# Patient Record
Sex: MALE | Race: WHITE | Employment: OTHER | URBAN - METROPOLITAN AREA
[De-identification: names, ages, dates, MRNs, and addresses within clinical notes are randomized per-mention and may not be internally consistent; named-entity substitution may affect disease eponyms.]

---

## 2017-07-17 ENCOUNTER — OFFICE VISIT (OUTPATIENT)
Dept: UROLOGY | Age: 82
End: 2017-07-17

## 2017-07-17 VITALS
BODY MASS INDEX: 28.29 KG/M2 | OXYGEN SATURATION: 96 % | TEMPERATURE: 97.1 F | HEART RATE: 65 BPM | DIASTOLIC BLOOD PRESSURE: 69 MMHG | HEIGHT: 69 IN | WEIGHT: 191 LBS | SYSTOLIC BLOOD PRESSURE: 134 MMHG

## 2017-07-17 DIAGNOSIS — C61 PROSTATE CANCER (HCC): Primary | ICD-10-CM

## 2017-07-17 LAB
BILIRUB UR QL STRIP: NEGATIVE
GLUCOSE UR-MCNC: NEGATIVE MG/DL
KETONES P FAST UR STRIP-MCNC: NEGATIVE MG/DL
PH UR STRIP: 7 [PH] (ref 4.6–8)
PROT UR QL STRIP: NEGATIVE MG/DL
SP GR UR STRIP: 1.01 (ref 1–1.03)
UA UROBILINOGEN AMB POC: NORMAL (ref 0.2–1)
URINALYSIS CLARITY POC: CLEAR
URINALYSIS COLOR POC: YELLOW
URINE BLOOD POC: NORMAL
URINE LEUKOCYTES POC: NEGATIVE
URINE NITRITES POC: NEGATIVE

## 2017-07-17 RX ORDER — BUDESONIDE AND FORMOTEROL FUMARATE DIHYDRATE 80; 4.5 UG/1; UG/1
AEROSOL RESPIRATORY (INHALATION)
COMMUNITY
Start: 2017-05-30 | End: 2019-08-23 | Stop reason: SDUPTHER

## 2017-07-17 NOTE — MR AVS SNAPSHOT
Visit Information Date & Time Provider Department Dept. Phone Encounter #  
 7/17/2017  9:15 AM Tammie Hinton, [address] [facility] [phone] Upcoming Health Maintenance Date Due DTaP/Tdap/Td series (1 - Tdap) 10/8/1951 ZOSTER VACCINE AGE 60> 10/8/1990 GLAUCOMA SCREENING Q2Y 10/8/1995 Pneumococcal 65+ High/Highest Risk (1 of 2 - PCV13) 10/8/1995 MEDICARE YEARLY EXAM 10/8/1995 INFLUENZA AGE 9 TO ADULT 8/1/2017 Allergies as of 7/17/2017  Review Complete On: 7/17/2017 By: Matt Salas LPN No Known Allergies Current Immunizations  Never Reviewed No immunizations on file. Not reviewed this visit You Were Diagnosed With   
  
 Codes Comments Prostate cancer [facility])    -  Primary ICD-10-CM: C26 ICD-9-CM: 789 Vitals BP Pulse Temp Height(growth percentile) Weight(growth percentile) SpO2  
 134/69 (BP 1 Location: Left arm, BP Patient Position: Sitting) 65 97.1 °F (36.2 °C) 5' 9\" (1.753 m) 191 lb (86.6 kg) 96% BMI Smoking Status 28.21 kg/m2 Former Smoker Vitals History BMI and BSA Data Body Mass Index Body Surface Area  
 28.21 kg/m 2 2.05 m 2 Preferred Pharmacy Pharmacy Name Phone [facility], [address] [place] [address] Your Updated Medication List  
  
   
This list is accurate as of: 7/17/17  9:32 AM.  Always use your most recent med list.  
  
  
  
  
 ALEVE 220 mg Cap Generic drug:  naproxen sodium Take  by mouth. HYDROcodone-acetaminophen 5-325 mg per tablet Commonly known as:  Ruffus Homme Take 1 Tab by mouth every four (4) hours as needed for Pain. Max Daily Amount: 6 Tabs. lisinopril-hydroCHLOROthiazide 10-12.5 mg per tablet Commonly known as:  Mike Sinning Take 1 Tab by mouth daily. methocarbamol 500 mg tablet Commonly known as:  ROBAXIN Take 1 Tab by mouth four (4) times daily. SYMBICORT 80-4.5 mcg/actuation Hfaa inhaler Generic drug:  budesonide-formoterol ZyrTEC 10 mg Cap Generic drug:  Cetirizine Take  by mouth. We Performed the Following AMB POC URINALYSIS DIP STICK AUTO W/O MICRO [54099 CPT(R)] [place] COLLECTION VENOUS BLOOD,VENIPUNCTURE [id number] CPT(R)] PROSTATE SPECIFIC AG (PSA) [id number] CPT(R)] Patient Instructions Prostate Cancer: Care Instructions Your Care Instructions The prostate gland is a small, walnut-shaped organ that lies just below a man's bladder. It surrounds the urethra, the tube that carries urine from the bladder out of the body through the penis. Prostate cancer is the abnormal growth of cells in the prostate gland. Prostate cancer cells can spread within the prostate, to nearby lymph nodes and other tissues, and to other parts of the body. When the cancer hasn't spread outside the prostate, it is called localized prostate cancer. With localized prostate cancer, your options depend on how likely it is that your cancer will grow. Tests will show if your cancer is likely to grow. · Low-risk cancer isn't likely to grow right away. If your cancer is low-risk, you can choose active surveillance. This means your cancer will be watched closely by your doctor with regular checkups and tests to see if the cancer grows. This choice allows you to delay having surgery or radiation, often for many years. If the cancer grows very slowly, you may never need treatment. · Medium-risk cancer is more likely to grow. Some men with this type of cancer may be able to choose active surveillance. Others may need to choose surgery or radiation. · High-risk cancer is most likely to grow. If you have high-risk cancer, you will likely need to choose surgery or radiation. If your cancer has already spread outside the prostate or to other parts of the body, then you may have other treatments, like chemotherapy or hormone therapy. If you are over age [de-identified] or have other serious health problems, like heart disease, you may choose not to have treatments to cure your cancer. Instead, you can just have treatments to manage your symptoms. This is called watchful waiting. Finding out that you have cancer is scary. You may feel many emotions and may need some help coping. Seek out family, friends, and counselors for support. You also can do things at home to make yourself feel better while you go through treatment. Call the [employer] (3-477.244.7496) or visit its website at 6[address] [employer]. Gini [employer] Jony for more information. Follow-up care is a key part of your treatment and safety. Be sure to make and go to all appointments, and call your doctor if you are having problems. It's also a good idea to know your test results and keep a list of the medicines you take. How can you care for yourself at home? · Your doctor will talk to you about your treatment options. You may need to learn more about each of them before you can decide which treatment is best for you. · Take your medicines exactly as prescribed. Call your doctor if you think you are having a problem with your medicine. You will get more details on the specific medicines your doctor prescribes. · Eat healthy food. If you do not feel like eating, try to eat food that has protein and extra calories to keep up your strength and prevent weight loss. Drink liquid meal replacements for extra calories and protein. Try to eat your main meal early. · Take steps to control your stress and workload. Learn relaxation techniques. ¨ Share your feelings. Stress and tension affect our emotions. By expressing your feelings to others, you may be able to understand and cope with them. ¨ Consider joining a support group. Talking about a problem with your spouse, a good friend, or other people with similar problems is a good way to reduce tension and stress. ¨ Express yourself through art. Try writing, crafts, dance, or art to relieve stress. Some dance, writing, or art groups may be available just for people who have cancer. ¨ Be kind to your body and mind. Getting enough sleep, eating a healthy diet, and taking time to do things you enjoy can contribute to an overall feeling of balance in your life and can help reduce stress. ¨ Get help if you need it. Discuss your concerns with your doctor or counselor. · Get some physical activity every day, but do not get too tired. Keep doing the hobbies you enjoy as your energy allows. · If you are vomiting or have diarrhea: ¨ Drink plenty of fluids (enough so that your urine is light yellow or clear like water) to prevent dehydration. Choose water and other caffeine-free clear liquids. If you have kidney, heart, or liver disease and have to limit fluids, talk with your doctor before you increase the amount of fluids you drink. ¨ When you are able to eat, try clear soups, mild foods, and liquids until all symptoms are gone for 12 to 48 hours. Jell-O, dry toast, crackers, and cooked cereal are also good choices. · If you have not already done so, prepare a list of advance directives. Advance directives are instructions to your doctor and family members about what kind of care you want if you become unable to speak or express yourself. When should you call for help? Call your doctor now or seek immediate medical care if: 
· You cannot urinate. · You have symptoms of a urinary infection. For example: ¨ You have blood or pus in your urine. ¨ You have pain in your back just below your rib cage. This is called flank pain. ¨ You have a fever, chills, or body aches. ¨ It hurts to urinate. ¨ You have groin or belly pain. · You have pain in your back or hips. · Your pain is not controlled. · You are vomiting or nauseated. Watch closely for changes in your health, and be sure to contact your doctor if: · You have pain when you ejaculate. · You have trouble starting or controlling your urine. Where can you learn more? Go to http://danny-luciano.info/. Enter V141 in the search box to learn more about \"Prostate Cancer: Care Instructions. \" Current as of: July 26, 2016 Content Version: 11.3 © 9951-7578 [employer]. Care instructions adapted under license by [employer] (which disclaims liability or warranty for this information). If you have questions about a medical condition or this instruction, always ask your healthcare professional. [address] any warranty or liability for your use of this information. Introducing [facility]! [employer] introduces [employer] patient portal. Now you can access parts of your medical record, email your doctor's office, and request medication refills online. 1. In your internet browser, go to [email or web address] 2. Click on the First Time User? Click Here link in the Sign In box. You will see the New Member Sign Up page. 3. Enter your [employer] Access Code exactly as it appears below. You will not need to use this code after youve completed the sign-up process. If you do not sign up before the expiration date, you must request a new code. · [employer] Access Code: [email or web address] Expires: 10/15/2017  9:04 AM 
 
4. Enter the last four digits of your Social Security Number (xxxx) and Date of Birth (mm/dd/yyyy) as indicated and click Submit. You will be taken to the next sign-up page. 5. Create a Visual Pro 360t ID. This will be your [employer] login ID and cannot be changed, so think of one that is secure and easy to remember. 6. Create a [employer] password. You can change your password at any time. 7. Enter your Password Reset Question and Answer. This can be used at a later time if you forget your password. 8. Enter your e-mail address.  You will receive e-mail notification when new information is available in [employer]. 9. Click Sign Up. You can now view and download portions of your medical record. 10. Click the Download Summary menu link to download a portable copy of your medical information. If you have questions, please visit the Frequently Asked Questions section of the [employer] website. Remember, [employer] is NOT to be used for urgent needs. For medical emergencies, dial 911. Now available from your iPhone and Android! Please provide this summary of care documentation to your next provider. Your primary care clinician is listed as ADRIANA SUE. If you have any questions after today's visit, please call [phone].

## 2017-07-17 NOTE — PROGRESS NOTES
Mr. Aragon [facility] has a reminder for a \"due or due soon\" health maintenance. I have asked that he contact his primary care provider for follow-up on this health maintenance. RBV per Dr. Norm Atwood blood drawn in office today for PSA for Prostate Ca.

## 2017-07-17 NOTE — PROGRESS NOTES
Elsy Henry 80 y.o. male     Mr. Yosef Fraser seen today for follow-up prostate carcinoma status post radical retropubic prostatectomy in 1997 by Dr. Veronica Lopez  Patient is voiding well with mild stress urinary incontinence managed by wearing adult diapers patient has no irritative or obstructive voiding symptoms-no complaints regarding urination at this time    PSA less than 0.1 in July 2015  PSA less than 0.1 in July 2016      Review of Systems:    CNS: No seizures syncope headaches or dizziness no visual changes  Respiratory: No shortness of breast or wheezing  Cardiovascular:No chest pain or palpitations  Intestinal:No constipation or dyspepsia  Urinary: No irritative or obstructive voiding symptoms  Skeletal: No bone or joint pain  Endocrine:No diabetes or thyroid disease  Other:    Allergies: No Known Allergies   Medications:    Current Outpatient Prescriptions   Medication Sig Dispense Refill    SYMBICORT 80-4.5 mcg/actuation HFAA inhaler       Cetirizine (ZYRTEC) 10 mg cap Take  by mouth.  lisinopril-hydrochlorothiazide (PRINZIDE, ZESTORETIC) 10-12.5 mg per tablet Take 1 Tab by mouth daily.  naproxen sodium (ALEVE) 220 mg cap Take  by mouth.  HYDROcodone-acetaminophen (NORCO) 5-325 mg per tablet Take 1 Tab by mouth every four (4) hours as needed for Pain. Max Daily Amount: 6 Tabs. 20 Tab 0    methocarbamol (ROBAXIN) 500 mg tablet Take 1 Tab by mouth four (4) times daily. 21 Tab 0       Past Medical History:   Diagnosis Date    Hypertension       Past Surgical History:   Procedure Laterality Date    HX CATARACT REMOVAL Left 09/2004    HX CATARACT REMOVAL Right 11/2004    HX HERNIA REPAIR  11/2001    HX PROSTATECTOMY      HX RETINAL DETACHMENT REPAIR  10/2003     No family history on file.      Physical Examination: Well-nourished mature male in no apparent distress    digital rectal examination-negative for induration masses or tenderness      Urinalysis: Negative dipstick/nitrite negative    Impression: Prostate carcinoma-LUIS ALFREDO 20 years status post radical retropubic prostatectomy    Plan: PSA today    rtc 1 yr PSA SEVERO      More than 1/2 of this 15 minute visit was spent in counselling and coordination of care, as described above. Helga Gardiner MD  -electronically signed-    PLEASE NOTE:  This document has been produced using voice recognition software. Unrecognized errors in transcription may be present.

## 2017-07-17 NOTE — PATIENT INSTRUCTIONS
Prostate Cancer: Care Instructions  Your Care Instructions    The prostate gland is a small, walnut-shaped organ that lies just below a man's bladder. It surrounds the urethra, the tube that carries urine from the bladder out of the body through the penis. Prostate cancer is the abnormal growth of cells in the prostate gland. Prostate cancer cells can spread within the prostate, to nearby lymph nodes and other tissues, and to other parts of the body. When the cancer hasn't spread outside the prostate, it is called localized prostate cancer. With localized prostate cancer, your options depend on how likely it is that your cancer will grow. Tests will show if your cancer is likely to grow. · Low-risk cancer isn't likely to grow right away. If your cancer is low-risk, you can choose active surveillance. This means your cancer will be watched closely by your doctor with regular checkups and tests to see if the cancer grows. This choice allows you to delay having surgery or radiation, often for many years. If the cancer grows very slowly, you may never need treatment. · Medium-risk cancer is more likely to grow. Some men with this type of cancer may be able to choose active surveillance. Others may need to choose surgery or radiation. · High-risk cancer is most likely to grow. If you have high-risk cancer, you will likely need to choose surgery or radiation. If your cancer has already spread outside the prostate or to other parts of the body, then you may have other treatments, like chemotherapy or hormone therapy. If you are over age [de-identified] or have other serious health problems, like heart disease, you may choose not to have treatments to cure your cancer. Instead, you can just have treatments to manage your symptoms. This is called watchful waiting. Finding out that you have cancer is scary. You may feel many emotions and may need some help coping. Seek out family, friends, and counselors for support.  You also can do things at home to make yourself feel better while you go through treatment. Call the [employer] (3-469.109.9421) or visit its website at 6[address] [employer]. iConText for more information. Follow-up care is a key part of your treatment and safety. Be sure to make and go to all appointments, and call your doctor if you are having problems. It's also a good idea to know your test results and keep a list of the medicines you take. How can you care for yourself at home? · Your doctor will talk to you about your treatment options. You may need to learn more about each of them before you can decide which treatment is best for you. · Take your medicines exactly as prescribed. Call your doctor if you think you are having a problem with your medicine. You will get more details on the specific medicines your doctor prescribes. · Eat healthy food. If you do not feel like eating, try to eat food that has protein and extra calories to keep up your strength and prevent weight loss. Drink liquid meal replacements for extra calories and protein. Try to eat your main meal early. · Take steps to control your stress and workload. Learn relaxation techniques. ¨ Share your feelings. Stress and tension affect our emotions. By expressing your feelings to others, you may be able to understand and cope with them. ¨ Consider joining a support group. Talking about a problem with your spouse, a good friend, or other people with similar problems is a good way to reduce tension and stress. ¨ Express yourself through art. Try writing, crafts, dance, or art to relieve stress. Some dance, writing, or art groups may be available just for people who have cancer. ¨ Be kind to your body and mind. Getting enough sleep, eating a healthy diet, and taking time to do things you enjoy can contribute to an overall feeling of balance in your life and can help reduce stress. ¨ Get help if you need it.  Discuss your concerns with your doctor or counselor. · Get some physical activity every day, but do not get too tired. Keep doing the hobbies you enjoy as your energy allows. · If you are vomiting or have diarrhea:  ¨ Drink plenty of fluids (enough so that your urine is light yellow or clear like water) to prevent dehydration. Choose water and other caffeine-free clear liquids. If you have kidney, heart, or liver disease and have to limit fluids, talk with your doctor before you increase the amount of fluids you drink. ¨ When you are able to eat, try clear soups, mild foods, and liquids until all symptoms are gone for 12 to 48 hours. Jell-O, dry toast, crackers, and cooked cereal are also good choices. · If you have not already done so, prepare a list of advance directives. Advance directives are instructions to your doctor and family members about what kind of care you want if you become unable to speak or express yourself. When should you call for help? Call your doctor now or seek immediate medical care if:  · You cannot urinate. · You have symptoms of a urinary infection. For example:  ¨ You have blood or pus in your urine. ¨ You have pain in your back just below your rib cage. This is called flank pain. ¨ You have a fever, chills, or body aches. ¨ It hurts to urinate. ¨ You have groin or belly pain. · You have pain in your back or hips. · Your pain is not controlled. · You are vomiting or nauseated. Watch closely for changes in your health, and be sure to contact your doctor if:  · You have pain when you ejaculate. · You have trouble starting or controlling your urine. Where can you learn more? Go to http://danny-luciano.info/. Enter V141 in the search box to learn more about \"Prostate Cancer: Care Instructions. \"  Current as of: July 26, 2016  Content Version: 11.3  © 9663-6717 [employer].  Care instructions adapted under license by [employer] (which disclaims liability or warranty for this information). If you have questions about a medical condition or this instruction, always ask your healthcare professional. [place] [address] any warranty or liability for your use of this information.

## 2017-07-18 LAB — PSA SERPL-MCNC: <0.1 NG/ML (ref 0–4)

## 2017-12-22 ENCOUNTER — OFFICE VISIT (OUTPATIENT)
Dept: ORTHOPEDIC SURGERY | Age: 82
End: 2017-12-22

## 2017-12-22 VITALS
SYSTOLIC BLOOD PRESSURE: 150 MMHG | DIASTOLIC BLOOD PRESSURE: 71 MMHG | HEIGHT: 69 IN | WEIGHT: 190.8 LBS | OXYGEN SATURATION: 98 % | BODY MASS INDEX: 28.26 KG/M2 | RESPIRATION RATE: 16 BRPM | HEART RATE: 70 BPM

## 2017-12-22 DIAGNOSIS — M54.41 LOW BACK PAIN WITH RIGHT-SIDED SCIATICA, UNSPECIFIED BACK PAIN LATERALITY, UNSPECIFIED CHRONICITY: Primary | ICD-10-CM

## 2017-12-22 DIAGNOSIS — M25.551 RIGHT HIP PAIN: ICD-10-CM

## 2017-12-22 DIAGNOSIS — M70.61 TROCHANTERIC BURSITIS OF RIGHT HIP: ICD-10-CM

## 2017-12-22 RX ORDER — BETAMETHASONE SODIUM PHOSPHATE AND BETAMETHASONE ACETATE 3; 3 MG/ML; MG/ML
6 INJECTION, SUSPENSION INTRA-ARTICULAR; INTRALESIONAL; INTRAMUSCULAR; SOFT TISSUE ONCE
Qty: 1 ML | Refills: 0
Start: 2017-12-22 | End: 2017-12-22

## 2017-12-22 RX ORDER — ALBUTEROL SULFATE 90 UG/1
2 AEROSOL, METERED RESPIRATORY (INHALATION) DAILY
COMMUNITY
End: 2022-08-08

## 2017-12-22 NOTE — PROGRESS NOTES
[address] [place] [address]  [phone]           Patient: Cecelia Thomas                MRN: [id number]       SSN: [id number]  YOB: 1930        AGE: 80 y.o. SEX: male  Body mass index is 28.18 kg/(m^2). PCP: Jenny Milligan MD  12/22/17      This office note has been dictated. REVIEW OF SYSTEMS:  Constitutional: Negative for fever, chills, weight loss and malaise/fatigue. HENT: Negative. Eyes: Negative. Respiratory: Negative. Cardiovascular: Negative. Gastrointestinal: No bowel incontinence or constipation. Genitourinary: No bladder incontinence or saddle anesthesia. Skin: Negative. Neurological: Negative. Endo/Heme/Allergies: Negative. Psychiatric/Behavioral: Negative. Musculoskeletal: As per HPI above. Past Medical History:   Diagnosis Date    Hypertension          Current Outpatient Prescriptions:     albuterol (PROAIR HFA) 90 mcg/actuation inhaler, Take 2 Puffs by inhalation daily. , Disp: , Rfl:     Cetirizine (ZYRTEC) 10 mg cap, Take  by mouth., Disp: , Rfl:     naproxen sodium (ALEVE) 220 mg cap, Take  by mouth., Disp: , Rfl:     lisinopril-hydrochlorothiazide (PRINZIDE, ZESTORETIC) 10-12.5 mg per tablet, Take 1 Tab by mouth daily. , Disp: , Rfl:     SYMBICORT 80-4.5 mcg/actuation HFAA inhaler, , Disp: , Rfl:     HYDROcodone-acetaminophen (NORCO) 5-325 mg per tablet, Take 1 Tab by mouth every four (4) hours as needed for Pain. Max Daily Amount: 6 Tabs. (Patient not taking: Reported on 12/22/2017), Disp: 20 Tab, Rfl: 0    methocarbamol (ROBAXIN) 500 mg tablet, Take 1 Tab by mouth four (4) times daily. (Patient not taking: Reported on 12/22/2017), Disp: 20 Tab, Rfl: 0    No Known Allergies    Social History     Social History    Marital status:      Spouse name: N/A    Number of children: N/A    Years of education: N/A     Occupational History    Not on file. bursae on the right side. There is no pain with rotation of either hip. He does have a little bit of stiffness however. There is negative straight leg raise. There is negative calf tenderness. There is negative Zenias. There is no evidence of DVT present. 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 There is evidence for osteopenia. AP and lateral of the lumbar spine shows multilevel degenerative changes with evidence for a previous L3 compression fracture. ASSESSMENT:      1. Lumbar stenosis. 2. Trochanteric bursitis right hip. PLAN:  At this point, we are going to move forward with an MRI of the lumbar spine for further evaluation. I think that most of his problem is coming from his back. However, we are going to move forward with a cortisone injection for the right hip today. Under aseptic conditions, after informed and written consent, and appropriate time out performed, with ultrasound-guided assistance, the right hip was prepped with Betadine and 6 mg of Celestone was injected without complications. The patient tolerated the injection well. The patient was instructed on post injection care. We will see him back in the office after the MRI is performed for further evaluation.                     JR Shaun GONZALEZ, TY, ATC

## 2018-01-08 ENCOUNTER — HOSPITAL ENCOUNTER (OUTPATIENT)
Age: 83
Discharge: HOME | End: 2018-01-08
Attending: PHYSICIAN ASSISTANT
Payer: MEDICARE

## 2018-01-08 DIAGNOSIS — M54.41 LOW BACK PAIN WITH RIGHT-SIDED SCIATICA, UNSPECIFIED BACK PAIN LATERALITY, UNSPECIFIED CHRONICITY: ICD-10-CM

## 2018-01-08 PROCEDURE — 72148 MRI LUMBAR SPINE W/O DYE: CPT

## 2018-01-11 ENCOUNTER — OFFICE VISIT (OUTPATIENT)
Dept: ORTHOPEDIC SURGERY | Age: 83
End: 2018-01-11

## 2018-01-11 VITALS
SYSTOLIC BLOOD PRESSURE: 132 MMHG | HEART RATE: 68 BPM | OXYGEN SATURATION: 95 % | WEIGHT: 190 LBS | HEIGHT: 69 IN | BODY MASS INDEX: 28.14 KG/M2 | DIASTOLIC BLOOD PRESSURE: 76 MMHG | TEMPERATURE: 97.8 F

## 2018-01-11 DIAGNOSIS — M51.36 DEGENERATION OF INTERVERTEBRAL DISC OF LUMBAR REGION: Primary | ICD-10-CM

## 2018-01-11 NOTE — PROGRESS NOTES
[address] [place] [address]  [phone]           Patient: Krishan Lawson                MRN: [id number]       SSN: [id number]  YOB: 1930        AGE: 80 y.o. SEX: male  Body mass index is 28.06 kg/(m^2). PCP: Escobar Wang MD  01/11/18      This office note has been dictated. REVIEW OF SYSTEMS:  Constitutional: Negative for fever, chills, weight loss and malaise/fatigue. HENT: Negative. Eyes: Negative. Respiratory: Negative. Cardiovascular: Negative. Gastrointestinal: No bowel incontinence or constipation. Genitourinary: No bladder incontinence or saddle anesthesia. Skin: Negative. Neurological: Negative. Endo/Heme/Allergies: Negative. Psychiatric/Behavioral: Negative. Musculoskeletal: As per HPI above. Past Medical History:   Diagnosis Date    Hypertension          Current Outpatient Prescriptions:     albuterol (PROAIR HFA) 90 mcg/actuation inhaler, Take 2 Puffs by inhalation daily. , Disp: , Rfl:     SYMBICORT 80-4.5 mcg/actuation HFAA inhaler, , Disp: , Rfl:     Cetirizine (ZYRTEC) 10 mg cap, Take  by mouth., Disp: , Rfl:     naproxen sodium (ALEVE) 220 mg cap, Take  by mouth., Disp: , Rfl:     lisinopril-hydrochlorothiazide (PRINZIDE, ZESTORETIC) 10-12.5 mg per tablet, Take 1 Tab by mouth daily. , Disp: , Rfl:     HYDROcodone-acetaminophen (NORCO) 5-325 mg per tablet, Take 1 Tab by mouth every four (4) hours as needed for Pain. Max Daily Amount: 6 Tabs., Disp: 20 Tab, Rfl: 0    methocarbamol (ROBAXIN) 500 mg tablet, Take 1 Tab by mouth four (4) times daily. , Disp: 20 Tab, Rfl: 0    No Known Allergies    Social History     Social History    Marital status:      Spouse name: N/A    Number of children: N/A    Years of education: N/A     Occupational History    Not on file.      Social History Main Topics    Smoking status: Former Smoker     Quit date: 1/1/1978    Smokeless tobacco: Never Used    Alcohol use 1.0 oz/week     2 Standard drinks or equivalent per week    Drug use: No    Sexual activity: Not on file     Other Topics Concern    Not on file     Social History Narrative       Past Surgical History:   Procedure Laterality Date    HX CATARACT REMOVAL Left 09/2004    HX CATARACT REMOVAL Right 11/2004    HX HERNIA REPAIR  11/2001    HX PROSTATECTOMY      HX RETINAL DETACHMENT REPAIR  10/2003             We did see Mr. Gaby Awad for followup with regards to his low back and right lower extremity. The patient is having right leg pain. He was sent for an MRI of his back for further evaluation. He returns today for review. He has had no change in his bowel or bladder habits. He has had no fevers, chills, systemic changes, or injuries to report. PHYSICAL EXAMINATION:  In general, the patient is alert and oriented x 3 in no acute distress. The patient is well-developed, well-nourished, with a normal affect. The patient is afebrile. Examination of the back reveals the skin is intact. There is no ecchymosis, no warmth, and no signs for infection or cellulitis present. There is no pain to palpation of the midline lumbar spine, as well as paraspinally. There is no pain with rotation of either hip. There is no pain with palpation of the trochanteric bursae. There is negative straight leg raise. There is negative calf tenderness. There is negative Zenias. There is no evidence of DVT present. RADIOGRAPHS:  Review of the MRI of the lumbar spine shows multilevel degenerative changes with subacute or chronic compression fracture of L3 with L2-3 moderate to severe central stenosis and foraminal stenosis. Significant right foraminal stenosis at L5-S1.      ASSESSMENT:  Lumbar radiculopathy. PLAN:  At this point, we will get him over to [facility] [address] [place] [address] for further evaluation and treatment.   I think he would benefit from a possible epidural steroid injection or possible physical therapy. We will see him back in the office on an as-needed basis.                  JR Shaun GONZALEZ, PAYaniC, ATC

## 2018-02-05 ENCOUNTER — OFFICE VISIT (OUTPATIENT)
Dept: ORTHOPEDIC SURGERY | Age: 83
End: 2018-02-05

## 2018-02-05 VITALS
WEIGHT: 191 LBS | OXYGEN SATURATION: 96 % | BODY MASS INDEX: 28.29 KG/M2 | HEART RATE: 67 BPM | RESPIRATION RATE: 16 BRPM | SYSTOLIC BLOOD PRESSURE: 143 MMHG | HEIGHT: 69 IN | TEMPERATURE: 98 F | DIASTOLIC BLOOD PRESSURE: 71 MMHG

## 2018-02-05 DIAGNOSIS — M48.062 SPINAL STENOSIS OF LUMBAR REGION WITH NEUROGENIC CLAUDICATION: Primary | ICD-10-CM

## 2018-02-05 DIAGNOSIS — S32.030A CLOSED COMPRESSION FRACTURE OF THIRD LUMBAR VERTEBRA, INITIAL ENCOUNTER: ICD-10-CM

## 2018-02-05 RX ORDER — GABAPENTIN 100 MG/1
CAPSULE ORAL
Qty: 45 CAP | Refills: 0 | Status: SHIPPED | OUTPATIENT
Start: 2018-02-05 | End: 2022-08-03

## 2018-02-05 NOTE — PROGRESS NOTES
[address]  [address], [address]  [place]  Phone: [phone]  Fax: [phone]        Moon Eason  : 10/8/1930  PCP: Gilson Esquivel MD      NEW PATIENT      ASSESSMENT AND PLAN     Diagnoses and all orders for this visit:    1. 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 Closed compression fracture of third lumbar vertebra, initial encounter [facility])  Comments:  Chronic aysmptomatic, noted on MRI 2018    1. Clinically doing well, stays active. No specific tx at this time. 2. Urgent symptoms discussed with pt and daughter. 3. Rx for PRN Gabapentin 100mg, cautioned about somnolence/dizziness. 4. Given information on stenosis, medications, PT, injections. Follow-up Disposition:  Return if symptoms worsen or fail to improve. CHIEF COMPLAINT  Jimbo Mitchell is seen today in consultation at the request of Teresita Urena for complaints of R hip and leg pain. HISTORY OF PRESENT ILLNESS  Jimbo Mitchell is a 80 y.o. male. Today pt c/o R hip and leg pain of 3 months duration. Pt denies any specific incident or injury that caused their pain. He states that he has been seen at Dr. Dave Maria office but they no longer take his insurance. He went to [facility] when he began to have pain in his R hip into his leg and was told that his pain was coming from his back. Had a hip bursa  w/JR Pineda about a month ago w/benefit. He denies any pain in his back. His pain starts in his R buttock and into his hip and leg, intermittent. His daughter states that he remains active and is doing repetitive bending, lifting, twisting. He states that he always does this which does not bother him. He reports he was doing well 6 months ago and was able to participate in activities and work he wished to without pain.  He denies limping due to pain. Pt reports good and bad days with his pain. Pt denies weakness in BLE, no difficulty w/walking. Pt denies saddle paresthesias. Pt has tried; PT-No,  Non-opioid medications Yes, and spinal injections No.  Denies persistent fevers, chills, weight changes, neurogenic bowel or bladder symptoms. Pt denies recent ED visits or hospitalizations. PMHx of hernia repair.  reviewed. Pt wife has passed, was  36 years. Pain Assessment  2/5/2018   Location of Pain Leg;Hip   Location Modifiers -   Severity of Pain 3   Quality of Pain Aching   Duration of Pain -   Frequency of Pain Intermittent   Aggravating Factors Walking   Limiting Behavior -   Relieving Factors (No Data)   Relieving Factors Comment sitting, standing, lying, changing positions, cough/sneeze, lifting, bending   Result of Injury -           MRI Results (most recent):    Results from Hospital Encounter encounter on 01/08/18   MRI LUMB SPINE WO CONT   Narrative EXAM:  MRI LUMB SPINE WO CONT    INDICATION:   degenerative disc disease    COMPARISON: None    TECHNIQUE:   MR imaging of the lumbar spine was performed with sagittal T1, T2, STIR;  axial  T1, T2. Contrast was not administered. FINDINGS:  There may be mild scoliosis convex to the left. Grade 1 retrolisthesis of L2 on  L3, slight retrolisthesis of L4 on L5. Slight 1 anterior listhesis of L5 on S1. Moderate loss of height of L3, approximately 30 percent with more severe central  endplate depression/Schmorl's node formation. There is severe disc space  narrowing with mild surrounding endplate edema at G6-A0, edema also surrounds  Schmorl's node. Mild loss of height of lumbar vertebral bodies L4 and L5. Marrow signal within the remaining vertebral bodies is within normal limits. Mild degenerative discogenic disease at remaining levels. The conus medullaris  terminates at mid L1. Mild degenerative changes at the sacroiliac joints. T12/L1: Mild facet arthropathy.  No significant central canal or foraminal  stenosis. L1/2:  Mild disc bulge. Mild facet arthropathy. Mild foraminal stenosis. Mild  central canal stenosis. L2/3:  Diffuse disc bulge. Moderate facet and ligamentous hypertrophy. Moderate  to severe foraminal stenosis. Moderate to severe central canal stenosis with  triangular appearance of the thecal sac, midline AP diameter is 5 mm. L3/4:  Diffuse disc bulge. Moderate facet and ligamentous hypertrophy. Moderate  central canal stenosis, midline AP diameter is 6 mm. Narrowing of the lateral  recesses. Moderate narrowing of the neural foramen. L4/5:  Diffuse disc bulge. Moderate facet and ligamentous hypertrophy. Moderate  narrowing of the central canal, midline AP diameter is 7 to 8 mm. Moderate  foraminal stenosis. L5/S1:  Diffuse disc bulge. Mild facet and ligamentous hypertrophy. Severe right  and moderate left foraminal stenosis. Mild narrowing of the central canal.    0.4 cm T2 hyperintensity in the left kidney, may represent small renal cyst.           Impression IMPRESSION:      Moderate loss of height of L3 with associated superior central endplate  depression/Schmorl's node formation. There is some endplate edema surrounding  the Schmorl's node and within the superior L3. Could suggest subacute to chronic  compression fracture. Advanced multilevel degenerative changes most prominent at L2-L3 where there is  moderate to severe central canal and foraminal stenosis. Significant right foraminal stenosis at L5-S1.       Thank you for this referral.               PAST MEDICAL HISTORY   Past Medical History:   Diagnosis Date    Closed compression fracture of L3 lumbar vertebra ([address] 2/5/2018    Chronic aysmptomatic, noted on MRI 2018    Hypertension        Past Surgical History:   Procedure Laterality Date    HX CATARACT REMOVAL Left 09/2004    HX CATARACT REMOVAL Right 11/2004    HX HERNIA REPAIR  11/2001    HX PROSTATECTOMY      HX RETINAL DETACHMENT REPAIR  10/2003       MEDICATIONS      Current Outpatient Prescriptions   Medication Sig Dispense Refill    gabapentin (NEURONTIN) 100 mg capsule Take 1 tab PO BID PRN for nerve pain 45 Cap 0    albuterol (PROAIR HFA) 90 mcg/actuation inhaler Take 2 Puffs by inhalation daily.  SYMBICORT 80-4.5 mcg/actuation HFAA inhaler       Cetirizine (ZYRTEC) 10 mg cap Take  by mouth.  naproxen sodium (ALEVE) 220 mg cap Take  by mouth.  lisinopril-hydrochlorothiazide (PRINZIDE, ZESTORETIC) 10-12.5 mg per tablet Take 1 Tab by mouth daily.  HYDROcodone-acetaminophen (NORCO) 5-325 mg per tablet Take 1 Tab by mouth every four (4) hours as needed for Pain. Max Daily Amount: 6 Tabs. 20 Tab 0    methocarbamol (ROBAXIN) 500 mg tablet Take 1 Tab by mouth four (4) times daily. 20 Tab 0       ALLERGIES  No Known Allergies       SOCIAL HISTORY    Social History     Social History    Marital status:      Spouse name: N/A    Number of children: N/A    Years of education: N/A     Occupational History    Not on file. Social History Main Topics    Smoking status: Former Smoker     Quit date: 1/1/1978    Smokeless tobacco: Never Used    Alcohol use 1.0 oz/week     2 Standard drinks or equivalent per week    Drug use: No    Sexual activity: Not on file     Other Topics Concern    Not on file     Social History Narrative       FAMILY HISTORY  History reviewed. No pertinent family history. REVIEW OF SYSTEMS  Review of Systems   Constitutional: Negative for chills, fever and weight loss. Respiratory: Negative for shortness of breath. Cardiovascular: Negative for chest pain. Gastrointestinal: Negative for constipation. Negative for fecal incontinence   Genitourinary: Negative for dysuria. Negative for urinary incontinence   Musculoskeletal:        Per HPI   Skin: Negative for rash. Neurological: Negative for dizziness, tingling, tremors, focal weakness and headaches. Endo/Heme/Allergies: Does not bruise/bleed easily. Psychiatric/Behavioral: The patient does not have insomnia. PHYSICAL EXAMINATION  Visit Vitals    /71    Pulse 67    Temp 98 °F (36.7 °C) (Oral)    Resp 16    Ht 5' 9\" (1.753 m)    Wt 191 lb (86.6 kg)    SpO2 96%    BMI 28.21 kg/m2          Accompanied by family member, daughter. Constitutional:  Well developed, well nourished, in no acute distress. Psychiatric: Affect and mood are appropriate. Integumentary: No rashes or abrasions noted on exposed areas. Cardiovascular/Peripheral Vascular: Intact l pulses. No peripheral edema is noted. Lymphatic:  No evidence of lymphedema. No cervical lymphadenopathy. SPINE/MUSCULOSKELETAL EXAM    Lumbar spine:  No rash, ecchymosis, or gross obliquity. No fasciculations. No focal atrophy is noted. No TTP along lumbar spinous processes. No tenderness to palpation at the sciatic notch. SI joints non-tender. Trochanters non tender. Sensation grossly intact to light touch. Mild tenderness R trochanteric bursa. MOTOR:       Hip Flex  Quads Hamstrings Ankle DF EHL Ankle PF   Right +4/5 +4/5 +4/5 +4/5 +4/5 +4/5   Left +4/5 +4/5 +4/5 +4/5 +4/5 +4/5     Straight Leg raise negative. Ambulation without assistive device. FWB.mild limp initially when going from sit to stand    Written by Joao Payan, as dictated by Corky Bonds MD.    I, Dr. Corky Bonds MD, confirm that all documentation is accurate. Mr. Justin Brown may have a reminder for a \"due or due soon\" health maintenance. I have asked that he contact his primary care provider for follow-up on this health maintenance.

## 2018-02-05 NOTE — PROGRESS NOTES
TYREEB ENTERED PER DR. Carmel Archuleta AS DOCUMENTED ON BLUE SHEET: Gabapentin 100 mg Take 1 tab PO BID PRN for nerve pain Disp 45 0 RF

## 2018-02-05 NOTE — PATIENT INSTRUCTIONS
Lumbar Spinal Stenosis: Care Instructions  Your Care Instructions    Stenosis in the spine is a narrowing of the canal that is around the spinal cord and nerve roots in your back. It can happen as part of aging. Sometimes bone and other tissue grow into this canal and press on the nerves that branch out from the spinal cord. This can cause pain, numbness, and weakness. When it happens in the lower part of your back, it is called lumbar spinal stenosis. It can cause problems in the legs, feet, and rear end (buttocks). You may be able to get relief from the symptoms of spinal stenosis by taking pain medicine. Your doctor may suggest physical therapy and exercises to keep your spine strong and flexible. Some people try steroid shots to reduce swelling. If pain and numbness in your legs are still so bad that you cannot do your normal activities, you may need surgery. Follow-up care is a key part of your treatment and safety. Be sure to make and go to all appointments, and call your doctor if you are having problems. It's also a good idea to know your test results and keep a list of the medicines you take. How can you care for yourself at home? · Take an over-the-counter pain medicine, such as acetaminophen (Tylenol), ibuprofen (Advil, Motrin), or naproxen (Aleve). Be safe with medicines. Read and follow all instructions on the label. · Do not take two or more pain medicines at the same time unless the doctor told you to. Many pain medicines have acetaminophen, which is Tylenol. Too much acetaminophen (Tylenol) can be harmful. · Stay at a healthy weight. Being overweight puts extra strain on your spine. · Change positions often when you sit or stand. This can ease pain. It may also reduce pressure on the spinal cord and its nerves. · Avoid doing things that make your symptoms worse. Walking downhill and standing for a long time may cause pain.   · Stretch and strengthen your back muscles as your doctor or physical therapist recommends. If your doctor says it is okay to do them, these exercises may help. ¨ Lie on your back with your knees bent. Gently pull one bent knee to your chest. Put that foot back on the floor, and then pull the other knee to your chest.  ¨ Do pelvic tilts. Lie on your back with your knees bent. Tighten your stomach muscles. Pull your belly button (navel) in and up toward your ribs. You should feel like your back is pressing to the floor and your hips and pelvis are slightly lifting off the floor. Hold for 6 seconds while breathing smoothly. ¨ Stand with your back flat against a wall. Slowly slide down until your knees are slightly bent. Hold for 10 seconds, then slide back up the wall. · Remove or change anything in your house that may cause you to fall. Keep walkways clear of clutter, electrical cords, and throw rugs. When should you call for help? Call 911 anytime you think you may need emergency care. For example, call if:  ? · You are unable to move a leg at all. ?Call your doctor now or seek immediate medical care if:  ? · You have new or worse symptoms in your legs, belly, or buttocks. Symptoms may include:  ¨ Numbness or tingling. ¨ Weakness. ¨ Pain. ? · You lose bladder or bowel control. ? Watch closely for changes in your health, and be sure to contact your doctor if you are not getting better as expected. Where can you learn more? Go to http://danny-luciano.info/. Lizeth Herrmann in the search box to learn more about \"Lumbar Spinal Stenosis: Care Instructions. \"  Current as of: March 21, 2017  Content Version: 11.4  © 5595-3198 [employer]. Care instructions adapted under license by [employer] (which disclaims liability or warranty for this information).  If you have questions about a medical condition or this instruction, always ask your healthcare professional. [address]  any warranty or liability for your use of this information.

## 2018-07-17 ENCOUNTER — OFFICE VISIT (OUTPATIENT)
Dept: UROLOGY | Age: 83
End: 2018-07-17

## 2018-07-17 VITALS
HEIGHT: 69 IN | HEART RATE: 65 BPM | SYSTOLIC BLOOD PRESSURE: 128 MMHG | BODY MASS INDEX: 27.4 KG/M2 | WEIGHT: 185 LBS | OXYGEN SATURATION: 95 % | DIASTOLIC BLOOD PRESSURE: 72 MMHG

## 2018-07-17 DIAGNOSIS — C61 PROSTATE CA (HCC): Primary | ICD-10-CM

## 2018-07-17 LAB
BILIRUB UR QL STRIP: NEGATIVE
GLUCOSE UR-MCNC: NEGATIVE MG/DL
KETONES P FAST UR STRIP-MCNC: NEGATIVE MG/DL
PH UR STRIP: 7 [PH] (ref 4.6–8)
PROT UR QL STRIP: NEGATIVE
SP GR UR STRIP: 1.01 (ref 1–1.03)
UA UROBILINOGEN AMB POC: NORMAL (ref 0.2–1)
URINALYSIS CLARITY POC: CLEAR
URINALYSIS COLOR POC: YELLOW
URINE BLOOD POC: NEGATIVE
URINE LEUKOCYTES POC: NEGATIVE
URINE NITRITES POC: NEGATIVE

## 2018-07-17 RX ORDER — BUDESONIDE AND FORMOTEROL FUMARATE DIHYDRATE 80; 4.5 UG/1; UG/1
AEROSOL RESPIRATORY (INHALATION)
COMMUNITY

## 2018-07-17 NOTE — PROGRESS NOTES
Mr. Heather Castellanos has a reminder for a \"due or due soon\" health maintenance. I have asked that he contact his primary care provider for follow-up on this health maintenance. RBV Per Dr. Austin Pratt draw lab today for PSA for Prostate CA.

## 2018-07-17 NOTE — MR AVS SNAPSHOT
[address] [facility] [place] [facility] [address] [address] 
[phone] Patient: Cecelia Thomas MRN: [id number] BUL:[id number] Visit Information Date & Time Provider Department Dept. Phone Encounter #  
 7/17/2018  [address], [address] [place] [address] [facility] [phone] Follow-up Instructions Return in about 1 year (around 7/17/2019) for SEVERO. Follow-up and Disposition History Your Appointments 7/16/2019  9:00 AM  
Office Visit with Vikki Mcginnis MD  
[facility]) Appt Note: PSA  
 [address] [place] [address] [address]  
[phone] [address] 600 [facility] [address] Upcoming Health Maintenance Date Due DTaP/Tdap/Td series (1 - Tdap) 10/8/1951 ZOSTER VACCINE AGE 60> 8/8/1990 GLAUCOMA SCREENING Q2Y 10/8/1995 Pneumococcal 65+ High/Highest Risk (1 of 2 - PCV13) 10/8/1995 MEDICARE YEARLY EXAM 3/14/2018 Influenza Age 5 to Adult 8/1/2018 Allergies as of 7/17/2018  Review Complete On: 7/17/2018 By: Vikki Mcginnis MD  
 No Known Allergies Current Immunizations  Never Reviewed No immunizations on file. Not reviewed this visit You Were Diagnosed With   
  
 Codes Comments Prostate CA [facility])    -  Primary ICD-10-CM: A05 ICD-9-CM: 591 Vitals BP Pulse Height(growth percentile) Weight(growth percentile) SpO2 BMI  
 128/72 (BP 1 Location: Left arm, BP Patient Position: Sitting) 65 5' 9\" (1.753 m) 185 lb (83.9 kg) 95% 27.32 kg/m2 Smoking Status Former Smoker Vitals History BMI and BSA Data Body Mass Index Body Surface Area  
 27.32 kg/m 2 2.02 m 2 Preferred Pharmacy Pharmacy Name Phone [phone], [address] [facility] Your Updated Medication List  
  
   
 This list is accurate as of 7/17/18 10:06 AM.  Always use your most recent med list.  
  
  
  
  
 ALEVE 220 mg Cap Generic drug:  naproxen sodium Take  by mouth.  
  
 gabapentin 100 mg capsule Commonly known as:  NEURONTIN Take 1 tab PO BID PRN for nerve pain HYDROcodone-acetaminophen 5-325 mg per tablet Commonly known as:  Juanetta Rasp Take 1 Tab by mouth every four (4) hours as needed for Pain. Max Daily Amount: 6 Tabs. lisinopril-hydroCHLOROthiazide 10-12.5 mg per tablet Commonly known as:  Alec Decent Take 1 Tab by mouth daily. methocarbamol 500 mg tablet Commonly known as:  ROBAXIN Take 1 Tab by mouth four (4) times daily. PROAIR HFA 90 mcg/actuation inhaler Generic drug:  albuterol Take 2 Puffs by inhalation daily. * budesonide-formoterol 80-4.5 mcg/actuation Hfaa Commonly known as:  SYMBICORT  
INHALE 2 PUFFS INTO MOUTH ONCE DAILY  
  
 * SYMBICORT 80-4.5 mcg/actuation Hfaa Generic drug:  budesonide-formoterol ZyrTEC 10 mg Cap Generic drug:  Cetirizine Take  by mouth. * Notice: This list has 2 medication(s) that are the same as other medications prescribed for you. Read the directions carefully, and ask your doctor or other care provider to review them with you. We Performed the Following AMB POC URINALYSIS DIP STICK AUTO W/O MICRO [03219 CPT(R)] COLLECTION VENOUS BLOOD,VENIPUNCTURE [id number] CPT(R)] PSA, DIAGNOSTIC (PROSTATE SPECIFIC AG) [id number] CPT(R)] Follow-up Instructions Return in about 1 year (around 7/17/2019) for SEVERO. Patient Instructions Prostate Cancer Screening: Care Instructions Your Care Instructions The prostate gland is an organ found just below a man's bladder. It is the size and shape of a walnut. It surrounds the tube that carries urine from the bladder out of the body through the penis. This tube is called the urethra. Prostate cancer is the abnormal growth of cells in the prostate. It is the second most common type of cancer in men. (Skin cancer is the most common.) Most cases of prostate cancer occur in men older than 72. The disease runs in families. And it's more common in -American men. When it's found and treated early, prostate cancer may be cured. But it is not always treated. This is because prostate cancer may not shorten your life, especially if you are older and the cancer is growing slowly. Follow-up care is a key part of your treatment and safety. Be sure to make and go to all appointments, and call your doctor if you are having problems. It's also a good idea to know your test results and keep a list of the medicines you take. What are the screening tests for prostate cancer? The main screening test for prostate cancer is the prostate-specific antigen (PSA) test. This is a blood test that measures how much PSA is in your blood. A high level may mean that you have an enlargement, an infection, or cancer. Along with the PSA test, you may have a digital rectal exam. The digital (finger) rectal exam checks for anything abnormal in your prostate. To do the exam, the doctor puts a lubricated, gloved finger into your rectum. If these tests suggest cancer, you may need a prostate biopsy. How is prostate cancer diagnosed? In a biopsy, the doctor takes small tissue samples from your prostate gland. Another doctor then looks at the tissue under a microscope to see if there are cancer cells, signs of infection, or other problems. The results help diagnose prostate cancer. What are the pros and cons of screening? Neither a PSA test nor a digital rectal exam can tell you for sure that you do or do not have cancer. But they can help you decide if you need more tests, such as a prostate biopsy. Screening tests may be useful because most men with prostate cancer don't have symptoms.  It can be hard to know if you have cancer until it is more advanced. And then it's harder to treat. But having a PSA test can also cause harm. The test may show high levels of PSA that aren't caused by cancer. So you could have a prostate biopsy you didn't need. Or the PSA test might be normal when there is cancer, so a cancer might not be found early. The test can also find cancers that would never have caused a problem during your lifetime. So you might have treatment that was not needed. Prostate cancer usually develops late in life and grows slowly. For many men, it does not shorten their lives. Some experts advise screening only for men who are at high risk. Talk with your doctor to see if screening is right for you. Where can you learn more? Go to http://danny-luciano.info/. Enter R550 in the search box to learn more about \"Prostate Cancer Screening: Care Instructions. \" Current as of: May 12, 2017 Content Version: 11.7 © 7595-8308 [employer]. Care instructions adapted under license by [employer] (which disclaims liability or warranty for this information). If you have questions about a medical condition or this instruction, always ask your healthcare professional. [place] [address] any warranty or liability for your use of this information. Patient Instructions History Introducing [facility]! [employer] introduces [employer] patient portal. Now you can access parts of your medical record, email your doctor's office, and request medication refills online. 1. In your internet browser, go to [email or web address] 2. Click on the First Time User? Click Here link in the Sign In box. You will see the New Member Sign Up page. 3. Enter your [employer] Access Code exactly as it appears below. You will not need to use this code after youve completed the sign-up process.  If you do not sign up before the expiration date, you must request a new code. · [employer] Access Code: [email or web address] Expires: 10/15/2018  9:30 AM 
 
4. Enter the last four digits of your Social Security Number (xxxx) and Date of Birth (mm/dd/yyyy) as indicated and click Submit. You will be taken to the next sign-up page. 5. Create a [employer] ID. This will be your [employer] login ID and cannot be changed, so think of one that is secure and easy to remember. 6. Create a [employer] password. You can change your password at any time. 7. Enter your Password Reset Question and Answer. This can be used at a later time if you forget your password. 8. Enter your e-mail address. You will receive e-mail notification when new information is available in [address] 9. Click Sign Up. You can now view and download portions of your medical record. 10. Click the Download Summary menu link to download a portable copy of your medical information. If you have questions, please visit the Frequently Asked Questions section of the [employer] website. Remember, [employer] is NOT to be used for urgent needs. For medical emergencies, dial 911. Now available from your iPhone and Android! Please provide this summary of care documentation to your next provider. Your primary care clinician is listed as ADRIANA SUE. If you have any questions after today's visit, please call [phone].

## 2018-07-17 NOTE — PROGRESS NOTES
Francheska Hassan 80 y.o. male   Prostate Joshua Fortune Dr. Paul Chapman seen today for prostate carcinoma follow-up patient is voiding well with a solid stream good control with mild-stress incontinence managed with wearing external pad change once per day    PSA less than 0.1 in July 2015  PSA less than 0.1 in July 2016  PSA less than 0.1 in July 2017      Review of Systems:    CNS: No seizures syncope headaches or dizziness no visual changes  Respiratory: No shortness of breast or wheezing  Cardiovascular:No chest pain or palpitations  Intestinal:No constipation or dyspepsia  Urinary: No irritative or obstructive voiding symptoms  Skeletal: No bone or joint pain  Endocrine:No diabetes or thyroid disease  Other:    Allergies: No Known Allergies   Medications:    Current Outpatient Prescriptions   Medication Sig Dispense Refill    SYMBICORT 80-4.5 mcg/actuation HFAA inhaler       lisinopril-hydrochlorothiazide (PRINZIDE, ZESTORETIC) 10-12.5 mg per tablet Take 1 Tab by mouth daily.  budesonide-formoterol (SYMBICORT) 80-4.5 mcg/actuation HFAA INHALE 2 PUFFS INTO MOUTH ONCE DAILY      gabapentin (NEURONTIN) 100 mg capsule Take 1 tab PO BID PRN for nerve pain 45 Cap 0    albuterol (PROAIR HFA) 90 mcg/actuation inhaler Take 2 Puffs by inhalation daily.  Cetirizine (ZYRTEC) 10 mg cap Take  by mouth.  naproxen sodium (ALEVE) 220 mg cap Take  by mouth.  HYDROcodone-acetaminophen (NORCO) 5-325 mg per tablet Take 1 Tab by mouth every four (4) hours as needed for Pain. Max Daily Amount: 6 Tabs. 20 Tab 0    methocarbamol (ROBAXIN) 500 mg tablet Take 1 Tab by mouth four (4) times daily.  20 Tab 0       Past Medical History:   Diagnosis Date    Closed compression fracture of L3 lumbar vertebra ([facility] [address] 2/5/2018    Chronic aysmptomatic, noted on MRI 2018    Hypertension       Past Surgical History:   Procedure Laterality Date    HX CATARACT REMOVAL Left 09/2004    HX CATARACT REMOVAL Right 11/2004    HX HERNIA REPAIR  11/2001    HX PROSTATECTOMY      HX RETINAL DETACHMENT REPAIR  10/2003     History reviewed. No pertinent family history. Physical Examination: Well-nourished mature male and trim appearing    Digital rectal examination-no masses induration or tenderness-prostate nonpalpable      Urinalysis: Negative dipstick/nitrite negative/heme-negative    Impression: Prostate carcinoma-LUIS ALFREDO 21 years status post radical retropubic prostatectomy        Plan: PSA today    RTC 1 year PSA SEVERO      More than 1/2 of this  15 minute visit was spent in counselling and coordination of care, as described above. Brandon Zamudio MD  -electronically signed-    PLEASE NOTE:  This document has been produced using voice recognition software. Unrecognized errors in transcription may be present.

## 2018-07-17 NOTE — PATIENT INSTRUCTIONS
Prostate Cancer Screening: Care Instructions Your Care Instructions The prostate gland is an organ found just below a man's bladder. It is the size and shape of a walnut. It surrounds the tube that carries urine from the bladder out of the body through the penis. This tube is called the urethra. Prostate cancer is the abnormal growth of cells in the prostate. It is the second most common type of cancer in men. (Skin cancer is the most common.) Most cases of prostate cancer occur in men older than 72. The disease runs in families. And it's more common in -American men. When it's found and treated early, prostate cancer may be cured. But it is not always treated. This is because prostate cancer may not shorten your life, especially if you are older and the cancer is growing slowly. Follow-up care is a key part of your treatment and safety. Be sure to make and go to all appointments, and call your doctor if you are having problems. It's also a good idea to know your test results and keep a list of the medicines you take. What are the screening tests for prostate cancer? The main screening test for prostate cancer is the prostate-specific antigen (PSA) test. This is a blood test that measures how much PSA is in your blood. A high level may mean that you have an enlargement, an infection, or cancer. Along with the PSA test, you may have a digital rectal exam. The digital (finger) rectal exam checks for anything abnormal in your prostate. To do the exam, the doctor puts a lubricated, gloved finger into your rectum. If these tests suggest cancer, you may need a prostate biopsy. How is prostate cancer diagnosed? In a biopsy, the doctor takes small tissue samples from your prostate gland. Another doctor then looks at the tissue under a microscope to see if there are cancer cells, signs of infection, or other problems. The results help diagnose prostate cancer.  
What are the pros and cons of screening? Neither a PSA test nor a digital rectal exam can tell you for sure that you do or do not have cancer. But they can help you decide if you need more tests, such as a prostate biopsy. Screening tests may be useful because most men with prostate cancer don't have symptoms. It can be hard to know if you have cancer until it is more advanced. And then it's harder to treat. But having a PSA test can also cause harm. The test may show high levels of PSA that aren't caused by cancer. So you could have a prostate biopsy you didn't need. Or the PSA test might be normal when there is cancer, so a cancer might not be found early. The test can also find cancers that would never have caused a problem during your lifetime. So you might have treatment that was not needed. Prostate cancer usually develops late in life and grows slowly. For many men, it does not shorten their lives. Some experts advise screening only for men who are at high risk. Talk with your doctor to see if screening is right for you. Where can you learn more? Go to http://danny-luciano.info/. Enter R550 in the search box to learn more about \"Prostate Cancer Screening: Care Instructions. \" Current as of: May 12, 2017 Content Version: 11.7 © 0854-4699 [employer]. Care instructions adapted under license by [employer] (which disclaims liability or warranty for this information). If you have questions about a medical condition or this instruction, always ask your healthcare professional. [facility] any warranty or liability for your use of this information.

## 2018-07-18 LAB — PSA SERPL-MCNC: <0.1 NG/ML (ref 0–4)

## 2019-01-25 ENCOUNTER — OFFICE VISIT (OUTPATIENT)
Dept: ORTHOPEDIC SURGERY | Age: 84
End: 2019-01-25

## 2019-01-25 VITALS — WEIGHT: 185 LBS | HEIGHT: 69 IN | BODY MASS INDEX: 27.4 KG/M2

## 2019-01-25 DIAGNOSIS — M25.561 CHRONIC PAIN OF RIGHT KNEE: Primary | ICD-10-CM

## 2019-01-25 DIAGNOSIS — M17.11 PRIMARY OSTEOARTHRITIS OF RIGHT KNEE: ICD-10-CM

## 2019-01-25 DIAGNOSIS — G89.29 CHRONIC PAIN OF RIGHT KNEE: Primary | ICD-10-CM

## 2019-01-25 DIAGNOSIS — M17.0 PRIMARY OSTEOARTHRITIS OF BOTH KNEES: ICD-10-CM

## 2019-01-25 RX ORDER — DICLOFENAC SODIUM 10 MG/G
GEL TOPICAL 4 TIMES DAILY
Qty: 100 G | Refills: 4 | Status: SHIPPED | OUTPATIENT
Start: 2019-01-25 | End: 2022-08-03

## 2019-01-25 RX ORDER — BETAMETHASONE SODIUM PHOSPHATE AND BETAMETHASONE ACETATE 3; 3 MG/ML; MG/ML
6 INJECTION, SUSPENSION INTRA-ARTICULAR; INTRALESIONAL; INTRAMUSCULAR; SOFT TISSUE ONCE
Qty: 1 ML | Refills: 0
Start: 2019-01-25 | End: 2019-01-25

## 2019-01-25 NOTE — PROGRESS NOTES
[address] [place] [address], [address] [place] [address]  [phone]           Patient: Hafsa Beckett                MRN: [id number]       SSN: [id number]  YOB: 1930        AGE: 80 y.o. SEX: male  Body mass index is 27.32 kg/m². PCP: Frankie Keene MD  01/25/19      This office note has been dictated. REVIEW OF SYSTEMS:  Constitutional: Negative for fever, chills, weight loss and malaise/fatigue. HENT: Negative. Eyes: Negative. Respiratory: Negative. Cardiovascular: Negative. Gastrointestinal: No bowel incontinence or constipation. Genitourinary: No bladder incontinence or saddle anesthesia. Skin: Negative. Neurological: Negative. Endo/Heme/Allergies: Negative. Psychiatric/Behavioral: Negative. Musculoskeletal: As per HPI above. Past Medical History:   Diagnosis Date    Closed compression fracture of L3 lumbar vertebra ([facility] [address] 2/5/2018    Chronic aysmptomatic, noted on MRI 2018    Hypertension          Current Outpatient Medications:     budesonide-formoterol (SYMBICORT) 80-4.5 mcg/actuation HFAA, INHALE 2 PUFFS INTO MOUTH ONCE DAILY, Disp: , Rfl:     albuterol (PROAIR HFA) 90 mcg/actuation inhaler, Take 2 Puffs by inhalation daily. , Disp: , Rfl:     SYMBICORT 80-4.5 mcg/actuation HFAA inhaler, , Disp: , Rfl:     Cetirizine (ZYRTEC) 10 mg cap, Take  by mouth., Disp: , Rfl:     lisinopril-hydrochlorothiazide (PRINZIDE, ZESTORETIC) 10-12.5 mg per tablet, Take 1 Tab by mouth daily. , Disp: , Rfl:     gabapentin (NEURONTIN) 100 mg capsule, Take 1 tab PO BID PRN for nerve pain, Disp: 45 Cap, Rfl: 0    naproxen sodium (ALEVE) 220 mg cap, Take  by mouth., Disp: , Rfl:     HYDROcodone-acetaminophen (NORCO) 5-325 mg per tablet, Take 1 Tab by mouth every four (4) hours as needed for Pain. Max Daily Amount: 6 Tabs., Disp: 20 Tab, Rfl: 0    methocarbamol (ROBAXIN) 500 mg tablet, Take 1 Tab by mouth four (4) times daily. , Disp: 20 Tab, Rfl: 0    No Known Allergies    Social History     Socioeconomic History    Marital status:      Spouse name: Not on file    Number of children: Not on file    Years of education: Not on file    Highest education level: Not on file   Social Needs    Financial resource strain: Not on file    Food insecurity - worry: Not on file    Food insecurity - inability: Not on file    Transportation needs - medical: Not on file   Circl needs - non-medical: Not on file   Occupational History    Not on file   Tobacco Use    Smoking status: Former Smoker     Last attempt to quit: 1978     Years since quittin.0    Smokeless tobacco: Never Used   Substance and Sexual Activity    Alcohol use: Yes     Alcohol/week: 1.0 oz     Types: 2 Standard drinks or equivalent per week    Drug use: No    Sexual activity: Not on file   Other Topics Concern    Not on file   Social History Narrative    Not on file       Past Surgical History:   Procedure Laterality Date    HX CATARACT REMOVAL Left 2004    HX CATARACT REMOVAL Right 2004    HX HERNIA REPAIR  2001    HX PROSTATECTOMY      HX RETINAL DETACHMENT REPAIR  10/2003           * Patient was identified by name and date of birth   * Agreement on procedure being performed was verified  * Risks and Benefits explained to the patient  * Procedure site verified and marked as necessary  * Patient was positioned for comfort  * Consent was signed and verified  9:52 AM    The patient was instructed on post injection care. We did see Mr. Nori Haq today in the office for evaluation and opinion and advice regarding right knee pain. He was seen in the past for his hip. He is doing well with that at this point in time. With regards to the knee, he is having increasing discomfort of the right knee. He has been a patient of Dr. Evan Horvath in the past.  He has had viscosupplementation.   It has been a number of years since that was done.  He has had no injuries to the knee and no fevers or chills. He has had no systemic changes to report. He does have decreased walking tolerance, trouble getting up from a chair and going up and down stairs. He has no night pain currently. He is quite healthy at 80. He takes only a blood pressure medicine. PHYSICAL EXAMINATION:  In general, the patient is alert and oriented x 3 in no acute distress. The patient is well-developed, well-nourished, with a normal affect. The patient is afebrile. HEENT:  Head is normocephalic and atraumatic. Pupils are equally round and reactive to light and accommodation. Extraocular eye movements are intact. Neck is supple. Trachea is midline. No JVD is present. Breathing is nonlabored. Examination of the lower extremities reveals pain-free range of motion of the hips. There is no pain to palpation of the greater trochanteric bursae. There is negative straight leg raise. There is negative calf tenderness. There is negative Zenias. There is no evidence of DVT present. The right knee reveals the skin is intact. There is no ecchymosis and no warmth. He has negative joint effusion, negative patellar ballottement, and no signs for infection or cellulitis present. There is pain to palpation to the medial joint line, as well as patellofemoral grind and crepitus anteriorly with range of motion activities noted. RADIOGRAPHS:  Radiographs in the office today, including AP, tunnel, lateral, and skyline of the right knee, does confirm end-staged arthritis of the medial and patellofemoral articulation. No acute bony abnormalities are noted. ASSESSMENT:  Bilateral knee end-staged arthritis, right greater than left. PLAN:  At this point, we discussed treatment options. We should try to maximize his nonoperative treatment. It has been quite some time since he had any injections.   We will try a cortisone injection today to see if we can calm the knee down for him. After informed and written consent, and appropriate time out performed, under sterile conditions, with ultrasound-guided assistance, the right knee was prepped with Betadine and 6 mg of Celestone was injected without complications. The patient tolerated the injection well. The patient was instructed on post injection care. We will get a series of viscosupplementation preapproved by insurance, as again, we will try to maximize his nonoperative treatment. We will see him back in about four weeks time for evaluation and to begin the series of injections. He will call with any questions or concerns that shall arise.                      JR Shaun GONZALEZ, PAYaniC, ATC

## 2019-02-08 ENCOUNTER — HOSPITAL ENCOUNTER (EMERGENCY)
Age: 84
Discharge: HOME | End: 2019-02-08
Attending: EMERGENCY MEDICINE
Payer: MEDICARE

## 2019-02-08 ENCOUNTER — OFFICE VISIT (OUTPATIENT)
Dept: ORTHOPEDIC SURGERY | Facility: CLINIC | Age: 84
End: 2019-02-08

## 2019-02-08 ENCOUNTER — APPOINTMENT (OUTPATIENT)
Dept: GENERAL RADIOLOGY | Age: 84
End: 2019-02-08
Attending: EMERGENCY MEDICINE
Payer: MEDICARE

## 2019-02-08 VITALS
RESPIRATION RATE: 18 BRPM | WEIGHT: 183.4 LBS | BODY MASS INDEX: 27.16 KG/M2 | HEART RATE: 76 BPM | DIASTOLIC BLOOD PRESSURE: 59 MMHG | TEMPERATURE: 98.9 F | HEIGHT: 69 IN | SYSTOLIC BLOOD PRESSURE: 108 MMHG | OXYGEN SATURATION: 95 %

## 2019-02-08 VITALS
RESPIRATION RATE: 20 BRPM | TEMPERATURE: 98 F | SYSTOLIC BLOOD PRESSURE: 125 MMHG | HEART RATE: 69 BPM | OXYGEN SATURATION: 95 % | HEIGHT: 69 IN | DIASTOLIC BLOOD PRESSURE: 67 MMHG | WEIGHT: 190 LBS | BODY MASS INDEX: 28.14 KG/M2

## 2019-02-08 DIAGNOSIS — G89.29 CHRONIC RIGHT SHOULDER PAIN: Primary | ICD-10-CM

## 2019-02-08 DIAGNOSIS — M85.811 OSTEOPENIA OF RIGHT SHOULDER: ICD-10-CM

## 2019-02-08 DIAGNOSIS — S46.911A STRAIN OF RIGHT SHOULDER, INITIAL ENCOUNTER: Primary | ICD-10-CM

## 2019-02-08 DIAGNOSIS — M25.511 CHRONIC RIGHT SHOULDER PAIN: Primary | ICD-10-CM

## 2019-02-08 DIAGNOSIS — M75.51 ACUTE BURSITIS OF RIGHT SHOULDER: ICD-10-CM

## 2019-02-08 DIAGNOSIS — S46.911A STRAIN OF RIGHT SHOULDER, INITIAL ENCOUNTER: ICD-10-CM

## 2019-02-08 PROCEDURE — 73030 X-RAY EXAM OF SHOULDER: CPT

## 2019-02-08 PROCEDURE — 99281 EMR DPT VST MAYX REQ PHY/QHP: CPT

## 2019-02-08 RX ORDER — CHOLECALCIFEROL (VITAMIN D3) 125 MCG
1 CAPSULE ORAL 2 TIMES DAILY
Qty: 30 CAP | Refills: 0 | Status: SHIPPED | OUTPATIENT
Start: 2019-02-08 | End: 2022-08-03

## 2019-02-08 NOTE — ED PROVIDER NOTES
EMERGENCY DEPARTMENT HISTORY AND PHYSICAL EXAM    10:37 AM      Date: 2/8/2019  Patient Name: Zuhair Lam    History of Presenting Illness     Chief Complaint   Patient presents with    Shoulder Pain         History Provided By: Patient      Additional History (Context): Zuhair Lam is a 80 y.o. male with No significant past medical history who presents with acute and improving right shoulder pain s/p trying to tighten a screw at work yesterday. He states the pain is exacerbated by trying to move arm up and down but not with rotating arm. He tried biofreeze with minimal relief. He notes that he sees Dr. Catrina Anderson, orthopaedics, and has an appointment in 2 weeks for chronic issue but could not get one sooner for shoulder; he has hx of Cortizone injections into right knee. Patient states that his 4th and 5th fingers in right hand also \"moving on their own\" since injury. No other concerns or symptoms at this time. PCP: Lynda Haile MD    Chief Complaint: shoulder pain  Duration: 1 Days  Timing:  Acute and Improving  Location: right shoulder  Quality: Aching  Severity: Mild  Modifying Factors: biofreeze, lifting arm  Associated Symptoms: denies any other associated signs or symptoms    Current Outpatient Medications   Medication Sig Dispense Refill    gabapentin (NEURONTIN) 100 mg capsule Take 1 tab PO BID PRN for nerve pain 45 Cap 0    albuterol (PROAIR HFA) 90 mcg/actuation inhaler Take 2 Puffs by inhalation daily.  diclofenac (VOLTAREN) 1 % gel Apply  to affected area four (4) times daily. 100 g 4    budesonide-formoterol (SYMBICORT) 80-4.5 mcg/actuation HFAA INHALE 2 PUFFS INTO MOUTH ONCE DAILY      SYMBICORT 80-4.5 mcg/actuation HFAA inhaler       Cetirizine (ZYRTEC) 10 mg cap Take  by mouth.  naproxen sodium (ALEVE) 220 mg cap Take  by mouth.  lisinopril-hydrochlorothiazide (PRINZIDE, ZESTORETIC) 10-12.5 mg per tablet Take 1 Tab by mouth daily.          Past History     Past Medical History:  Past Medical History:   Diagnosis Date    Closed compression fracture of L3 lumbar vertebra ([address] 2018    Chronic aysmptomatic, noted on MRI 2018    Hypertension        Past Surgical History:  Past Surgical History:   Procedure Laterality Date    HX CATARACT REMOVAL Left 2004    HX CATARACT REMOVAL Right 2004    HX HERNIA REPAIR  2001    HX PROSTATECTOMY      HX RETINAL DETACHMENT REPAIR  10/2003       Family History:  History reviewed. No pertinent family history. Social History:  Social History     Tobacco Use    Smoking status: Former Smoker     Last attempt to quit: 1978     Years since quittin.1    Smokeless tobacco: Never Used   Substance Use Topics    Alcohol use: Yes     Alcohol/week: 1.0 oz     Types: 2 Standard drinks or equivalent per week    Drug use: No       Allergies:  No Known Allergies      Review of Systems       Review of Systems   Constitutional: Negative for fever. Musculoskeletal: Negative for back pain and neck pain. All other systems reviewed and are negative. Physical Exam     Visit Vitals  /67 (BP 1 Location: Left arm, BP Patient Position: At rest)   Pulse 69   Temp 98 °F (36.7 °C)   Resp 20   Ht 5' 9\" (1.753 m)   Wt 86.2 kg (190 lb)   SpO2 95%   BMI 28.06 kg/m²         Physical Exam   Constitutional: He is oriented to person, place, and time. He appears well-developed and well-nourished. No distress. HENT:   Head: Normocephalic and atraumatic. Eyes: No scleral icterus. Cardiovascular: Normal rate. Pulmonary/Chest: Effort normal.   Musculoskeletal:   Mild tenderness over ant deltoid head w/o step off or deformity. No STS or erythema. No point bony tenderness. Distal neurovasc intact. Neurological: He is alert and oriented to person, place, and time. Skin: Skin is warm and dry. Psychiatric: He has a normal mood and affect. Nursing note and vitals reviewed.         Diagnostic Study Results     Labs -  No results found for this or any previous visit (from the past 12 hour(s)). Radiologic Studies -   XR SHOULDER RT AP/LAT MIN 2 V   Final Result   IMPRESSION:      No acute finding. Demineralization. Calcified pleural plaques            Medical Decision Making     It should be noted that I, Janice Schafer MD will be the provider of record for this patient. I reviewed the vital signs, available nursing notes, past medical history, past surgical history, family history and social history. Vital Signs-Reviewed the patient's vital signs. Records Reviewed: Nursing Notes (Time of Review: 10:37 AM)    ED Course: Progress Notes, Reevaluation, and Consults:  Imp; Shoulder strain, deltoid tendonitis    Diagnosis     Clinical Impression:   1. Strain of right shoulder, initial encounter      1) R. I.C.E. (rest, ice, compression, elevation)  2) Light duty, limited use of affected extremity next 3-7 days  3) Naproxen for inflammation  4) Follow up with your Orthopedist for recheck in 2 weeks as scheduled   5) X-ray with degenerative changes, nothing acute    Disposition: Discharge    Follow-up Information     Follow up With Specialties Details Why Contact Info    Eneida Nunez Physician Assistant   [address]  [phone]                Medication List      ASK your doctor about these medications    ALEVE 220 mg Cap  Generic drug:  naproxen sodium     diclofenac 1 % Gel  Commonly known as:  VOLTAREN  Apply  to affected area four (4) times daily.      gabapentin 100 mg capsule  Commonly known as:  NEURONTIN  Take 1 tab PO BID PRN for nerve pain     lisinopril-hydroCHLOROthiazide 10-12.5 mg per tablet  Commonly known as:  PRINZIDE, ZESTORETIC     PROAIR HFA 90 mcg/actuation inhaler  Generic drug:  albuterol     * budesonide-formoterol 80-4.5 mcg/actuation Hfaa  Commonly known as:  SYMBICORT     * SYMBICORT 80-4.5 mcg/actuation Hfaa  Generic drug:  budesonide-formoterol ZyrTEC 10 mg Cap  Generic drug:  Cetirizine         * This list has 2 medication(s) that are the same as other medications prescribed for you. Read the directions carefully, and ask your doctor or other care provider to review them with you.              _______________________________       Scribe [address] acting as a scribe for and in the presence of Hunter Ruiz MD      February 08, 2019 at 10:37 AM       Provider Attestation:      I personally performed the services described in the documentation, reviewed the documentation, as recorded by the scribe in my presence, and it accurately and completely records my words and actions.  February 08, 2019 at 10:37 AM - Hunter Ruiz MD        _______________________________

## 2019-02-08 NOTE — PROGRESS NOTES
Patient: Nancy Gonzales                MRN: [id number]       SSN: [id number] YOB: 1930        AGE: 80 y.o. SEX: male PCP: Eveline Smith MD 
02/08/19 Chief Complaint Patient presents with  Shoulder Pain Right HISTORY:  Nancy Gonzales is a 80 y.o. male who sustained a right shoulder injury on 2/7/19. He reports he was driving a screw overhead when he heard something snap in his right shoulder. Afterward he felt decreased strength in his right shoulder and arm. He noted initial inability to raise his arm actively above 90 degrees but his active motion has now returned. He is not having much pain at this time especially when he keeps his arm still. His daughter Jose Simon wanted him to come in for evaluation. Pain Assessment  2/8/2019 Location of Pain Shoulder Location Modifiers Right Severity of Pain 6 Quality of Pain Aching Duration of Pain Persistent Frequency of Pain - Aggravating Factors Bending;Stretching;Straightening Limiting Behavior Yes Relieving Factors Other (Comment) Relieving Factors Comment Biofreeze Result of Injury No  
 
Occupation, etc:  Mr. Fide Azul is retired  living alone in [place]. His wife passed 27 years ago. His oldest daughter Jose Simon is like his mother. He states that he is not fond of doctors. 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 He states that he does not like care home. He retired in his 63's after a prostatectomy for prostate cancer. Current weight is 183 pounds. He is 5'9\" tall. No results found for: HBA1C, HGBE8, LPW8SOVO, AFH2RMUI, IRH2VJSW Weight Metrics 2/8/2019 2/8/2019 1/25/2019 7/17/2018 2/5/2018 1/11/2018 12/22/2017 Weight 183 lb 6.4 oz 190 lb 185 lb 185 lb 191 lb 190 lb 190 lb 12.8 oz  
BMI 27.08 kg/m2 28.06 kg/m2 27.32 kg/m2 27.32 kg/m2 28.21 kg/m2 28.06 kg/m2 28.18 kg/m2 Patient Active Problem List  
Diagnosis Code  Closed compression fracture of L3 lumbar vertebra ([facility]) S32.030A REVIEW OF SYSTEMS: All Below are Negative except: See HPI Constitutional: negative for fever, chills, and weight loss. Cardiovascular: negative for chest pain, claudication, leg swelling, SOB, SALCIDO Gastrointestinal: Negative for pain, N/V/C/D, Blood in stool or urine, dysuria,  hematuria, incontinence, pelvic pain. Musculoskeletal: See HPI Neurological: Negative for dizziness and weakness. Negative for headaches, Visual changes, confusion, seizures Phychiatric/Behavioral: Negative for depression, memory loss, substance  abuse. Extremities: Negative for hair changes, rash, or skin lesion changes. Hematologic: Negative for bleeding problems, bruising, pallor or swollen lymph  nodes Peripheral Vascular: No calf pain, no circulation deficits. Social History Socioeconomic History  Marital status:  Spouse name: Not on file  Number of children: Not on file  Years of education: Not on file  Highest education level: Not on file Social Needs  Financial resource strain: Not on file  Food insecurity - worry: Not on file  Food insecurity - inability: Not on file  Transportation needs - medical: Not on file  Transportation needs - non-medical: Not on file Occupational History  Not on file Tobacco Use  Smoking status: Former Smoker Last attempt to quit: 1978 Years since quittin.1  Smokeless tobacco: Never Used Substance and Sexual Activity  Alcohol use: Yes Alcohol/week: 1.0 oz Types: 2 Standard drinks or equivalent per week  Drug use: No  
 Sexual activity: Not on file Other Topics Concern  Not on file Social History Narrative  Not on file No Known Allergies Current Outpatient Medications Medication Sig  
 naproxen sodium (ALEVE) 220 mg cap Take 1 Cap by mouth two (2) times a day. Prn pain  diclofenac (VOLTAREN) 1 % gel Apply  to affected area four (4) times daily.  budesonide-formoterol (SYMBICORT) 80-4.5 mcg/actuation HFAA INHALE 2 PUFFS INTO MOUTH ONCE DAILY  gabapentin (NEURONTIN) 100 mg capsule Take 1 tab PO BID PRN for nerve pain  albuterol (PROAIR HFA) 90 mcg/actuation inhaler Take 2 Puffs by inhalation daily.  SYMBICORT 80-4.5 mcg/actuation HFAA inhaler  Cetirizine (ZYRTEC) 10 mg cap Take  by mouth.  lisinopril-hydrochlorothiazide (PRINZIDE, ZESTORETIC) 10-12.5 mg per tablet Take 1 Tab by mouth daily. No current facility-administered medications for this visit. PHYSICAL EXAMINATION: 
Visit Vitals /59 Pulse 76 Temp 98.9 °F (37.2 °C) (Oral) Resp 18 Ht 5' 9\" (1.753 m) Wt 183 lb 6.4 oz (83.2 kg) SpO2 95% BMI 27.08 kg/m² ORTHO EXAMINATION: 
Examination Right shoulder Left shoulder Skin Intact Intact Effusion - - Biceps deformity - - Atrophy - -  
AC joint tenderness - - Acromial tenderness + - Biceps tenderness - - Forward flexion/Elevation  170 Active abduction  160 External rotation ROM 10 30 Internal rotation ROM 60 90 Apprehension - - Impingement - - Drop Arm Test - - Neurovascular Intact Intact Distal biceps intact RADIOGRAPHS: 
XR RIGHT SHOULDER 2/8/19 [facility] IMPRESSION: 
No acute finding. Demineralization. Calcified pleural plaques 
-I have independently reviewed these images during this office visit. -Dr. Villegas Floor IMPRESSION:  Three views - No fractures, mild acromioclavicular narrowing, mil glenohumeral narrowing, acromial calcific densities. Type 3 acromion. Osteopenia IMPRESSION:   
  ICD-10-CM ICD-9-CM 1. Chronic right shoulder pain M25.511 719.41   
 G89.29 338.29   
2. Osteopenia of right shoulder M85.811 733.90   
3. Acute bursitis of right shoulder M75.51 726.10   
4. Strain of right shoulder, initial encounter S46.911A 840.9 PLAN:  There is no need for surgery or injection at this time. He is not interested in physical therapy. Shoulder exercises. He will follow up as needed.    
 
Scribed by Ivan Rodriguez ([address]) as dictated by Merleen Nyhan, MD

## 2019-02-08 NOTE — DISCHARGE INSTRUCTIONS
1) R.I.C.E. (rest, ice, compression, elevation)  2) Light duty, limited use of affected extremity next 3-7 days  3) Naproxen for inflammation  4) Follow up with your Orthopedist for recheck in 2 weeks as scheduled   5) X-ray with degenerative changes, nothing acute

## 2019-02-22 ENCOUNTER — OFFICE VISIT (OUTPATIENT)
Dept: ORTHOPEDIC SURGERY | Age: 84
End: 2019-02-22

## 2019-02-22 VITALS
HEIGHT: 69 IN | DIASTOLIC BLOOD PRESSURE: 85 MMHG | RESPIRATION RATE: 16 BRPM | HEART RATE: 74 BPM | WEIGHT: 186 LBS | SYSTOLIC BLOOD PRESSURE: 136 MMHG | OXYGEN SATURATION: 97 % | TEMPERATURE: 95.9 F | BODY MASS INDEX: 27.55 KG/M2

## 2019-02-22 DIAGNOSIS — M17.11 PRIMARY OSTEOARTHRITIS OF RIGHT KNEE: Primary | ICD-10-CM

## 2019-02-22 NOTE — PROGRESS NOTES
[address], [address] [place] [address]  [phone]           Patient: Zuhair Lam                MRN: [id number]       SSN: [id number]  YOB: 1930        AGE: 80 y.o. SEX: male  Body mass index is 27.47 kg/m². PCP: Lynda Haile MD  02/22/19      This office note has been dictated. REVIEW OF SYSTEMS:  Constitutional: Negative for fever, chills, weight loss and malaise/fatigue. HENT: Negative. Eyes: Negative. Respiratory: Negative. Cardiovascular: Negative. Gastrointestinal: No bowel incontinence or constipation. Genitourinary: No bladder incontinence or saddle anesthesia. Skin: Negative. Neurological: Negative. Endo/Heme/Allergies: Negative. Psychiatric/Behavioral: Negative. Musculoskeletal: As per HPI above. Past Medical History:   Diagnosis Date    Closed compression fracture of L3 lumbar vertebra ([address] 2/5/2018    Chronic aysmptomatic, noted on MRI 2018    Hypertension          Current Outpatient Medications:     diclofenac (VOLTAREN) 1 % gel, Apply  to affected area four (4) times daily. , Disp: 100 g, Rfl: 4    gabapentin (NEURONTIN) 100 mg capsule, Take 1 tab PO BID PRN for nerve pain, Disp: 45 Cap, Rfl: 0    albuterol (PROAIR HFA) 90 mcg/actuation inhaler, Take 2 Puffs by inhalation daily. , Disp: , Rfl:     SYMBICORT 80-4.5 mcg/actuation HFAA inhaler, , Disp: , Rfl:     Cetirizine (ZYRTEC) 10 mg cap, Take  by mouth., Disp: , Rfl:     lisinopril-hydrochlorothiazide (PRINZIDE, ZESTORETIC) 10-12.5 mg per tablet, Take 1 Tab by mouth daily. , Disp: , Rfl:     naproxen sodium (ALEVE) 220 mg cap, Take 1 Cap by mouth two (2) times a day.  Prn pain, Disp: 30 Cap, Rfl: 0    budesonide-formoterol (SYMBICORT) 80-4.5 mcg/actuation HFAA, INHALE 2 PUFFS INTO MOUTH ONCE DAILY, Disp: , Rfl:     No Known Allergies    Social History     Socioeconomic History    Marital status:      Spouse name: Not on file    Number of children: Not on file    Years of education: Not on file    Highest education level: Not on file   Social Needs    Financial resource strain: Not on file    Food insecurity - worry: Not on file    Food insecurity - inability: Not on file    Transportation needs - medical: Not on file   [employer] needs - non-medical: Not on file   Occupational History    Not on file   Tobacco Use    Smoking status: Former Smoker     Last attempt to quit: 1978     Years since quittin.1    Smokeless tobacco: Never Used   Substance and Sexual Activity    Alcohol use: Yes     Alcohol/week: 1.0 oz     Types: 2 Standard drinks or equivalent per week    Drug use: No    Sexual activity: Not on file   Other Topics Concern    Not on file   Social History Narrative    Not on file       Past Surgical History:   Procedure Laterality Date    HX CATARACT REMOVAL Left 2004    HX CATARACT REMOVAL Right 2004    HX HERNIA REPAIR  2001    HX PROSTATECTOMY      HX RETINAL DETACHMENT REPAIR  10/2003           * Patient was identified by name and date of birth   * Agreement on procedure being performed was verified  * Risks and Benefits explained to the patient  * Procedure site verified and marked as necessary  * Patient was positioned for comfort  * Consent was signed and verified  8:56 AM    The patient was instructed on post injection care. We did see Mr. Olga Cast today for followup with regards to his right knee. The patient has known advanced arthritis of the right knee. We gave him a cortisone injection about one month ago, and it did not help a whole lot. It gave him some relief. He is still having discomfort in his knee. He is trying to do his normal daily activities and is having some difficulty in doing so. He has trouble getting up from a chair and going up and down stairs. He does report a little night pain.   He recently had some shoulder trouble and was seen by Dr. Kip Kumar and diagnosed with a shoulder sprain. PHYSICAL EXAMINATION:  In general, the patient is alert and oriented x 3 in no acute distress. The patient is well-developed, well-nourished, with a normal affect. The patient is afebrile. HEENT:  Head is normocephalic and atraumatic. Pupils are equally round and reactive to light and accommodation. Extraocular eye movements are intact. Neck is supple. Trachea is midline. No JVD is present. Breathing is nonlabored. Examination of the lower extremities reveals pain-free range of motion of the hips. There is no pain to palpation of the greater trochanteric bursae. There is negative straight leg raise. There is negative calf tenderness. There is negative Zenias. There is no evidence of DVT present. The right knee reveals the skin is intact. There is no ecchymosis, no warmth, and no signs of infection or cellulitis present. He does have findings consistent with advanced arthritis and pain to palpation to the medial joint line, as well as patellofemoral grind and crepitus anteriorly with range of motion activities noted. Range of motion is pretty well preserved. RADIOGRAPHS:  Review of previous radiographs does confirm end-staged arthritis of the right knee with bone-to-bone eburnation to the medial and patellofemoral articulations. ASSESSMENT:  Right knee advanced osteoarthritis. PLAN:  At this point, we discussed treatment options. We are going to move forward with a sample of Durolane, one-shot viscosupplementation. Today, after informed and written consent, and appropriate time out performed, under sterile conditions, with ultrasound-guided assistance, the right knee was prepped with Betadine and the Durolane injection was placed without complications. The patient tolerated the injection well. The patient was instructed on post injection care.   We will plan on seeing him back in the office in about four to six weeks time for evaluation and to  the efficacy of the injection. He will call with any questions or concerns that shall arise.                    JR Shaun GONZALEZ, TY, ATC

## 2019-04-05 ENCOUNTER — OFFICE VISIT (OUTPATIENT)
Dept: ORTHOPEDIC SURGERY | Age: 84
End: 2019-04-05

## 2019-04-05 VITALS
SYSTOLIC BLOOD PRESSURE: 136 MMHG | WEIGHT: 185 LBS | OXYGEN SATURATION: 96 % | TEMPERATURE: 97.9 F | HEIGHT: 69 IN | DIASTOLIC BLOOD PRESSURE: 77 MMHG | HEART RATE: 77 BPM | RESPIRATION RATE: 16 BRPM | BODY MASS INDEX: 27.4 KG/M2

## 2019-04-05 DIAGNOSIS — M85.811 OSTEOPENIA OF RIGHT SHOULDER: ICD-10-CM

## 2019-04-05 DIAGNOSIS — M25.511 CHRONIC RIGHT SHOULDER PAIN: Primary | ICD-10-CM

## 2019-04-05 DIAGNOSIS — G89.29 CHRONIC RIGHT SHOULDER PAIN: Primary | ICD-10-CM

## 2019-04-05 DIAGNOSIS — M75.51 ACUTE BURSITIS OF RIGHT SHOULDER: ICD-10-CM

## 2019-04-05 DIAGNOSIS — M19.011 PRIMARY OSTEOARTHRITIS OF RIGHT SHOULDER: ICD-10-CM

## 2019-04-05 DIAGNOSIS — M17.0 PRIMARY OSTEOARTHRITIS OF BOTH KNEES: ICD-10-CM

## 2019-04-05 RX ORDER — TRIAMCINOLONE ACETONIDE 40 MG/ML
40 INJECTION, SUSPENSION INTRA-ARTICULAR; INTRAMUSCULAR ONCE
Qty: 1 ML | Refills: 0
Start: 2019-04-05 | End: 2019-04-05

## 2019-04-05 NOTE — PROGRESS NOTES
1. Have you been to the ER, urgent care clinic since your last visit? Hospitalized since your last visit? NO    2. Have you seen or consulted any other health care providers outside of the [address] [place] [address] since your last visit? Include any pap smears or colon screening.  NO

## 2019-04-05 NOTE — PROGRESS NOTES
[address] [place] [address], [address] [place] [address]  [phone]           Patient: Noah Mccracken                MRN: [id number]       SSN: [id number]  YOB: 1930        AGE: 80 y.o. SEX: male  Body mass index is 27.32 kg/m². PCP: Filomena Damon MD  04/05/19      This office note has been dictated. REVIEW OF SYSTEMS:  Constitutional: Negative for fever, chills, weight loss and malaise/fatigue. HENT: Negative. Eyes: Negative. Respiratory: Negative. Cardiovascular: Negative. Gastrointestinal: No bowel incontinence or constipation. Genitourinary: No bladder incontinence or saddle anesthesia. Skin: Negative. Neurological: Negative. Endo/Heme/Allergies: Negative. Psychiatric/Behavioral: Negative. Musculoskeletal: As per HPI above. Past Medical History:   Diagnosis Date    Closed compression fracture of L3 lumbar vertebra 2/5/2018    Chronic aysmptomatic, noted on MRI 2018    Hypertension          Current Outpatient Medications:     naproxen sodium (ALEVE) 220 mg cap, Take 1 Cap by mouth two (2) times a day. Prn pain, Disp: 30 Cap, Rfl: 0    diclofenac (VOLTAREN) 1 % gel, Apply  to affected area four (4) times daily. , Disp: 100 g, Rfl: 4    budesonide-formoterol (SYMBICORT) 80-4.5 mcg/actuation HFAA, INHALE 2 PUFFS INTO MOUTH ONCE DAILY, Disp: , Rfl:     albuterol (PROAIR HFA) 90 mcg/actuation inhaler, Take 2 Puffs by inhalation daily. , Disp: , Rfl:     SYMBICORT 80-4.5 mcg/actuation HFAA inhaler, , Disp: , Rfl:     Cetirizine (ZYRTEC) 10 mg cap, Take  by mouth., Disp: , Rfl:     lisinopril-hydrochlorothiazide (PRINZIDE, ZESTORETIC) 10-12.5 mg per tablet, Take 1 Tab by mouth daily. , Disp: , Rfl:     gabapentin (NEURONTIN) 100 mg capsule, Take 1 tab PO BID PRN for nerve pain, Disp: 45 Cap, Rfl: 0    No Known Allergies    Social History     Socioeconomic History    Marital status:      Spouse name: Not on file    Number of children: Not on file    Years of education: Not on file    Highest education level: Not on file   Occupational History    Not on file   Social Needs    Financial resource strain: Not on file    Food insecurity:     Worry: Not on file     Inability: Not on file    Transportation needs:     Medical: Not on file     Non-medical: Not on file   Tobacco Use    Smoking status: Former Smoker     Last attempt to quit: 1978     Years since quittin.2    Smokeless tobacco: Never Used   Substance and Sexual Activity    Alcohol use:  Yes     Alcohol/week: 1.0 oz     Types: 2 Standard drinks or equivalent per week    Drug use: No    Sexual activity: Not on file   Lifestyle    Physical activity:     Days per week: Not on file     Minutes per session: Not on file    Stress: Not on file   Relationships    Social connections:     Talks on phone: Not on file     Gets together: Not on file     Attends [employer] service: Not on file     Active member of club or organization: Not on file     Attends meetings of clubs or organizations: Not on file     Relationship status: Not on file    Intimate partner violence:     Fear of current or ex partner: Not on file     Emotionally abused: Not on file     Physically abused: Not on file     Forced sexual activity: Not on file   Other Topics Concern    Not on file   Social History Narrative    Not on file       Past Surgical History:   Procedure Laterality Date    HX CATARACT REMOVAL Left 2004    HX CATARACT REMOVAL Right 2004    HX HERNIA REPAIR  2001    HX PROSTATECTOMY      [address] RETINAL DETACHMENT REPAIR  10/2003           * Patient was identified by name and date of birth   * Agreement on procedure being performed was verified  * Risks and Benefits explained to the patient  * Procedure site verified and marked as necessary  * Patient was positioned for comfort  * Consent was signed and verified  8:59 AM    The patient was instructed on post injection care. We did see Mr. Nichole Mac for followup with regards to his right knee, as well as his right shoulder. I gave him a viscosupplementation injection for the right knee of Durolane, which gave him moderate relief. The knee is feeling better. He had no relief from cortisone. His main complaint is that of his shoulder today. He is having some right shoulder pain, which has been ongoing. He is quite active. He was trimming some bushes with the  and had some discomfort in his shoulder. He has had previous x-rays at the hospital.      He has had no injuries and no falls to report. He denies any chest pain or shortness of breath. PHYSICAL EXAMINATION:  In general, the patient is alert and oriented x 3 in no acute distress. The patient is well-developed, well-nourished, with a normal affect. The patient is afebrile. HEENT:  Head is normocephalic and atraumatic. Pupils are equally round and reactive to light and accommodation. Extraocular eye movements are intact. Neck is supple. Trachea is midline. No JVD is present. Breathing is nonlabored. Examination of the neck reveals good range of motion of the cervical spine without reproduction of symptoms. The right shoulder reveals the skin is intact. There is no ecchymosis and no warmth. There are no signs for infection or cellulitis present. He does have discomfort to palpation of the subacromial space. Range of motion is about 90ø of forward flexion and 90ø of abduction. There is decreased strength in external rotation and a positive Mcgraw'. There is a positive Neer's and a negative drop-arm, Speed's, and Emmet's. There is no pain noted anteriorly. Radial pulse is 2+, and capillary refill is within normal limits. Examination of the lower extremities reveals pain-free range of motion of the hips. There is no pain to palpation of the greater trochanteric bursae.   There is negative straight leg raise.  There is negative calf tenderness. There is negative Zenia's. There is no evidence of DVT present. Examination of the knees reveals the skin is intact. There are no signs for infection or cellulitis present. RADIOGRAPHS:  Review of previous radiographs of the knees does confirm end-staged arthritis of each of the knees with bone-to-bone eburnation. Review of shoulder radiographs, three views, shows moderately advanced arthritis, type two acromion, and no fracture or dislocation is seen. ASSESSMENT:      1. Right shoulder subacromial bursitis, impingement syndrome, rotator cuff pathology, and osteoarthritis. 2. Bilateral knee advanced osteoarthritis. PLAN:  At this point, the patient did well with the viscosupplementation for the knee. We will plan on doing this again in six months. With regards to the shoulder, we are going to move forward with a cortisone injection today. After informed and written consent, and appropriate time out performed, under sterile conditions, with ultrasound-guided assistance, the right shoulder was prepped with Betadine and 40 mg of Kenalog was injected to the right shoulder without complications. The patient tolerated the injection well. The patient was instructed on post injection care. We will see him back in the office in about three months' time for evaluation. He will call with any questions or concerns that shall arise.                        JR Shaun GONZALEZ, TY, ATC

## 2019-07-12 ENCOUNTER — OFFICE VISIT (OUTPATIENT)
Dept: ORTHOPEDIC SURGERY | Age: 84
End: 2019-07-12

## 2019-07-12 ENCOUNTER — DOCUMENTATION ONLY (OUTPATIENT)
Dept: ORTHOPEDIC SURGERY | Age: 84
End: 2019-07-12

## 2019-07-12 VITALS
HEART RATE: 77 BPM | WEIGHT: 183.4 LBS | DIASTOLIC BLOOD PRESSURE: 64 MMHG | OXYGEN SATURATION: 96 % | HEIGHT: 69 IN | SYSTOLIC BLOOD PRESSURE: 112 MMHG | BODY MASS INDEX: 27.16 KG/M2 | TEMPERATURE: 97.5 F

## 2019-07-12 DIAGNOSIS — M25.561 CHRONIC PAIN OF RIGHT KNEE: ICD-10-CM

## 2019-07-12 DIAGNOSIS — G89.29 CHRONIC PAIN OF RIGHT KNEE: ICD-10-CM

## 2019-07-12 DIAGNOSIS — M75.51 ACUTE BURSITIS OF RIGHT SHOULDER: ICD-10-CM

## 2019-07-12 DIAGNOSIS — M17.11 PRIMARY OSTEOARTHRITIS OF RIGHT KNEE: Primary | ICD-10-CM

## 2019-07-12 NOTE — PROGRESS NOTES
[address] [place] [address]  [phone]           Patient: Ade Mcdonough                MRN: [id number]       SSN: [id number]  YOB: 1930        AGE: 80 y.o. SEX: male  Body mass index is 27.08 kg/m². PCP: Clara Ray MD  07/12/19      This office note has been dictated. REVIEW OF SYSTEMS:  Constitutional: Negative for fever, chills, weight loss and malaise/fatigue. HENT: Negative. Eyes: Negative. Respiratory: Negative. Cardiovascular: Negative. Gastrointestinal: No bowel incontinence or constipation. Genitourinary: No bladder incontinence or saddle anesthesia. Skin: Negative. Neurological: Negative. Endo/Heme/Allergies: Negative. Psychiatric/Behavioral: Negative. Musculoskeletal: As per HPI above. Past Medical History:   Diagnosis Date    Closed compression fracture of L3 lumbar vertebra 2/5/2018    Chronic aysmptomatic, noted on MRI 2018    Hypertension          Current Outpatient Medications:     naproxen sodium (ALEVE) 220 mg cap, Take 1 Cap by mouth two (2) times a day. Prn pain, Disp: 30 Cap, Rfl: 0    diclofenac (VOLTAREN) 1 % gel, Apply  to affected area four (4) times daily. , Disp: 100 g, Rfl: 4    budesonide-formoterol (SYMBICORT) 80-4.5 mcg/actuation HFAA, INHALE 2 PUFFS INTO MOUTH ONCE DAILY, Disp: , Rfl:     gabapentin (NEURONTIN) 100 mg capsule, Take 1 tab PO BID PRN for nerve pain, Disp: 45 Cap, Rfl: 0    albuterol (PROAIR HFA) 90 mcg/actuation inhaler, Take 2 Puffs by inhalation daily. , Disp: , Rfl:     SYMBICORT 80-4.5 mcg/actuation HFAA inhaler, , Disp: , Rfl:     Cetirizine (ZYRTEC) 10 mg cap, Take  by mouth., Disp: , Rfl:     lisinopril-hydrochlorothiazide (PRINZIDE, ZESTORETIC) 10-12.5 mg per tablet, Take 1 Tab by mouth daily. , Disp: , Rfl:     No Known Allergies    Social History     Socioeconomic History    Marital status:      Spouse name: Not on file    Number of children: Not on file    Years of education: Not on file    Highest education level: Not on file   Occupational History    Not on file   Social Needs    Financial resource strain: Not on file    Food insecurity:     Worry: Not on file     Inability: Not on file    Transportation needs:     Medical: Not on file     Non-medical: Not on file   Tobacco Use    Smoking status: Former Smoker     Last attempt to quit: 1978     Years since quittin.5    Smokeless tobacco: Never Used   Substance and Sexual Activity    Alcohol use: Yes     Alcohol/week: 1.0 oz     Types: 2 Standard drinks or equivalent per week    Drug use: No    Sexual activity: Not on file   Lifestyle    Physical activity:     Days per week: Not on file     Minutes per session: Not on file    Stress: Not on file   Relationships    Social connections:     Talks on phone: Not on file     Gets together: Not on file     Attends Nondenominational service: Not on file     Active member of club or organization: Not on file     Attends meetings of clubs or organizations: Not on file     Relationship status: Not on file    Intimate partner violence:     Fear of current or ex partner: Not on file     Emotionally abused: Not on file     Physically abused: Not on file     Forced sexual activity: Not on file   Other Topics Concern    Not on file   Social History Narrative    Not on file       Past Surgical History:   Procedure Laterality Date    HX CATARACT REMOVAL Left 2004    HX CATARACT REMOVAL Right 2004    HX HERNIA REPAIR  2001    HX PROSTATECTOMY      HX RETINAL DETACHMENT REPAIR  10/2003           We did see . Elzbieta Isabel for followup with regards to his right shoulder and right knee. I gave him a cortisone injection for his right shoulder upon his last visit, which worked pretty well for him. He is doing well with his shoulder. He does report it is a little weak at times.   There is no numbness or tingling in the upper extremity. With regards to the knee, he does have known advanced arthritis of the right knee. He had a cortisone injection, which did not help him much. He has done well with viscosupplementation in the past, and he would like to try this again. There have been no recent fever, chills, systemic changes, or injuries to report. The patient does have decreased walking tolerance, trouble getting up from a chair, and going up and down stairs. He has no mechanical symptomatology of the knee. PHYSICAL EXAMINATION:  In general, the patient is alert and oriented x 3 in no acute distress. The patient is well-developed, well-nourished, with a normal affect. The patient is afebrile. HEENT:  Head is normocephalic and atraumatic. Pupils are equally round and reactive to light and accommodation. Extraocular eye movements are intact. Neck is supple. Trachea is midline. No JVD is present. Breathing is nonlabored. Examination of the right shoulder reveals the skin is intact. There is no ecchymosis, no warmth, and no signs of infection or cellulitis present. Range of motion is about 120ø of forward flexion and 120ø of abduction. Strength in external rotation is slightly decreased on the right. There is a negative Mcgraw' and negative drop-arm, Speed's, and Fergus's. Radial pulse is 2+, and capillary refill is within normal limits. Examination of the lower extremities reveals pain-free range of motion of the hips. There is no pain to palpation of the greater trochanteric bursae. There is negative straight leg raise. There is negative calf tenderness. There is negative Zenia's. There is no evidence of DVT present. The right knee reveals the skin is intact. There is no ecchymosis, no warmth. Findings are consistent with advanced arthritis of the right knee with pain to palpation tricompartmentally and crepitus arising from the anterior compartment. ASSESSMENT:      1.  Right shoulder subacromial bursitis, impingement syndrome, and rotator cuff pathology. 2. Right knee end-staged arthritis. PLAN:  At this point, we are going to move forward with getting him approved for viscosupplementation. It worked well for him in the past, and he has failed cortisone injections. With regards to the shoulder, he is doing pretty well. He understands we can repeat the injection if necessary. He will call with any questions or concerns that shall arise.                   JR Shaun GONZALEZ, PA-C, ATC

## 2019-07-16 ENCOUNTER — OFFICE VISIT (OUTPATIENT)
Dept: UROLOGY | Age: 84
End: 2019-07-16

## 2019-07-16 ENCOUNTER — HOSPITAL ENCOUNTER (OUTPATIENT)
Dept: LAB | Age: 84
Discharge: HOME | End: 2019-07-16
Payer: MEDICARE

## 2019-07-16 VITALS
OXYGEN SATURATION: 95 % | SYSTOLIC BLOOD PRESSURE: 129 MMHG | BODY MASS INDEX: 27.08 KG/M2 | DIASTOLIC BLOOD PRESSURE: 64 MMHG | HEART RATE: 66 BPM | HEIGHT: 69 IN

## 2019-07-16 DIAGNOSIS — C61 PROSTATE CANCER (HCC): Primary | ICD-10-CM

## 2019-07-16 DIAGNOSIS — C61 PROSTATE CANCER (HCC): ICD-10-CM

## 2019-07-16 LAB
BILIRUB UR QL STRIP: NEGATIVE
GLUCOSE UR-MCNC: NEGATIVE MG/DL
KETONES P FAST UR STRIP-MCNC: NEGATIVE MG/DL
PH UR STRIP: 7 [PH] (ref 4.6–8)
PROT UR QL STRIP: NEGATIVE
PSA SERPL-MCNC: 0 NG/ML (ref 0–4)
SP GR UR STRIP: 1.02 (ref 1–1.03)
UA UROBILINOGEN AMB POC: NORMAL (ref 0.2–1)
URINALYSIS CLARITY POC: CLEAR
URINALYSIS COLOR POC: YELLOW
URINE BLOOD POC: NEGATIVE
URINE LEUKOCYTES POC: NEGATIVE
URINE NITRITES POC: NEGATIVE

## 2019-07-16 PROCEDURE — 36415 COLL VENOUS BLD VENIPUNCTURE: CPT

## 2019-07-16 PROCEDURE — 84153 ASSAY OF PSA TOTAL: CPT

## 2019-07-16 NOTE — PROGRESS NOTES
Abel La 80 y.o. male  Prostate Javy Spindle Dr. Perla Halsted [facility] seen today for prostate carcinoma follow-up patient is voiding well with a solid stream good control with mild-  Patient is voiding well and has no complaints regarding urination at this time-mild stress urinary incontinence managed by use of external pad- 1 pad per day     PSA less than 0.1 in July 2015  PSA less than 0.1 in July 2016  PSA less than 0.1 in July 2017  PSA less than 0.1 in July 2018      Review of Systems:    CNS: No seizures syncope headaches or dizziness no visual changes  Respiratory: No shortness of breast or wheezing  Cardiovascular:No chest pain or palpitations  Intestinal:No constipation or dyspepsia  Urinary: No irritative or obstructive voiding symptoms  Skeletal: No bone or joint pain  Endocrine:No diabetes or thyroid disease  Other:          Allergies: No Known Allergies   Medications:    Current Outpatient Medications   Medication Sig Dispense Refill    budesonide-formoterol (SYMBICORT) 80-4.5 mcg/actuation HFAA INHALE 2 PUFFS INTO MOUTH ONCE DAILY      Cetirizine (ZYRTEC) 10 mg cap Take  by mouth.  lisinopril-hydrochlorothiazide (PRINZIDE, ZESTORETIC) 10-12.5 mg per tablet Take 1 Tab by mouth daily.  naproxen sodium (ALEVE) 220 mg cap Take 1 Cap by mouth two (2) times a day. Prn pain 30 Cap 0    diclofenac (VOLTAREN) 1 % gel Apply  to affected area four (4) times daily. 100 g 4    gabapentin (NEURONTIN) 100 mg capsule Take 1 tab PO BID PRN for nerve pain 45 Cap 0    albuterol (PROAIR HFA) 90 mcg/actuation inhaler Take 2 Puffs by inhalation daily.       SYMBICORT 80-4.5 mcg/actuation HFAA inhaler          Past Medical History:   Diagnosis Date    Closed compression fracture of L3 lumbar vertebra 2/5/2018    Chronic aysmptomatic, noted on MRI 2018    Hypertension       Past Surgical History:   Procedure Laterality Date    HX CATARACT REMOVAL Left 09/2004    HX CATARACT REMOVAL Right 2004    HX HERNIA REPAIR  2001    HX PROSTATECTOMY      HX RETINAL DETACHMENT REPAIR  10/2003     Social History     Socioeconomic History    Marital status:      Spouse name: Not on file    Number of children: Not on file    Years of education: Not on file    Highest education level: Not on file   Occupational History    Not on file   Social Needs    Financial resource strain: Not on file    Food insecurity:     Worry: Not on file     Inability: Not on file    Transportation needs:     Medical: Not on file     Non-medical: Not on file   Tobacco Use    Smoking status: Former Smoker     Last attempt to quit: 1978     Years since quittin.5    Smokeless tobacco: Never Used   Substance and Sexual Activity    Alcohol use: Yes     Alcohol/week: 1.0 oz     Types: 2 Standard drinks or equivalent per week    Drug use: No    Sexual activity: Not on file   Lifestyle    Physical activity:     Days per week: Not on file     Minutes per session: Not on file    Stress: Not on file   Relationships    Social connections:     Talks on phone: Not on file     Gets together: Not on file     Attends [employer] service: Not on file     Active member of club or organization: Not on file     Attends meetings of clubs or organizations: Not on file     Relationship status: Not on file    Intimate partner violence:     Fear of current or ex partner: Not on file     Emotionally abused: Not on file     Physically abused: Not on file     Forced sexual activity: Not on file   Other Topics Concern    Not on file   Social History Narrative    Not on file      History reviewed. No pertinent family history.      Physical Examination: Well-nourished trim and fit appearing adult male in no apparent distress    Prostate by SEVERO is nonpalpable no induration no nodularity  No rectal masses induration or tenderness    Urinalysis: Negative dipstick/nitrite negative    Impression: Prostate carcinoma LUIS ALFREDO 22 years status post radical prostatectomy    Plan: PSA today      RTC 1 year PSA SEVERO    More than 1/2 of this 15 minute visit was spent in counselling and coordination of care, as described above. Narvis Brunner, MD  -electronically signed-    PLEASE NOTE:  This document has been produced using voice recognition software. Unrecognized errors in transcription may be present.

## 2019-07-16 NOTE — PROGRESS NOTES
Mr. Hallie Jaime has a reminder for a \"due or due soon\" health maintenance. I have asked that he contact his primary care provider for follow-up on this health maintenance.

## 2019-07-16 NOTE — PATIENT INSTRUCTIONS
Prostate Cancer: Care Instructions Your Care Instructions The prostate gland is a small, walnut-shaped organ that lies just below a man's bladder. It surrounds the urethra, the tube that carries urine from the bladder out of the body through the penis. Prostate cancer is the abnormal growth of cells in the prostate gland. Prostate cancer cells can spread within the prostate, to nearby lymph nodes and other tissues, and to other parts of the body. When the cancer hasn't spread outside the prostate, it is called localized prostate cancer. With localized prostate cancer, your options depend on how likely it is that your cancer will grow. Tests will show if your cancer is likely to grow. · Low-risk cancer isn't likely to grow right away. If your cancer is low-risk, you can choose active surveillance. This means your cancer will be watched closely by your doctor with regular checkups and tests to see if the cancer grows. This choice allows you to delay having surgery or radiation, often for many years. If the cancer grows very slowly, you may never need treatment. · Medium-risk cancer is more likely to grow. Some men with this type of cancer may be able to choose active surveillance. Others may need to choose surgery or radiation. · High-risk cancer is most likely to grow. If you have high-risk cancer, you will likely need to choose surgery or radiation. If your cancer has already spread outside the prostate or to other parts of the body, then you may have other treatments, like chemotherapy or hormone therapy. If you are over age [de-identified] or have other serious health problems, like heart disease, you may choose not to have treatments to cure your cancer. Instead, you can just have treatments to manage your symptoms. This is called watchful waiting. Finding out that you have cancer is scary. You may feel many emotions and may need some help coping.  Seek out family, friends, and counselors for support. You also can do things at home to make yourself feel better while you go through treatment. Call the [facility] (8-935.123.6409) or visit its website at [address] [employer] [place] for more information. Follow-up care is a key part of your treatment and safety. Be sure to make and go to all appointments, and call your doctor if you are having problems. It's also a good idea to know your test results and keep a list of the medicines you take. How can you care for yourself at home? · Your doctor will talk to you about your treatment options. You may need to learn more about each of them before you can decide which treatment is best for you. · Take your medicines exactly as prescribed. Call your doctor if you think you are having a problem with your medicine. You will get more details on the specific medicines your doctor prescribes. · Eat healthy food. If you do not feel like eating, try to eat food that has protein and extra calories to keep up your strength and prevent weight loss. Drink liquid meal replacements for extra calories and protein. Try to eat your main meal early. · Take steps to control your stress and workload. Learn relaxation techniques. ? Share your feelings. Stress and tension affect our emotions. By expressing your feelings to others, you may be able to understand and cope with them. ? Consider joining a support group. Talking about a problem with your spouse, a good friend, or other people with similar problems is a good way to reduce tension and stress. ? Express yourself through art. Try writing, crafts, dance, or art to relieve stress. Some dance, writing, or art groups may be available just for people who have cancer. ? Be kind to your body and mind. Getting enough sleep, eating a healthy diet, and taking time to do things you enjoy can contribute to an overall feeling of balance in your life and can help reduce stress. ? Get help if you need it. Discuss your concerns with your doctor or counselor. · Get some physical activity every day, but do not get too tired. Keep doing the hobbies you enjoy as your energy allows. · If you are vomiting or have diarrhea: ? Drink plenty of fluids (enough so that your urine is light yellow or clear like water) to prevent dehydration. Choose water and other caffeine-free clear liquids. If you have kidney, heart, or liver disease and have to limit fluids, talk with your doctor before you increase the amount of fluids you drink. ? When you are able to eat, try clear soups, mild foods, and liquids until all symptoms are gone for 12 to 48 hours. Jell-O, dry toast, crackers, and cooked cereal are also good choices. · If you have not already done so, prepare a list of advance directives. Advance directives are instructions to your doctor and family members about what kind of care you want if you become unable to speak or express yourself. When should you call for help? Call 911 anytime you think you may need emergency care. For example, call if: 
  · You passed out (lost consciousness).  
 Call your doctor now or seek immediate medical care if: 
  · You have new or worse pain.  
  · You have new symptoms, such as a cough, belly pain, vomiting, diarrhea, or a rash.  
  · You have symptoms of a urinary tract infection. For example: ? You have blood or pus in your urine. ? You have pain in your back just below your rib cage. This is called flank pain. ? You have a fever, chills, or body aches. ? It hurts to urinate. ? You have groin or belly pain.  
 Watch closely for changes in your health, and be sure to contact your doctor if: 
  · You have swollen glands in your armpits, groin, or neck.  
  · You have trouble controlling your urine.  
  · You do not get better as expected. Where can you learn more? Go to http://danny-luciano.info/. Enter V141 in the search box to learn more about \"Prostate Cancer: Care Instructions. \" Current as of: March 27, 2018 Content Version: 11.9 © 5570-9920 [employer]. Care instructions adapted under license by [employer] (which disclaims liability or warranty for this information). If you have questions about a medical condition or this instruction, always ask your healthcare professional. [place] [address] any warranty or liability for your use of this information.

## 2019-08-23 ENCOUNTER — OFFICE VISIT (OUTPATIENT)
Dept: ORTHOPEDIC SURGERY | Age: 84
End: 2019-08-23

## 2019-08-23 VITALS
BODY MASS INDEX: 27.25 KG/M2 | HEART RATE: 73 BPM | OXYGEN SATURATION: 95 % | WEIGHT: 184 LBS | SYSTOLIC BLOOD PRESSURE: 147 MMHG | RESPIRATION RATE: 16 BRPM | DIASTOLIC BLOOD PRESSURE: 79 MMHG | HEIGHT: 69 IN | TEMPERATURE: 97 F

## 2019-08-23 DIAGNOSIS — M17.11 PRIMARY OSTEOARTHRITIS OF RIGHT KNEE: Primary | ICD-10-CM

## 2019-08-23 NOTE — PROGRESS NOTES
1. Have you been to the ER, urgent care clinic since your last visit? Hospitalized since your last visit? NO    2. Have you seen or consulted any other health care providers outside of the [address] [place] [address] since your last visit? Include any pap smears or colon screening.  Yes, Amparo Martin

## 2019-08-23 NOTE — PROGRESS NOTES
Patient: Rosemary Donnelly                MRN: [id number]       SSN: [id number]  YOB: 1930        AGE: 80 y.o. SEX: male  Body mass index is 27.17 kg/m². PCP: Ольга Bright MD  08/23/19        Pt presents today for their 1st orthovisc  injection for Right knee . There has been no recent fevers/chills, systemic changes, or injuries to report. PE:  General A&Ox3, NAD  Exam of the knees reveals skin intact, no erythema, no ecchymosis, no signs for infection. No cyanosis, clubbing, or edema present distally. Neg calf tenderness, neg homans, no signs for DVT present. A: Right knee OA    P: * Patient was identified by name and date of birth       * Agreement on procedure being performed was verified      * Risks and Benefits explained to the patient      * Procedure site verified and marked as necessary      * Patient was positioned for comfort      * Consent was signed and verified     8:53 AM      the Right knee was injected US guided assistance without complications. Patient was instructed on post injection care.        Arminda Harley PA-C

## 2019-08-23 NOTE — PATIENT INSTRUCTIONS
Please follow up in 1 week. You are advised to contact us if your condition worsens. Hyaluronic Acid (By injection)   Hyaluronic Acid (fxk-td-ilb-ON-ate AS-id)  Treats severe pain in your knee due to osteoarthritis. Brand Name(s): Euflexxa, Gel-One, GelSyn-3, GenVisc 850, Hyalgan, Hymovis, Monovisc, Orthovisc, Supartz FX, Visco-3   There may be other brand names for this medicine. When This Medicine Should Not Be Used: This medicine is not right for everyone. You should not receive it if you had an allergic reaction to hyaluronic acid or if you have a bleeding disorder. How to Use This Medicine:   Injectable  · Your doctor will tell you how many injections you will need. This medicine is injected into your knee joint. · A nurse or other health provider will give you this medicine. Drugs and Foods to Avoid:      Ask your doctor or pharmacist before using any other medicine, including over-the-counter medicines, vitamins, and herbal products. Warnings While Using This Medicine:   · Tell your doctor if you are pregnant or breastfeeding, or if you have any allergies, including to birds, feathers, or eggs. · Rest your knee for 48 hours after you receive an injection. Do not do strenuous, weightbearing activities, such as jogging or tennis. Avoid activities that keep you standing for longer than 1 hour. Possible Side Effects While Using This Medicine:   Call your doctor right away if you notice any of these side effects:  · Allergic reaction: Itching or hives, swelling in your face or hands, swelling or tingling in your mouth or throat, chest tightness, trouble breathing  If you notice these less serious side effects, talk with your doctor:   · Mild increase in pain or swelling in your knee  · Pain, redness, or swelling where the medicine is injected  If you notice other side effects that you think are caused by this medicine, tell your doctor. Call your doctor for medical advice about side effects. You may report side effects to FDA at 0-107-FDA-9708  © 2017 [facility] Information is for End User's use only and may not be sold, redistributed or otherwise used for commercial purposes. The above information is an  only. It is not intended as medical advice for individual conditions or treatments. Talk to your doctor, nurse or pharmacist before following any medical regimen to see if it is safe and effective for you. Hyaluronic Acid (By injection)   Hyaluronic Acid (tiu-dv-red-ON-ate AS-id)  Treats severe pain in your knee due to osteoarthritis. Brand Name(s): Euflexxa, Gel-One, GelSyn-3, GenVisc 850, Hyalgan, Hymovis, Monovisc, Orthovisc, Supartz FX, Visco-3   There may be other brand names for this medicine. When This Medicine Should Not Be Used: This medicine is not right for everyone. You should not receive it if you had an allergic reaction to hyaluronic acid or if you have a bleeding disorder. How to Use This Medicine:   Injectable  · Your doctor will tell you how many injections you will need. This medicine is injected into your knee joint. · A nurse or other health provider will give you this medicine. Drugs and Foods to Avoid:      Ask your doctor or pharmacist before using any other medicine, including over-the-counter medicines, vitamins, and herbal products. Warnings While Using This Medicine:   · Tell your doctor if you are pregnant or breastfeeding, or if you have any allergies, including to birds, feathers, or eggs. · Rest your knee for 48 hours after you receive an injection. Do not do strenuous, weightbearing activities, such as jogging or tennis. Avoid activities that keep you standing for longer than 1 hour.   Possible Side Effects While Using This Medicine:   Call your doctor right away if you notice any of these side effects:  · Allergic reaction: Itching or hives, swelling in your face or hands, swelling or tingling in your mouth or throat, chest tightness, trouble breathing  If you notice these less serious side effects, talk with your doctor:   · Mild increase in pain or swelling in your knee  · Pain, redness, or swelling where the medicine is injected  If you notice other side effects that you think are caused by this medicine, tell your doctor. Call your doctor for medical advice about side effects. You may report side effects to FDA at 7-855-BZK-5658  © 2017 [facility] Information is for End User's use only and may not be sold, redistributed or otherwise used for commercial purposes. The above information is an  only. It is not intended as medical advice for individual conditions or treatments. Talk to your doctor, nurse or pharmacist before following any medical regimen to see if it is safe and effective for you.

## 2019-08-30 ENCOUNTER — OFFICE VISIT (OUTPATIENT)
Dept: ORTHOPEDIC SURGERY | Age: 84
End: 2019-08-30

## 2019-08-30 VITALS
TEMPERATURE: 96.1 F | HEART RATE: 81 BPM | OXYGEN SATURATION: 97 % | WEIGHT: 186 LBS | HEIGHT: 69 IN | RESPIRATION RATE: 16 BRPM | DIASTOLIC BLOOD PRESSURE: 66 MMHG | BODY MASS INDEX: 27.55 KG/M2 | SYSTOLIC BLOOD PRESSURE: 133 MMHG

## 2019-08-30 DIAGNOSIS — M25.561 RIGHT KNEE PAIN, UNSPECIFIED CHRONICITY: ICD-10-CM

## 2019-08-30 DIAGNOSIS — M75.51 BURSITIS OF RIGHT SHOULDER: ICD-10-CM

## 2019-08-30 DIAGNOSIS — M19.011 PRIMARY OSTEOARTHRITIS OF RIGHT SHOULDER: ICD-10-CM

## 2019-08-30 DIAGNOSIS — M17.11 PRIMARY OSTEOARTHRITIS OF RIGHT KNEE: Primary | ICD-10-CM

## 2019-08-30 RX ORDER — BETAMETHASONE SODIUM PHOSPHATE AND BETAMETHASONE ACETATE 3; 3 MG/ML; MG/ML
6 INJECTION, SUSPENSION INTRA-ARTICULAR; INTRALESIONAL; INTRAMUSCULAR; SOFT TISSUE ONCE
Qty: 1 ML | Refills: 0
Start: 2019-08-30 | End: 2019-08-30

## 2019-08-30 NOTE — PROGRESS NOTES
1. Have you been to the ER, urgent care clinic since your last visit? Hospitalized since your last visit? NO    2. Have you seen or consulted any other health care providers outside of the [address] [place] [address] since your last visit? Include any pap smears or colon screening.  NO

## 2019-08-30 NOTE — PROGRESS NOTES
Patient: Steph Aguilar                MRN: [id number]       SSN: [id number]  YOB: 1930        AGE: 80 y.o. SEX: male  Body mass index is 27.47 kg/m². PCP: Rebecca Vaz MD  08/30/19        Pt presents today for their 2nd orthovisc  injection for Right knee . There has been no recent fevers/chills, systemic changes, or injuries to report. PE:  General A&Ox3, NAD  Exam of the knees reveals skin intact, no erythema, no ecchymosis, no signs for infection. No cyanosis, clubbing, or edema present distally. Neg calf tenderness, neg homans, no signs for DVT present. A: Right knee OA    P: * Patient was identified by name and date of birth       * Agreement on procedure being performed was verified      * Risks and Benefits explained to the patient      * Procedure site verified and marked as necessary      * Patient was positioned for comfort      * Consent was signed and verified     9:07 AM      the Right knee was injected US guided assistance without complications. Patient was instructed on post injection care.        Didier Denton PA-C

## 2019-08-30 NOTE — PROGRESS NOTES
[address] [place] [address]  [phone]           Patient: Gm Bailon                MRN: [id number]       SSN: [id number]  YOB: 1930        AGE: 80 y.o. SEX: male  Body mass index is 27.47 kg/m². PCP: Helena Whitaker MD  08/30/19      This office note has been dictated. REVIEW OF SYSTEMS:  Constitutional: Negative for fever, chills, weight loss and malaise/fatigue. HENT: Negative. Eyes: Negative. Respiratory: Negative. Cardiovascular: Negative. Gastrointestinal: No bowel incontinence or constipation. Genitourinary: No bladder incontinence or saddle anesthesia. Skin: Negative. Neurological: Negative. Endo/Heme/Allergies: Negative. Psychiatric/Behavioral: Negative. Musculoskeletal: As per HPI above. Past Medical History:   Diagnosis Date    Closed compression fracture of L3 lumbar vertebra 2/5/2018    Chronic aysmptomatic, noted on MRI 2018    Hypertension          Current Outpatient Medications:     naproxen sodium (ALEVE) 220 mg cap, Take 1 Cap by mouth two (2) times a day. Prn pain, Disp: 30 Cap, Rfl: 0    diclofenac (VOLTAREN) 1 % gel, Apply  to affected area four (4) times daily. , Disp: 100 g, Rfl: 4    budesonide-formoterol (SYMBICORT) 80-4.5 mcg/actuation HFAA, INHALE 2 PUFFS INTO MOUTH ONCE DAILY, Disp: , Rfl:     Cetirizine (ZYRTEC) 10 mg cap, Take  by mouth., Disp: , Rfl:     lisinopril-hydrochlorothiazide (PRINZIDE, ZESTORETIC) 10-12.5 mg per tablet, Take 1 Tab by mouth daily. , Disp: , Rfl:     gabapentin (NEURONTIN) 100 mg capsule, Take 1 tab PO BID PRN for nerve pain, Disp: 45 Cap, Rfl: 0    albuterol (PROAIR HFA) 90 mcg/actuation inhaler, Take 2 Puffs by inhalation daily. , Disp: , Rfl:     No Known Allergies    Social History     Socioeconomic History    Marital status:      Spouse name: Not on file    Number of children: Not on file    Years of education: Not on file    Highest education level: Not on file   Occupational History    Not on file   Social Needs    Financial resource strain: Not on file    Food insecurity:     Worry: Not on file     Inability: Not on file    Transportation needs:     Medical: Not on file     Non-medical: Not on file   Tobacco Use    Smoking status: Former Smoker     Last attempt to quit: 1978     Years since quittin.6    Smokeless tobacco: Never Used   Substance and Sexual Activity    Alcohol use: Yes     Alcohol/week: 1.7 standard drinks     Types: 2 Standard drinks or equivalent per week    Drug use: No    Sexual activity: Not on file   Lifestyle    Physical activity:     Days per week: Not on file     Minutes per session: Not on file    Stress: Not on file   Relationships    Social connections:     Talks on phone: Not on file     Gets together: Not on file     Attends Rastafari service: Not on file     Active member of club or organization: Not on file     Attends meetings of clubs or organizations: Not on file     Relationship status: Not on file    Intimate partner violence:     Fear of current or ex partner: Not on file     Emotionally abused: Not on file     Physically abused: Not on file     Forced sexual activity: Not on file   Other Topics Concern    Not on file   Social History Narrative    Not on file       Past Surgical History:   Procedure Laterality Date    HX CATARACT REMOVAL Left 2004    HX CATARACT REMOVAL Right 2004    HX HERNIA REPAIR  2001    HX PROSTATECTOMY      HX RETINAL DETACHMENT REPAIR  10/2003           * Patient was identified by name and date of birth   * Agreement on procedure being performed was verified  * Risks and Benefits explained to the patient  * Procedure site verified and marked as necessary  * Patient was positioned for comfort  * Consent was signed and verified  9:11 AM    The patient was instructed on post injection care.               We did see Mr. Cheryle Kocher today for followup with regards to his right shoulder, as well as his right knee. He returns today for a second Orthovisc injection of the right knee. The knee is a little better. With regards to his shoulder, he is having discomfort. He has had injection in the past, which worked pretty well for him. He is requesting repeat injection. He does have trouble with overhead activities and some occasional night discomfort. There is no radiating pain or numbness in the upper extremity. PHYSICAL EXAMINATION:  In general, the patient is alert and oriented x 3 in no acute distress. The patient is well-developed, well-nourished, with a normal affect. The patient is afebrile. HEENT:  Head is normocephalic and atraumatic. Pupils are equally round and reactive to light and accommodation. Extraocular eye movements are intact. Neck is supple. Trachea is midline. No JVD is present. Breathing is nonlabored. Examination of the neck reveals good range of motion of the cervical spine without reproduction of symptoms. There is a negative Spurling's. The right shoulder skin is intact. There is no ecchymosis, no warmth, and no signs of infection or cellulitis present. He does have a positive Mcgraw', positive Neer's, and negative drop-arm, Speed's, and Barnstable's. He does have slight decreased strength with external rotation on the right side. He gets about 90ø forward flexion, 90ø abduction. Radial pulse is 2+. Capillary refill is within normal limits. Examination of the right knee reveals the skin is intact. There is no ecchymosis, no warmth, and no signs of infection or cellulitis present. Findings are consistent with advanced arthritis of the right knee with pain to palpation tricompartmentally and crepitus arising from the anterior compartment. ASSESSMENT:      1. Right shoulder subacromial bursitis, impingement syndrome, and rotator cuff pathology. 2. Right knee osteoarthritis. PLAN:  At this point, we are going to move forward with a cortisone injection for the right shoulder. After informed and written consent with an appropriate time out performed, and under sterile conditions, with ultrasound-guided assistance, the right shoulder was prepped with Betadine and 6 mg of Celestone was injected to the subacromial space without complications. The patient tolerated the injection well. The patient was instructed on post injection care. We also moved forward with a second Orthovisc if to the right knee. Today, after informed consent was obtained and under sterile conditions, with ultrasound-guided assistance, the right knee was prepped with Betadine and an Orthovisc injection was placed without complications. The patient tolerated the injection well. We will see him back next week for his third Orthovisc injection.                     JR Shaun GONZALEZ, TY, ATC

## 2019-09-06 ENCOUNTER — OFFICE VISIT (OUTPATIENT)
Dept: ORTHOPEDIC SURGERY | Age: 84
End: 2019-09-06

## 2019-09-06 VITALS
BODY MASS INDEX: 27.4 KG/M2 | DIASTOLIC BLOOD PRESSURE: 62 MMHG | WEIGHT: 185 LBS | SYSTOLIC BLOOD PRESSURE: 139 MMHG | HEART RATE: 74 BPM | TEMPERATURE: 95.7 F | HEIGHT: 69 IN | RESPIRATION RATE: 16 BRPM | OXYGEN SATURATION: 96 %

## 2019-09-06 DIAGNOSIS — M25.561 RIGHT KNEE PAIN, UNSPECIFIED CHRONICITY: ICD-10-CM

## 2019-09-06 DIAGNOSIS — M17.11 PRIMARY OSTEOARTHRITIS OF RIGHT KNEE: Primary | ICD-10-CM

## 2019-09-06 NOTE — PROGRESS NOTES
1. Have you been to the ER, urgent care clinic since your last visit? Hospitalized since your last visit? NO    2. Have you seen or consulted any other health care providers outside of the [address] [place] [address] since your last visit? Include any pap smears or colon screening.  NO

## 2019-09-06 NOTE — PROGRESS NOTES
Patient: Charmaine Poon                MRN: [id number]       SSN: [id number]  YOB: 1930        AGE: 80 y.o. SEX: male  Body mass index is 27.32 kg/m². PCP: Keron James MD  09/06/19        Pt presents today for their 3rd orthovisc  injection for Right knee . There has been no recent fevers/chills, systemic changes, or injuries to report. PE:  General A&Ox3, NAD  Exam of the knees reveals skin intact, no erythema, no ecchymosis, no signs for infection. No cyanosis, clubbing, or edema present distally. Neg calf tenderness, neg homans, no signs for DVT present. A: Right knee OA    P: * Patient was identified by name and date of birth       * Agreement on procedure being performed was verified      * Risks and Benefits explained to the patient      * Procedure site verified and marked as necessary      * Patient was positioned for comfort      * Consent was signed and verified     9:30 AM      the Right knee was injected US guided assistance without complications. Patient was instructed on post injection care.        Chuck Murillo PA-C

## 2019-09-13 ENCOUNTER — OFFICE VISIT (OUTPATIENT)
Dept: ORTHOPEDIC SURGERY | Age: 84
End: 2019-09-13

## 2019-09-13 VITALS
HEART RATE: 80 BPM | WEIGHT: 185 LBS | TEMPERATURE: 97.1 F | HEIGHT: 69 IN | DIASTOLIC BLOOD PRESSURE: 60 MMHG | SYSTOLIC BLOOD PRESSURE: 122 MMHG | BODY MASS INDEX: 27.4 KG/M2 | OXYGEN SATURATION: 94 %

## 2019-09-13 DIAGNOSIS — M25.561 RIGHT KNEE PAIN, UNSPECIFIED CHRONICITY: ICD-10-CM

## 2019-09-13 DIAGNOSIS — M17.11 PRIMARY OSTEOARTHRITIS OF RIGHT KNEE: Primary | ICD-10-CM

## 2019-09-13 NOTE — PROGRESS NOTES
Patient: Mendoza Hernández                MRN: [id number]       SSN: [id number]  YOB: 1930        AGE: 80 y.o. SEX: male  Body mass index is 27.32 kg/m². PCP: Dena Martini MD  09/13/19        Pt presents today for their 4th orthovisc  injection for Right knee . There has been no recent fevers/chills, systemic changes, or injuries to report. PE:  General A&Ox3, NAD  Exam of the knees reveals skin intact, no erythema, no ecchymosis, no signs for infection. No cyanosis, clubbing, or edema present distally. Neg calf tenderness, neg homans, no signs for DVT present. A: Right knee OA    P: * Patient was identified by name and date of birth       * Agreement on procedure being performed was verified      * Risks and Benefits explained to the patient      * Procedure site verified and marked as necessary      * Patient was positioned for comfort      * Consent was signed and verified     9:34 AM      the Right knee was injected US guided assistance without complications. Patient was instructed on post injection care.        Janet Hernandez PA-C

## 2019-09-17 ENCOUNTER — OFFICE VISIT (OUTPATIENT)
Dept: ORTHOPEDIC SURGERY | Facility: CLINIC | Age: 84
End: 2019-09-17

## 2019-09-17 VITALS
TEMPERATURE: 95.5 F | DIASTOLIC BLOOD PRESSURE: 59 MMHG | BODY MASS INDEX: 27.25 KG/M2 | RESPIRATION RATE: 16 BRPM | HEIGHT: 69 IN | HEART RATE: 93 BPM | SYSTOLIC BLOOD PRESSURE: 111 MMHG | OXYGEN SATURATION: 99 % | WEIGHT: 184 LBS

## 2019-09-17 DIAGNOSIS — M54.42 ACUTE LEFT-SIDED LOW BACK PAIN WITH LEFT-SIDED SCIATICA: Primary | ICD-10-CM

## 2019-09-17 RX ORDER — METHYLPREDNISOLONE 4 MG/1
TABLET ORAL
Qty: 1 DOSE PACK | Refills: 0 | Status: SHIPPED | OUTPATIENT
Start: 2019-09-17 | End: 2022-08-03

## 2019-09-17 NOTE — PROGRESS NOTES
Kindra Silver  10/8/1930   Chief Complaint   Patient presents with    Hip Pain     left        HISTORY OF PRESENT ILLNESS  Kindra Silver is a 80 y.o. male who presents today for evaluation of left hip pain. He started having discomfort last week. The pain is located left buttock area and radiates down to the knee. The pain is worse with walking and stairs. The pain is a dull achy pain. He reports taking a pain pill he had from a year ago but this did not help. He denies numbness, tingling, burning, groin pain, bowel or bladder changes or saddle paraesthesias. The symptoms are similar to his right sided low back pain he had years ago. He sees Dr. Lazara Duggan for this and has had a MRI L spine and left hip xrays. Patient denies any fever, chills, chest pain, shortness of breath or calf pain. There are no new illness or injuries to report since last seen in the office. No changes in medications, allergies, social or family history. PHYSICAL EXAM:   Visit Vitals  /59 (BP 1 Location: Left arm, BP Patient Position: Sitting)   Pulse 93   Temp 95.5 °F (35.3 °C) (Oral)   Resp 16   Ht 5' 9\" (1.753 m)   Wt 184 lb (83.5 kg)   SpO2 99%   BMI 27.17 kg/m²     The patient is a well-developed, well-nourished male   in no acute distress. The patient is alert and oriented times three. Mood and affect are normal.  LYMPHATIC: lymph nodes are not enlarged and are within normal limits  SKIN: normal in color and non tender to palpation. There are no bruises or abrasions noted. NEUROLOGICAL: Motor sensory exam is within normal limits. Reflexes are equal bilaterally.  There is normal sensation to pinprick and light touch  MUSCULOSKELETAL:  Examination Lumbar   Skin Intact   Tenderness, Paraspinal muscles _   Paraspinal muscle spasms -   Flexion Fingertips to knees   Extension 0   Knee reflexes Normal   Ankle reflexes Normal   Straight leg raise -   Calf tenderness -     Left hip rotates without pain, neg ttp trochanteric bursa     IMPRESSION:      ICD-10-CM ICD-9-CM    1. Acute left-sided low back pain with left-sided sciatica M54.42 724.2      724.3         PLAN:   Pt with left hip pain  I think his pain is coming from his back. I have recommended an OTC NSAID and steroid pack to help with the discomfort. I have also recommended he follow up with Dr. Kay Lopes should the pain persist or not improve.   RTC PRN      TY [facility]

## 2019-10-25 ENCOUNTER — OFFICE VISIT (OUTPATIENT)
Dept: ORTHOPEDIC SURGERY | Age: 84
End: 2019-10-25

## 2019-10-25 VITALS
RESPIRATION RATE: 14 BRPM | HEART RATE: 90 BPM | SYSTOLIC BLOOD PRESSURE: 153 MMHG | BODY MASS INDEX: 27.25 KG/M2 | DIASTOLIC BLOOD PRESSURE: 78 MMHG | WEIGHT: 184 LBS | OXYGEN SATURATION: 95 % | HEIGHT: 69 IN | TEMPERATURE: 96.6 F

## 2019-10-25 DIAGNOSIS — M17.11 PRIMARY OSTEOARTHRITIS OF RIGHT KNEE: Primary | ICD-10-CM

## 2019-10-25 DIAGNOSIS — M25.561 RIGHT KNEE PAIN, UNSPECIFIED CHRONICITY: ICD-10-CM

## 2019-10-25 RX ORDER — BETAMETHASONE SODIUM PHOSPHATE AND BETAMETHASONE ACETATE 3; 3 MG/ML; MG/ML
6 INJECTION, SUSPENSION INTRA-ARTICULAR; INTRALESIONAL; INTRAMUSCULAR; SOFT TISSUE ONCE
Qty: 1 ML | Refills: 0
Start: 2019-10-25 | End: 2019-10-25

## 2019-10-25 NOTE — PROGRESS NOTES
Verbal order with read back given by Fabien Posadas PA-C  to draw up 1mL (6mg) of Celestone and 3mL of 1% Lidocaine

## 2019-10-25 NOTE — PROGRESS NOTES
1. Have you been to the ER, urgent care clinic since your last visit? Hospitalized since your last visit? No    2. Have you seen or consulted any other health care providers outside of the [address] [place] [address] since your last visit? Include any pap smears or colon screening.  No

## 2019-10-25 NOTE — PROGRESS NOTES
[address] [place] [address]  [phone]           Patient: Niles Wayne                MRN: [id number]       SSN: [id number]  YOB: 1930        AGE: 80 y.o. SEX: male  Body mass index is 27.17 kg/m². PCP: Agatha Sinclair MD  10/25/19      This office note has been dictated. REVIEW OF SYSTEMS:  Constitutional: Negative for fever, chills, weight loss and malaise/fatigue. HENT: Negative. Eyes: Negative. Respiratory: Negative. Cardiovascular: Negative. Gastrointestinal: No bowel incontinence or constipation. Genitourinary: No bladder incontinence or saddle anesthesia. Skin: Negative. Neurological: Negative. Endo/Heme/Allergies: Negative. Psychiatric/Behavioral: Negative. Musculoskeletal: As per HPI above. Past Medical History:   Diagnosis Date    Closed compression fracture of L3 lumbar vertebra 2/5/2018    Chronic aysmptomatic, noted on MRI 2018    Hypertension          Current Outpatient Medications:     methylPREDNISolone (MEDROL, SILVIA,) 4 mg tablet, Take as directed, Disp: 1 Dose Pack, Rfl: 0    naproxen sodium (ALEVE) 220 mg cap, Take 1 Cap by mouth two (2) times a day. Prn pain, Disp: 30 Cap, Rfl: 0    diclofenac (VOLTAREN) 1 % gel, Apply  to affected area four (4) times daily. , Disp: 100 g, Rfl: 4    budesonide-formoterol (SYMBICORT) 80-4.5 mcg/actuation HFAA, INHALE 2 PUFFS INTO MOUTH ONCE DAILY, Disp: , Rfl:     gabapentin (NEURONTIN) 100 mg capsule, Take 1 tab PO BID PRN for nerve pain, Disp: 45 Cap, Rfl: 0    albuterol (PROAIR HFA) 90 mcg/actuation inhaler, Take 2 Puffs by inhalation daily. , Disp: , Rfl:     Cetirizine (ZYRTEC) 10 mg cap, Take  by mouth., Disp: , Rfl:     lisinopril-hydrochlorothiazide (PRINZIDE, ZESTORETIC) 10-12.5 mg per tablet, Take 1 Tab by mouth daily. , Disp: , Rfl:     No Known Allergies    Social History     Socioeconomic History    Marital status:      Spouse name: Not on file    Number of children: Not on file    Years of education: Not on file    Highest education level: Not on file   Occupational History    Not on file   Social Needs    Financial resource strain: Not on file    Food insecurity:     Worry: Not on file     Inability: Not on file    Transportation needs:     Medical: Not on file     Non-medical: Not on file   Tobacco Use    Smoking status: Former Smoker     Last attempt to quit: 1978     Years since quittin.8    Smokeless tobacco: Never Used   Substance and Sexual Activity    Alcohol use:  Yes     Alcohol/week: 1.7 standard drinks     Types: 2 Standard drinks or equivalent per week    Drug use: No    Sexual activity: Not on file   Lifestyle    Physical activity:     Days per week: Not on file     Minutes per session: Not on file    Stress: Not on file   Relationships    Social connections:     Talks on phone: Not on file     Gets together: Not on file     Attends Buddhism service: Not on file     Active member of club or organization: Not on file     Attends meetings of clubs or organizations: Not on file     Relationship status: Not on file    Intimate partner violence:     Fear of current or ex partner: Not on file     Emotionally abused: Not on file     Physically abused: Not on file     Forced sexual activity: Not on file   Other Topics Concern    Not on file   Social History Narrative    Not on file       Past Surgical History:   Procedure Laterality Date    HX CATARACT REMOVAL Left 2004    HX CATARACT REMOVAL Right 2004    HX HERNIA REPAIR  2001    HX PROSTATECTOMY      HX RETINAL DETACHMENT REPAIR  10/2003           * Patient was identified by name and date of birth   * Agreement on procedure being performed was verified  * Risks and Benefits explained to the patient  * Procedure site verified and marked as necessary  * Patient was positioned for comfort  * Consent was signed and verified  9:07 AM    The patient was instructed on post injection care. We did see Mr. Lela Dubose for followup with regards to his right knee. The patient has advanced arthritis of the right knee. The patient had a series of viscosupplementation, which helped him mildly. He is still having discomfort in the knee, mainly on the inside part of the knee. 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 The knee has been a little bit more aggravated due to this. He has had no recent fevers, chills, systemic changes, or injuries to report, and no chest pain or shortness of breath. PHYSICAL EXAMINATION:  In general, the patient is alert and oriented x 3 in no acute distress. The patient is well-developed, well-nourished, with a normal affect. The patient is afebrile. HEENT:  Head is normocephalic and atraumatic. Pupils are equally round and reactive to light and accommodation. Extraocular eye movements are intact. Neck is supple. Trachea is midline. No JVD is present. Breathing is nonlabored. Examination of the lower extremities reveals pain-free range of motion of the hips. There is no pain to palpation of the greater trochanteric bursae. There is negative straight leg raise. There is negative calf tenderness. There is negative Zenia's. There is no evidence of DVT present. The right knee reveals the skin is intact. There is no ecchymosis, no warmth, and no signs of infection or cellulitis present. There is pain to palpation tricompartmentally about the knee and crepitus arising from the anterior compartment. Findings are consistent with end-staged arthritis of the knee. RADIOGRAPHS:  Review of previous radiographs does confirm end-staged arthritis with bone-to-bone eburnation of the right knee. ASSESSMENT:  Right knee end-stage osteoarthritis. PLAN:  At this point, we discussed treatment options.   We are going to try another cortisone injection to the knee to see if we can calm it down for him. After informed and written consent with an appropriate time out performed, and under sterile conditions, with ultrasound-guided assistance, the right knee was prepped with Betadine and 6 mg of Celestone was injected without complications. The patient tolerated the injection well. The patient was instructed on post injection care. We will see him back in the office in about three months' time for evaluation. He will call with any questions or concerns that shall arise.                      JR Shaun GONZALEZ, PAYaniC, ATC

## 2020-01-22 ENCOUNTER — OFFICE VISIT (OUTPATIENT)
Dept: ORTHOPEDIC SURGERY | Facility: CLINIC | Age: 85
End: 2020-01-22

## 2020-01-22 VITALS
RESPIRATION RATE: 12 BRPM | HEIGHT: 69 IN | OXYGEN SATURATION: 96 % | DIASTOLIC BLOOD PRESSURE: 75 MMHG | BODY MASS INDEX: 27.25 KG/M2 | WEIGHT: 184 LBS | SYSTOLIC BLOOD PRESSURE: 157 MMHG | TEMPERATURE: 96 F | HEART RATE: 71 BPM

## 2020-01-22 DIAGNOSIS — M25.511 RIGHT SHOULDER PAIN, UNSPECIFIED CHRONICITY: ICD-10-CM

## 2020-01-22 DIAGNOSIS — M17.11 PRIMARY OSTEOARTHRITIS OF RIGHT KNEE: Primary | ICD-10-CM

## 2020-01-22 DIAGNOSIS — M75.51 BURSITIS OF RIGHT SHOULDER: ICD-10-CM

## 2020-01-22 DIAGNOSIS — M25.561 RIGHT KNEE PAIN, UNSPECIFIED CHRONICITY: ICD-10-CM

## 2020-01-22 RX ORDER — BETAMETHASONE SODIUM PHOSPHATE AND BETAMETHASONE ACETATE 3; 3 MG/ML; MG/ML
6 INJECTION, SUSPENSION INTRA-ARTICULAR; INTRALESIONAL; INTRAMUSCULAR; SOFT TISSUE ONCE
Qty: 1 ML | Refills: 0
Start: 2020-01-22 | End: 2020-01-22

## 2020-01-22 RX ORDER — DICLOFENAC SODIUM 10 MG/G
4 GEL TOPICAL 4 TIMES DAILY
Qty: 100 G | Refills: 4 | Status: SHIPPED | OUTPATIENT
Start: 2020-01-22 | End: 2020-02-12 | Stop reason: SDUPTHER

## 2020-01-22 NOTE — PROGRESS NOTES
[address] [place] [address]  [phone]           Patient: Hilaria Kawasaki                MRN: [id number]       SSN: [id number]  YOB: 1930        AGE: 80 y.o. SEX: male  Body mass index is 27.17 kg/m². PCP: Jose Montes MD  01/22/20      This office note has been dictated. REVIEW OF SYSTEMS:  Constitutional: Negative for fever, chills, weight loss and malaise/fatigue. HENT: Negative. Eyes: Negative. Respiratory: Negative. Cardiovascular: Negative. Gastrointestinal: No bowel incontinence or constipation. Genitourinary: No bladder incontinence or saddle anesthesia. Skin: Negative. Neurological: Negative. Endo/Heme/Allergies: Negative. Psychiatric/Behavioral: Negative. Musculoskeletal: As per HPI above. Past Medical History:   Diagnosis Date    Closed compression fracture of L3 lumbar vertebra 2/5/2018    Chronic aysmptomatic, noted on MRI 2018    Hypertension          Current Outpatient Medications:     betamethasone (CELESTONE SOLUSPAN) 6 mg/mL injection, 1 mL by Intra artICUlar route once for 1 dose., Disp: 1 mL, Rfl: 0    betamethasone (CELESTONE SOLUSPAN) 6 mg/mL injection, 1 mL by Intra artICUlar route once for 1 dose., Disp: 1 mL, Rfl: 0    diclofenac (VOLTAREN) 1 % gel, Apply 4 g to affected area four (4) times daily. , Disp: 100 g, Rfl: 4    methylPREDNISolone (MEDROL, SILVIA,) 4 mg tablet, Take as directed, Disp: 1 Dose Pack, Rfl: 0    naproxen sodium (ALEVE) 220 mg cap, Take 1 Cap by mouth two (2) times a day. Prn pain, Disp: 30 Cap, Rfl: 0    diclofenac (VOLTAREN) 1 % gel, Apply  to affected area four (4) times daily. , Disp: 100 g, Rfl: 4    budesonide-formoterol (SYMBICORT) 80-4.5 mcg/actuation HFAA, INHALE 2 PUFFS INTO MOUTH ONCE DAILY, Disp: , Rfl:     gabapentin (NEURONTIN) 100 mg capsule, Take 1 tab PO BID PRN for nerve pain, Disp: 45 Cap, Rfl: 0    albuterol (PROAIR HFA) 90 mcg/actuation inhaler, Take 2 Puffs by inhalation daily. , Disp: , Rfl:     Cetirizine (ZYRTEC) 10 mg cap, Take  by mouth., Disp: , Rfl:     lisinopril-hydrochlorothiazide (PRINZIDE, ZESTORETIC) 10-12.5 mg per tablet, Take 1 Tab by mouth daily. , Disp: , Rfl:     No Known Allergies    Social History     Socioeconomic History    Marital status:      Spouse name: Not on file    Number of children: Not on file    Years of education: Not on file    Highest education level: Not on file   Occupational History    Not on file   Social Needs    Financial resource strain: Not on file    Food insecurity:     Worry: Not on file     Inability: Not on file    Transportation needs:     Medical: Not on file     Non-medical: Not on file   Tobacco Use    Smoking status: Former Smoker     Last attempt to quit: 1978     Years since quittin.0    Smokeless tobacco: Never Used   Substance and Sexual Activity    Alcohol use:  Yes     Alcohol/week: 1.7 standard drinks     Types: 2 Standard drinks or equivalent per week    Drug use: No    Sexual activity: Not on file   Lifestyle    Physical activity:     Days per week: Not on file     Minutes per session: Not on file    Stress: Not on file   Relationships    Social connections:     Talks on phone: Not on file     Gets together: Not on file     Attends Scientologist service: Not on file     Active member of club or organization: Not on file     Attends meetings of clubs or organizations: Not on file     Relationship status: Not on file    Intimate partner violence:     Fear of current or ex partner: Not on file     Emotionally abused: Not on file     Physically abused: Not on file     Forced sexual activity: Not on file   Other Topics Concern    Not on file   Social History Narrative    Not on file       Past Surgical History:   Procedure Laterality Date    HX CATARACT REMOVAL Left 2004    HX CATARACT REMOVAL Right 2004    [address] 11/2001    HX PROSTATECTOMY      HX RETINAL DETACHMENT REPAIR  10/2003           * Patient was identified by name and date of birth   * Agreement on procedure being performed was verified  * Risks and Benefits explained to the patient  * Procedure site verified and marked as necessary  * Patient was positioned for comfort  * Consent was signed and verified  11:12 AM    The patient was instructed on post injection care. We did see Mr. Karin Ansari today for followup with regards to his right shoulder and right knee. I have given him injections for these areas in the past.  He does have rotator cuff pathology and arthritis of the shoulder. He does have known advanced arthritis of the right knee. The injection in the shoulder worked quite well for him. The knee gives him some relief. He does state that this past week, he has noticed a little bit of increased discomfort of the shoulder with a little bit of bruising. He was in the garage doing some activities and felt a little pop in his shoulder. He still has been able to move it, and the pain has settled down. He has had no fever, chills, or night sweats. With regards to the knee, he does have decreased walking tolerance, trouble getting up from a chair and going up and down stairs, and some night pain associated to this. PHYSICAL EXAMINATION:  In general, the patient is alert and oriented x 3 in no acute distress. The patient is well-developed, well-nourished, with a normal affect. The patient is afebrile. HEENT:  Head is normocephalic and atraumatic. Pupils are equally round and reactive to light and accommodation. Extraocular eye movements are intact. Neck is supple. Trachea is midline. No JVD is present. Breathing is nonlabored. Examination of the right shoulder reveals the skin is intact. There is no erythema. He does have a little bit of resolving ecchymosis to the deltoid area. No erythema. There is negative joint effusion. There are no signs for infection or septic joint. Actively, he can do about 90ø of forward flexion and 90ø of abduction, and 20ø of external rotation. He has decreased strength with external rotation a positive Mcgraw', positive Neer's, and negative drop-arm, Speed's, and Gilpin's. radial pulse is 2+. There are no obvious defects noted to the biceps tendon itself. Examination of the lower extremities reveals pain-free range of motion of the hips. There is no pain to palpation of the greater trochanteric bursae. There is negative straight leg raise. There is negative calf tenderness. There is negative Zenia's. There is no evidence of DVT present. The right knee reveals the skin is intact. There is no ecchymosis and no warmth. He has a negative joint effusion, negative patellar ballottement, and no signs for infection. There is pain to palpation tricompartmentally about the knee and crepitus arising from the anterior compartment. Findings are consistent with advanced arthritis of the right knee. RADIOGRAPHS:  Radiographs obtained in the office today, January 22, 2020, at the [address] show no acute bony abnormalities to the right shoulder. 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 Osteopenia is noted. ASSESSMENT:      1. Right shoulder subacromial, impingement syndrome, and rotator cuff pathology. 2. Right knee advanced osteoarthritis. PLAN:  At this point, we discussed treatment options. We are going to move forward with a cortisone injection for the right shoulder and right knee today. After informed and written consent with an appropriate time out performed, and under sterile conditions, with ultrasound-guided assistance, both the right shoulder and right knee were prepped with Betadine and 6 mg of Celestone was injected to each area without complications. The patient tolerated the injections well. The patient was instructed on post injection care. We will see him back in the office in about three months' time for evaluation. He will call with any questions or concerns that shall arise.                       JR Shaun GONZALEZ, PAYaniC, ATC

## 2020-01-22 NOTE — PROGRESS NOTES
1. Have you been to the ER, urgent care clinic since your last visit? Hospitalized since your last visit? No    2. Have you seen or consulted any other health care providers outside of the [address] [place] [address] since your last visit? Include any pap smears or colon screening.  No

## 2020-02-11 ENCOUNTER — TELEPHONE (OUTPATIENT)
Dept: ORTHOPEDIC SURGERY | Age: 85
End: 2020-02-11

## 2020-02-11 NOTE — TELEPHONE ENCOUNTER
Spoke with patient, he was confused stating that he had his MRI back in 2018. Patient believes that something is really wrong. Added patient onto Dr. Valeria Garcia schedule for tomorrow. No further actions needed at this time.

## 2020-02-11 NOTE — TELEPHONE ENCOUNTER
I do not see MRI in system. Please see where this was done and ask Dr. Samra Pritchett about adding him on.

## 2020-02-11 NOTE — TELEPHONE ENCOUNTER
Patient called for Kelechi Perez. Patient is asking if Kelechi Perez could work him in to be seen if possible this week. Patient believes he has re injured his back. Patient would like a call back at tel. [phone]. Note : patient last seen on 02/05/2018 for  Degeneration of intervertebral disc of lumbar region. Dr. Donna Varma first available is on March 11 th. Patient said he had an Mri done.

## 2020-02-12 ENCOUNTER — OFFICE VISIT (OUTPATIENT)
Dept: ORTHOPEDIC SURGERY | Age: 85
End: 2020-02-12

## 2020-02-12 VITALS
TEMPERATURE: 97.8 F | WEIGHT: 188 LBS | SYSTOLIC BLOOD PRESSURE: 154 MMHG | BODY MASS INDEX: 27.85 KG/M2 | OXYGEN SATURATION: 97 % | RESPIRATION RATE: 16 BRPM | HEART RATE: 79 BPM | HEIGHT: 69 IN | DIASTOLIC BLOOD PRESSURE: 88 MMHG

## 2020-02-12 DIAGNOSIS — M54.31 RIGHT SIDED SCIATICA: ICD-10-CM

## 2020-02-12 DIAGNOSIS — M54.50 NONSPECIFIC PAIN IN THE LUMBAR REGION: ICD-10-CM

## 2020-02-12 DIAGNOSIS — M48.062 SPINAL STENOSIS OF LUMBAR REGION WITH NEUROGENIC CLAUDICATION: Primary | ICD-10-CM

## 2020-02-12 RX ORDER — BETAMETHASONE SODIUM PHOSPHATE AND BETAMETHASONE ACETATE 3; 3 MG/ML; MG/ML
12 INJECTION, SUSPENSION INTRA-ARTICULAR; INTRALESIONAL; INTRAMUSCULAR; SOFT TISSUE ONCE
Qty: 2 ML | Refills: 0
Start: 2020-02-12 | End: 2020-02-12

## 2020-02-12 RX ORDER — BUPIVACAINE HYDROCHLORIDE 2.5 MG/ML
0.5 INJECTION, SOLUTION INFILTRATION; PERINEURAL ONCE
Qty: 0.5 ML | Refills: 0
Start: 2020-02-12 | End: 2020-02-12

## 2020-02-12 RX ORDER — LIDOCAINE HYDROCHLORIDE 20 MG/ML
0.5 INJECTION, SOLUTION EPIDURAL; INFILTRATION; INTRACAUDAL; PERINEURAL ONCE
Qty: 0.5 ML | Refills: 0
Start: 2020-02-12 | End: 2020-02-12

## 2020-02-12 NOTE — PROGRESS NOTES
Patient denies any symptoms, reactions, or allergies that would exclude them from receiving a Right Iliolumbar injection today given by Roberta Vivar MD. 2mL Celestone, 0.5mL Marcaine, 0.5mL Lidocaine. Risks and adverse reactions were discussed, consent obtained, and the VIS was given to them. All questions were addressed. She was observed for 10 min post injection. There were no reactions observed.     Kasia Che

## 2020-02-12 NOTE — PROGRESS NOTES
[address]  [address], [address]  [place], [facility] [address]  Phone: [phone]  Fax: [phone]        Natty Jason  : 10/8/1930  PCP: Georges Lincoln MD    PROGRESS NOTE      ASSESSMENT AND PLAN    Diagnoses and all orders for this visit:    1. Spinal stenosis of lumbar region with neurogenic claudication  -     bupivacaine (MARCAINE) 0.25 % (2.5 mg/mL) soln injection; 0.5 mL by IntraMUSCular route once for 1 dose. -     INJECTION, BUPIVICAINE HYDRO  -     LIDOCAINE INJECTION  -     lidocaine, PF, (XYLOCAINE) 20 mg/mL (2 %) injection; 0.5 mL by Other route once for 1 dose. -     betamethasone (CELESTONE SOLUSPAN) 6 mg/mL injection; 2 mL by IntraMUSCular route once for 1 dose.  -     BETAMETHASONE ACETATE & SODIUM PHOSPHATE INJECTION 3 MG EA.  -     NH INJECT TRIGGER POINT, 1 OR 2    2. Right sided sciatica    3. Nonspecific pain in the lumbar region  -     AMB POC XRAY, SPINE, LUMBOSACRAL; 4+       1. Advised to continue HEP. 2. Don't wear wallet in R back pocket while sciatica acts up  3. R iliolumbar TPI  4. Given information on TPI, neuropathic pain, sciatic exercises    F/U PRN      HISTORY OF PRESENT ILLNESS  Akshat Lam is a 80 y.o. male. Pt last evaluated  for stenosis, was given Gabapentin PRN at that time. Pt had cortisone injections in R knee and shoulder 2020 by JAY Goodman. Pt reports Euflexxa injections and cortisone are no longer beneficial for his knee. Pt states Saturday he changed the circulator at [employer], standing. Denies bending often. He states this past  after [facility] while dressing he began to have RLE pain buttock to foot. Took 1 Gabapentin. He affirms it is improving some. Notes relief with laying on R side and good sleep in this position. He states he has troubles with his R hand trying to tie his tie. Admits to popping R shoulder previously. He admits having cane from [employer] due to recent pain.      Location of pain: low back minimal  Does pain radiate into extremities: RLE shooting. R knee pain  Does patient have weakness: trouble with buttons  Pt denies saddle paresthesias. Medications pt is on: Ice no benefit. Heat with benefit. Gabapentin 100mg PRN - 1 tab Sunday no benefit. Denies persistent fevers, chills, weight changes, neurogenic bowel or bladder symptoms. Pt denies recent ED visits or hospitalizations. Pt denies any recent fevers, chills, antibiotics, recent cortisone injections, or infections. Treatments patient has tried:  Physical therapy:No  Doing HEP: Unknown. Remains active. Non-opioid medications: Yes  Spinal injections: No  Spinal surgery- No.   Last L MRI 2018: mod to severe stenosis L2-3-4-5     reviewed. PMHx of hernia repair, HTN. Pain Assessment  2/12/2020   Location of Pain Leg   Location Modifiers Right   Severity of Pain 7   Quality of Pain Other (Comment)   Quality of Pain Comment Noted, pt has difficulty explaining the quality of pain, but states he does experience weakness   Duration of Pain Persistent   Frequency of Pain Constant   Aggravating Factors Standing   Aggravating Factors Comment -   Limiting Behavior Some   Relieving Factors Rest   Relieving Factors Comment laying down on right side   Result of Injury No       PAST MEDICAL HISTORY   Past Medical History:   Diagnosis Date    Closed compression fracture of L3 lumbar vertebra 2/5/2018    Chronic aysmptomatic, noted on MRI 2018    Hypertension        Past Surgical History:   Procedure Laterality Date    HX CATARACT REMOVAL Left 09/2004    HX CATARACT REMOVAL Right 11/2004    HX HERNIA REPAIR  11/2001    HX PROSTATECTOMY      HX RETINAL DETACHMENT REPAIR  10/2003   . MEDICATIONS      Current Outpatient Medications   Medication Sig Dispense Refill    naproxen sodium (ALEVE) 220 mg cap Take 1 Cap by mouth two (2) times a day.  Prn pain 30 Cap 0    diclofenac (VOLTAREN) 1 % gel Apply  to affected area four (4) times daily. 100 g 4    budesonide-formoterol (SYMBICORT) 80-4.5 mcg/actuation HFAA INHALE 2 PUFFS INTO MOUTH ONCE DAILY      gabapentin (NEURONTIN) 100 mg capsule Take 1 tab PO BID PRN for nerve pain 45 Cap 0    Cetirizine (ZYRTEC) 10 mg cap Take  by mouth.  lisinopril-hydrochlorothiazide (PRINZIDE, ZESTORETIC) 10-12.5 mg per tablet Take 1 Tab by mouth daily.  methylPREDNISolone (MEDROL, SILVIA,) 4 mg tablet Take as directed 1 Dose Pack 0    albuterol (PROAIR HFA) 90 mcg/actuation inhaler Take 2 Puffs by inhalation daily. ALLERGIES  No Known Allergies       SOCIAL HISTORY    Social History     Socioeconomic History    Marital status:      Spouse name: Not on file    Number of children: Not on file    Years of education: Not on file    Highest education level: Not on file   Occupational History    Not on file   Social Needs    Financial resource strain: Not on file    Food insecurity:     Worry: Not on file     Inability: Not on file    Transportation needs:     Medical: Not on file     Non-medical: Not on file   Tobacco Use    Smoking status: Former Smoker     Last attempt to quit: 1978     Years since quittin.1    Smokeless tobacco: Never Used   Substance and Sexual Activity    Alcohol use:  Yes     Alcohol/week: 1.7 standard drinks     Types: 2 Standard drinks or equivalent per week    Drug use: No    Sexual activity: Not on file   Lifestyle    Physical activity:     Days per week: Not on file     Minutes per session: Not on file    Stress: Not on file   Relationships    Social connections:     Talks on phone: Not on file     Gets together: Not on file     Attends Baptism service: Not on file     Active member of club or organization: Not on file     Attends meetings of clubs or organizations: Not on file     Relationship status: Not on file    Intimate partner violence:     Fear of current or ex partner: Not on file     Emotionally abused: Not on file Physically abused: Not on file     Forced sexual activity: Not on file   Other Topics Concern    Not on file   Social History Narrative    Not on file       FAMILY HISTORY  No family history on file. REVIEW OF SYSTEMS  Review of Systems   Constitutional: Negative for chills, fever and weight loss. Respiratory: Negative for shortness of breath. Cardiovascular: Negative for chest pain. Gastrointestinal: Negative for constipation. Negative for fecal incontinence   Genitourinary: Negative for dysuria. Negative for urinary incontinence   Musculoskeletal:        Per HPI   Skin: Negative for rash. Neurological: Positive for sensory change. Negative for dizziness, tingling, tremors, focal weakness and headaches. Endo/Heme/Allergies: Bruises/bleeds easily. Psychiatric/Behavioral: The patient does not have insomnia. PHYSICAL EXAMINATION  Visit Vitals  /88 (BP 1 Location: Left arm, BP Patient Position: Sitting)   Pulse 79   Temp 97.8 °F (36.6 °C) (Oral)   Resp 16   Ht 5' 9\" (1.753 m)   Wt 188 lb (85.3 kg)   SpO2 97%   BMI 27.76 kg/m²         Accompanied by self. Constitutional:  Well developed, well nourished, in no acute distress. Psychiatric: Affect and mood are appropriate. Integumentary: No rashes or abrasions noted on exposed areas. Cardiovascular/Peripheral Vascular: No peripheral edema is noted BLE. SPINE/MUSCULOSKELETAL EXAM      Lumbar spine:  No rash, ecchymosis, or gross obliquity. No fasciculations. No focal atrophy is noted. Tenderness to palpation R L5-S1. No tenderness to palpation at the sciatic notch. SI joints non-tender. Trochanters non tender. MOTOR:       Hip Flex  Quads Hamstrings Ankle DF EHL Ankle PF   Right +4/5 +4/5 +4/5 +4/5 +4/5 +4/5   Left +4/5 +4/5 +4/5 +4/5 +4/5 +4/5       Straight Leg raise positive R 90 degrees. Ambulation without assistive device. FWB.       RADIOGRAPHS  Lumbar spine xray films reviewed:  1) compression fx L3 chronic  2) no instability      [facility]  OFFICE PROCEDURE PROGRESS NOTE      PROCEDURE: In the office today after informed consent using aseptic technique, the patient was injected with a total of 2 cc of Celestone, 0.5 cc each of Lidocaine and Marcaine into his right iliolumbar trigger point. Chart reviewed for the following:   I, Dr. Elana Hector, have reviewed the History, Physical and updated the Allergic reactions for Noah Mccracken. Local measures (ice/heat) and medications have not alleviated the symptoms. TIME OUT performed immediately prior to start of procedure:   IDr. Elana, have performed the following reviews on Noah Mccracken prior to the start of the procedure:            * Patient was identified by name and date of birth   * Agreement on procedure being performed was verified  * Risks and Benefits explained to the patient  * Procedure site verified and marked as necessary  * Patient was positioned for comfort  * Consent was signed and verified     Time: 1:06 PM     Date of procedure: 2/14/2020    Procedure performed by:  Irving Adams MD    Provider assisted by: None    Patient assisted by: Self    How tolerated by patient: Pt tolerated the procedure well with no complications. Written by Christopher Boston, as dictated by Elana Hector MD.    I, Dr. Elana Hector MD, confirm that all documentation is accurate. Mr. Tad Parikh may have a reminder for a \"due or due soon\" health maintenance. I have asked that he contact his primary care provider for follow-up on this health maintenance.

## 2020-02-12 NOTE — PATIENT INSTRUCTIONS
Learning About Trigger Point Injections  What are trigger point injections? A trigger point is a painful knot in a tight band of muscle. A trigger point often causes pain to be felt in other areas, too. For example, a trigger point in the neck or upper back can cause pain in the head. Trigger point injections are shots of medicine into these knots to help relieve the pain. The medicines are usually local anesthetics like lidocaine. Trigger point injections are often part of plan that includes other treatments, such as muscle stretching and strengthening. How is a trigger point injection done? Your doctor first locates a trigger point by pressing around the painful area. This may cause your muscle to hurt or twitch. This tells the doctor that he or she has found the spot to do the injection. The area is cleaned. Your doctor then injects the medicine into the trigger point. He or she may inject the medicine in more than one direction within the trigger point. The doctor may change direction without removing the needle. If you have more than one trigger point in the muscle, your doctor may repeat the process. Your doctor may stretch the area to help the muscle relax. He or she may also show you how to move and stretch the muscle yourself. The procedure takes about 10 to 30 minutes. How long it takes depends on how many trigger points are treated. But an injection itself takes only a few moments. What can you expect after a trigger point injection? The area may feel a bit numb for a few hours. It may also feel sore. Other problems from trigger point injections are rare. There is a chance of skin infection at the injection site. And if injections are done in the chest area, there is a small risk of puncturing the outer lining of the lung (pneumothorax). Trigger point injections may reduce some or all of your pain. But the pain can come back after the medicine wears off.  If your pain comes back, your doctor may suggest more shots or other treatment for longer-lasting relief. Follow your doctor's instructions carefully. And tell your doctor about any new or unusual symptoms, such as chest pain or shortness of breath. Follow-up care is a key part of your treatment and safety. Be sure to make and go to all appointments, and call your doctor if you are having problems. It's also a good idea to know your test results and keep a list of the medicines you take. Where can you learn more? Go to http://danny-luciano.info/. Enter 53473 [id number] 04 [id number] in the search box to learn more about \"Learning About Trigger Point Injections. \"  Current as of: June 26, 2019  Content Version: 12.2  © 5875-4540 [employer]. Care instructions adapted under license by [employer] (which disclaims liability or warranty for this information). If you have questions about a medical condition or this instruction, always ask your healthcare professional. [place] [address] any warranty or liability for your use of this information. Neuropathic Pain: Care Instructions  Your Care Instructions    Neuropathic pain is caused by pressure on or damage to your nerves. It's often simply called nerve pain. Some people feel this type of pain all the time. For others, it comes and goes. Diabetes, shingles, or an injury can cause nerve pain. Many people say the pain feels sharp, burning, or stabbing. But some people feel it as a dull ache. In some cases, it makes your skin very sensitive. So touch, pressure, and other sensations that did not hurt before may now cause pain. It's important to know that this kind of pain is real and can affect your quality of life. It's also important to know that treatment can help. Treatment includes pain medicines, exercise, and physical therapy. Medicines can help reduce the number of pain signals that travel over the nerves.  This can make the painful areas less sensitive. It can also help you sleep better and improve your mood. But medicines are only one part of successful treatment. Most people do best with more than one kind of treatment. Your doctor may recommend that you try cognitive-behavioral therapy and stress management. Or, if needed, you may decide to try to quit smoking, lower your blood pressure, or better control blood sugar. These kinds of healthy changes can also make a difference. If you feel that your treatment is not working, talk to your doctor. And be sure to tell your doctor if you think you might be depressed or anxious. These are common problems that can also be treated. Follow-up care is a key part of your treatment and safety. Be sure to make and go to all appointments, and call your doctor if you are having problems. It's also a good idea to know your test results and keep a list of the medicines you take. How can you care for yourself at home? · Be safe with medicines. Read and follow all instructions on the label. ? If the doctor gave you a prescription medicine for pain, take it as prescribed. ? If you are not taking a prescription pain medicine, ask your doctor if you can take an over-the-counter medicine. · Save hard tasks for days when you have less pain. Follow a hard task with an easy task. And remember to take breaks. · Relax, and reduce stress. You may want to try deep breathing or meditation. These can help. · Keep moving. Gentle, daily exercise can help reduce pain. Your doctor or physical therapist can tell you what type of exercise is best for you. This may include walking, swimming, and stationary biking. It may also include stretches and range-of-motion exercises. · Try heat, cold packs, and massage. · Get enough sleep. Constant pain can make you more tired. If the pain makes it hard to sleep, talk with your doctor. · Think positively. Your thoughts can affect your pain.  Do fun things to distract yourself from the pain. See a movie, read a book, listen to music, or spend time with a friend. · Keep a pain diary. Try to write down how strong your pain is and what it feels like. Also try to notice and write down how your moods, thoughts, sleep, activities, and medicine affect your pain. These notes can help you and your doctor find the best ways to treat your pain. Reducing constipation caused by pain medicine  Pain medicines often cause constipation. To reduce constipation:  · Include fruits, vegetables, beans, and whole grains in your diet each day. These foods are high in fiber. · Drink plenty of fluids, enough so that your urine is light yellow or clear like water. If you have kidney, heart, or liver disease and have to limit fluids, talk with your doctor before you increase the amount of fluids you drink. · Get some exercise every day. Build up slowly to 30 to 60 minutes a day on 5 or more days of the week. · Take a fiber supplement, such as Citrucel or Metamucil, every day if needed. Read and follow all instructions on the label. · Schedule time each day for a bowel movement. Having a daily routine may help. Take your time and do not strain when having a bowel movement. · Ask your doctor about a laxative. The goal is to have one easy bowel movement every 1 to 2 days. Do not let constipation go untreated for more than 3 days. When should you call for help? Call your doctor now or seek immediate medical care if:    · You feel sad, anxious, or hopeless for more than a few days. This could mean you are depressed. Depression is common in people who have a lot of pain. But it can be treated.     · You have trouble with bowel movements, such as:  ? No bowel movement in 3 days. ? Blood in the anal area, in your stool, or on the toilet paper.   ? Diarrhea for more than 24 hours.    Watch closely for changes in your health, and be sure to contact your doctor if:    · Your pain is getting worse.     · You can't sleep because of pain.     · You are very worried or anxious about your pain.     · You have trouble taking your pain medicine.     · You have any concerns about your pain medicine or its side effects.     · You have vomiting or cramps for more than 2 hours. Where can you learn more? Go to http://danny-luciano.info/. Enter X386 in the search box to learn more about \"Neuropathic Pain: Care Instructions. \"  Current as of: March 28, 2019  Content Version: 12.2  © 0477-5223 [employer]. Care instructions adapted under license by [employer] (which disclaims liability or warranty for this information). If you have questions about a medical condition or this instruction, always ask your healthcare professional. [address] any warranty or liability for your use of this information. Sciatica: Exercises  Introduction  Here are some examples of typical rehabilitation exercises for your condition. Start each exercise slowly. Ease off the exercise if you start to have pain. Your doctor or physical therapist will tell you when you can start these exercises and which ones will work best for you. When you are not being active, find a comfortable position for rest. Some people are comfortable on the floor or a medium-firm bed with a small pillow under their head and another under their knees. Some people prefer to lie on their side with a pillow between their knees. Don't stay in one position for too long. Take short walks (10 to 20 minutes) every 2 to 3 hours. Avoid slopes, hills, and stairs until you feel better. Walk only distances you can manage without pain, especially leg pain. How to do the exercises  Back stretches    1. Get down on your hands and knees on the floor. 2. Relax your head and allow it to droop. Round your back up toward the ceiling until you feel a nice stretch in your upper, middle, and lower back.  Hold this stretch for as long as it feels comfortable, or about 15 to 30 seconds. 3. Return to the starting position with a flat back while you are on your hands and knees. 4. Let your back sway by pressing your stomach toward the floor. Lift your buttocks toward the ceiling. 5. Hold this position for 15 to 30 seconds. 6. Repeat 2 to 4 times. Follow-up care is a key part of your treatment and safety. Be sure to make and go to all appointments, and call your doctor if you are having problems. It's also a good idea to know your test results and keep a list of the medicines you take. Where can you learn more? Go to http://danny-luciano.info/. Enter L000 in the search box to learn more about \"Sciatica: Exercises. \"  Current as of: June 26, 2019  Content Version: 12.2  © 7232-7418 [employer]. Care instructions adapted under license by [employer] (which disclaims liability or warranty for this information). If you have questions about a medical condition or this instruction, always ask your healthcare professional. [address] any warranty or liability for your use of this information.

## 2020-06-12 ENCOUNTER — OFFICE VISIT (OUTPATIENT)
Dept: ORTHOPEDIC SURGERY | Age: 85
End: 2020-06-12

## 2020-06-12 VITALS — BODY MASS INDEX: 27.05 KG/M2 | TEMPERATURE: 97.2 F | WEIGHT: 182.6 LBS | HEIGHT: 69 IN

## 2020-06-12 DIAGNOSIS — M17.11 PRIMARY OSTEOARTHRITIS OF RIGHT KNEE: Primary | ICD-10-CM

## 2020-06-12 DIAGNOSIS — M75.51 ACUTE BURSITIS OF RIGHT SHOULDER: ICD-10-CM

## 2020-06-12 RX ORDER — BETAMETHASONE SODIUM PHOSPHATE AND BETAMETHASONE ACETATE 3; 3 MG/ML; MG/ML
6 INJECTION, SUSPENSION INTRA-ARTICULAR; INTRALESIONAL; INTRAMUSCULAR; SOFT TISSUE ONCE
Qty: 1 ML | Refills: 0
Start: 2020-06-12 | End: 2020-06-12

## 2020-06-12 NOTE — PROGRESS NOTES
[address] [place] [address]  [phone]           Patient: Jonathan Cabrales                MRN: [id number]       SSN: [id number]  YOB: 1930        AGE: 80 y.o. SEX: male  Body mass index is 26.97 kg/m². PCP: Eligio Castro DO  06/12/20            REVIEW OF SYSTEMS:  Constitutional: Negative for fever, chills, weight loss and malaise/fatigue. HENT: Negative. Eyes: Negative. Respiratory: Negative. Cardiovascular: Negative. Gastrointestinal: No bowel incontinence or constipation. Genitourinary: No bladder incontinence or saddle anesthesia. Skin: Negative. Neurological: Negative. Endo/Heme/Allergies: Negative. Psychiatric/Behavioral: Negative. Musculoskeletal: As per HPI above. Past Medical History:   Diagnosis Date    Closed compression fracture of L3 lumbar vertebra 2/5/2018    Chronic aysmptomatic, noted on MRI 2018    Hypertension          Current Outpatient Medications:     betamethasone (Celestone Soluspan) 6 mg/mL injection, 1 mL by Intra artICUlar route once for 1 dose., Disp: 1 mL, Rfl: 0    methylPREDNISolone (MEDROL, SILVIA,) 4 mg tablet, Take as directed, Disp: 1 Dose Pack, Rfl: 0    naproxen sodium (ALEVE) 220 mg cap, Take 1 Cap by mouth two (2) times a day. Prn pain, Disp: 30 Cap, Rfl: 0    diclofenac (VOLTAREN) 1 % gel, Apply  to affected area four (4) times daily. , Disp: 100 g, Rfl: 4    budesonide-formoterol (SYMBICORT) 80-4.5 mcg/actuation HFAA, INHALE 2 PUFFS INTO MOUTH ONCE DAILY, Disp: , Rfl:     gabapentin (NEURONTIN) 100 mg capsule, Take 1 tab PO BID PRN for nerve pain, Disp: 45 Cap, Rfl: 0    albuterol (PROAIR HFA) 90 mcg/actuation inhaler, Take 2 Puffs by inhalation daily. , Disp: , Rfl:     Cetirizine (ZYRTEC) 10 mg cap, Take  by mouth., Disp: , Rfl:     lisinopril-hydrochlorothiazide (PRINZIDE, ZESTORETIC) 10-12.5 mg per tablet, Take 1 Tab by mouth daily. , Disp: , Rfl:     No Known Allergies    Social History     Socioeconomic History    Marital status:      Spouse name: Not on file    Number of children: Not on file    Years of education: Not on file    Highest education level: Not on file   Occupational History    Not on file   Social Needs    Financial resource strain: Not on file    Food insecurity     Worry: Not on file     Inability: Not on file    Transportation needs     Medical: Not on file     Non-medical: Not on file   Tobacco Use    Smoking status: Former Smoker     Last attempt to quit: 1978     Years since quittin.4    Smokeless tobacco: Never Used   Substance and Sexual Activity    Alcohol use: Yes     Alcohol/week: 1.7 standard drinks     Types: 2 Standard drinks or equivalent per week    Drug use: No    Sexual activity: Not on file   Lifestyle    Physical activity     Days per week: Not on file     Minutes per session: Not on file    Stress: Not on file   Relationships    Social connections     Talks on phone: Not on file     Gets together: Not on file     Attends Baptism service: Not on file     Active member of club or organization: Not on file     Attends meetings of clubs or organizations: Not on file     Relationship status: Not on file    Intimate partner violence     Fear of current or ex partner: Not on file     Emotionally abused: Not on file     Physically abused: Not on file     Forced sexual activity: Not on file   Other Topics Concern    Not on file   Social History Narrative    Not on file       Past Surgical History:   Procedure Laterality Date    HX CATARACT REMOVAL Left 2004    HX CATARACT REMOVAL Right 2004    HX HERNIA REPAIR  2001    HX PROSTATECTOMY      HX RETINAL DETACHMENT REPAIR  10/2003       Patient seen and evaluated today for his right knee. Does have known advanced arthritis of the right knee. He does state that he visited [facility] for an opinion.   He is not ready for surgery. He wants to see if the cortisone injection will help him. It has provided him some relief in the past.    He does have decreased walking tolerance. He has discomfort with getting up from a chair. He has discomfort with stairs. He has night pain on occasion. Patient denies recent fevers, chills, chest pain, SOB, or injuries. No recent systemic changes noted. A 12-point review of systems is performed today. Pertinent positives are noted. All other systems reviewed and otherwise are negative. Physical exam: General: Alert and oriented x3, nad.  well-developed, well nourished. normal affect, AF. NC/AT, EOMI, neck supple, trachea midline, no JVD present. Breathing is non-labored. Examination of lower extremities reveals pain-free range of the hips. There is no pain to palpation to the trochanteric bursa. Negative straight leg raise. Negative calf tenderness. No Homans. No signs of DVT present. Right knee the skin intact. There is no erythema, ecchymosis, warmth. There is no signs of infection or cellulitis. He does have findings consistent with advanced arthritis of the right knee with pain to palpation tricompartmentally and crepitus arising anterior compartment. Assessment: Right knee advanced osteoarthritis    Plan: At this point, we discussed treatment options. We will move for the close injection of the right knee today. After informed consent, under aseptic conditions, with US guided assitance, the right knee was prepped with betadine and 6mg of celestone was injected without complications. The patient tolerated the injection well. The patient is instructed on post-injection care. He will follow-up with us in 3 months time for evaluation. He will call with any questions or concerns that shall arise. Chart reviewed for the following:  Xavi YODER PA-C, have reviewed the History, Physical and updated the Allergic reactions for Ettie Hallmark?     TIME OUT performed immediately prior to start of procedure:  I, Tri Nice PA-C, have performed the following reviews on [facility] prior to the start of the procedure:  ????????  * Patient was identified by name and date of birth   * Agreement on procedure being performed was verified  * Risks and Benefits explained to the patient  * Procedure site verified and marked as necessary  * Patient was positioned for comfort  * Consent was signed and verified    Time:9:12 AM    Body part: right knee    Medication & Dose: 6mg celestone    Date of procedure: 06/12/20    Procedure performed by: Tri Nice PA-C    Provider assisted by: none    Patient assisted by: self    How tolerated by patient: tolerated the procedure well with no complications    Post Procedural Pain Scale: 8    Comments: none       JR Shaun GONZALEZ PA-C, ATC

## 2020-06-12 NOTE — PROGRESS NOTES
1. Have you been to the ER, urgent care clinic since your last visit? Hospitalized since your last visit? No    2. Have you seen or consulted any other health care providers outside of the [address] [place] [address] since your last visit? Include any pap smears or colon screening.  No

## 2020-11-20 ENCOUNTER — HOSPITAL ENCOUNTER (OUTPATIENT)
Dept: PHYSICAL THERAPY | Age: 85
Discharge: HOME | End: 2020-11-20
Payer: MEDICARE

## 2020-11-20 PROCEDURE — 97110 THERAPEUTIC EXERCISES: CPT

## 2020-11-20 PROCEDURE — 97162 PT EVAL MOD COMPLEX 30 MIN: CPT

## 2020-11-20 NOTE — PROGRESS NOTES
[facility]  [facility]  [address] [place] [address]  [phone] fax    Plan of Care/ Statement of Necessity for Physical Therapy Services    Patient name: Rey Go Start of Care: 2020   Referral source: Asa Oliveira : 10/8/1930    Medical Diagnosis: Right knee pain [M25.561]  Payor: Shanta Sanches / Plan: [facility] MEDICARE CHOICE PPO/PFFS / Product Type: Managed Care Medicare /  Onset Date: 10/13/2020   Treatment Diagnosis: pain in right knee, other abnormalities of gait and mobility   Prior Hospitalization: see medical history Provider#: [id number]   Medications: Verified on Patient summary List    Comorbidities: COPD, cellulitis following surgery   Prior Level of Function: Patient ambulating without AD, independent for ADLs      The Plan of Care and following information is based on the information from the initial evaluation. Assessment/ key information: Patient is a 80year old male who reports to therapy following a right total knee arthroplasty on 10/13/2020. Patient had home health PT following surgery. Patient also had cellulitis following surgery. Patient's chief complaint is lack of normal ROM. He reports greatest increase in pain when riding his bike at home and greatest relief from pain and swelling following ice and elevation. Patient presents with decreased knee ROM, decreased LE strength, swelling and pitting edema (4+ with >60 seconds to full rebound) to right LE, decreased patellar mobility, and impaired gait. Pt will benefit from skilled PT to address above mentioned deficits to return to prior level of function, increase independence with ADL, and improve overall quality of life.     Evaluation Complexity History HIGH Complexity :3+ comorbidities / personal factors will impact the outcome/ POC ; Examination MEDIUM Complexity : 3 Standardized tests and measures addressing body structure, function, activity limitation and / or participation in recreation  ;Presentation MEDIUM Complexity : Evolving with changing characteristics  ; Clinical Decision Making MEDIUM Complexity : FOTO score of 26-74  Overall Complexity Rating: MEDIUM  Problem List: pain affecting function, decrease ROM, decrease strength, edema affecting function, impaired gait/ balance, decrease ADL/ functional abilitiies, decrease activity tolerance, decrease flexibility/ joint mobility and decrease transfer abilities   Treatment Plan may include any combination of the following: Therapeutic exercise, Therapeutic activities, Neuromuscular re-education, Physical agent/modality, Gait/balance training, Manual therapy, Patient education, Self Care training, Functional mobility training, Home safety training and Stair training  Patient / Family readiness to learn indicated by: asks questions, participates in activities  Persons(s) to be included in education: patient  Barriers to Learning/Limitations: hearing and visual impairments  Patient Goal (s): to be able to bend my knee  Patient Self Reported Health Status: good  Rehabilitation Potential: good    Short Term Goals: To be accomplished in 1 weeks:   1. Patient will be compliant in initial HEP to optimize therapy outcomes. Long Term Goals: To be accomplished in 4 weeks:   1. Patient will improve FOTO score to 65 points to demonstrate functional improvement. 2. Patient will report a 50% increase in overall improvement since start of care in order to improve quality of life. 3. Patient will demonstrate knee flexion ROM of at least 105 deg and knee extension ROM of 0 deg to improve gait mechanics. 4. Patient will improve hip ext MMT to at least 4/5 to increase ease with functional activities. Frequency / Duration: Patient to be seen 2 times per week for 4 weeks.     Patient/ Caregiver education and instruction: Diagnosis, prognosis, self care, activity modification and exercises   [x]  Plan of care has been reviewed with PTA    Certification Period: 11/20/2020-12/18/2020  Lela Shah, PT 11/20/2020 2:20 PM    ________________________________________________________________________    I certify that the above Therapy Services are being furnished while the patient is under my care. I agree with the treatment plan and certify that this therapy is necessary.     Physician's Signature:____________Date:_________TIME:________    ** Signature, Date and Time must be completed for valid certification **    Please sign and return to [facility]  [facility] Mancilla  [address] [place] [address]  [phone] fax

## 2020-11-20 NOTE — PROGRESS NOTES
PT DAILY TREATMENT NOTE/KNEE EVAL     Patient Name: Murry Hatchet  Date:2020  : 10/8/1930  [x]  Patient  Verified  Payor: Benjamin Burn / Plan: [facility] [employer] MEDICARE CHOICE PPO/PFFS / Product Type: Managed Care Medicare /    In time: 2:22P Out time:3:08P  Total Treatment Time (min): 46  Visit #: 1 of 12    Medicare/BCBS Only   Total Timed Codes (min):  10 1:1 Treatment Time:  46     Treatment Area: Right knee pain [M25.561]    SUBJECTIVE   Pain Level (0-10 scale): 0/10 (1/10 at worst)   []constant [x]intermittent [x]improving []worsening []no change since onset    Any medication changes, allergies to medications, adverse drug reactions, diagnosis change, or new procedure performed?: [x] No    [] Yes (see summary sheet for update)  Subjective functional status/changes:     PLOF:  Patient ambulating without AD, independent for ADLs  Limitations to PLOF: bending knee back, driving, step to step, no AD  Mechanism of Injury: right TKA  Current symptoms/Complaints: flexion, swelling,   Previous Treatment/Compliance:   PMHx/Surgical Hx: right TKA 10/13/2020, home health PT following surgery  Work Hx: retired  Living Situation: 2 story home, lives alone, wife and three kids help out  Pt Goals: \"to be able to bend my knee\"    Patient had cellulitis following surgery. Pain and swelling relieved with ice and elevation. Increase in pain with sitting or standing >20 min. Patient reports the more he works his knee, the better it feels. OBJECTIVE/EXAMINATION  Mobility: mod I for bed mobility  Self Care:  Ind with bathing and dressing; family helps when needed        36 min [x]Eval                  []Re-Eval       10 min Therapeutic Exercise:  [x] See flow sheet :   Rationale: increase ROM and increase strength to improve the patients ability to perform ADLs with increased ease          With   [] TE   [] TA   [] neuro   [] other: Patient Education: [x] Review HEP    [] Progressed/Changed HEP based on: [] positioning   [] body mechanics   [] transfers   [] heat/ice application    [] other: HEP, purpose and importance of physical therapy     Other Objective/Functional Measures:     Physical Therapy Evaluation - Knee    Gait:  [] Normal    [x] Abnormal    [x] Antalgic    [] NWB    Device:    Describe: increased hip flexion and bilateral knee flexion throughout gait cycle, decreased stance time on right    ROM / Strength  [] Unable to assess                  AROM                      PROM                   Strength (1-5)    Left Right Left Right Left Right   Hip Flexion     4 3    Extension     3 3    Abduction     4+ 4    Adduction         Knee Extension -2 -11  -10 4+ 3-    Flexion 130 87  90 5 4+   Ankle Plantarflexion          Dorsiflexion     4+ 5       Hip IR MMT    Left 4+  Right 4  Hip ER MMT Left 4  Right 4    Palpation:    Pitting edema: 4+ in right lower leg (>60 sec for full rebound)    Optional Tests:      Patellar Mobility   []L []R Hypermobile []L [x]R Hypomobile         Girth Measurements:     Cm at joint line   Left 43   Right  44                 Other tests/comments:    Incision intact, healing normally, hypermobile inferior 2-3 cm of incision  Patient with good understanding of precautions of no kneeling and navigating stairs in step to step pattern (no reciprocal stair negotiation)    Pain Level (0-10 scale) post treatment: 0/10    ASSESSMENT/Changes in Function:     See POC    Patient will continue to benefit from skilled PT services to modify and progress therapeutic interventions, address functional mobility deficits, address ROM deficits, address strength deficits, analyze and address soft tissue restrictions, analyze and cue movement patterns, analyze and modify body mechanics/ergonomics, assess and modify postural abnormalities and instruct in home and community integration to attain remaining goals.      [x]  See Plan of Care  []  See progress note/recertification  []  See Discharge Summary         Progress towards goals / Updated goals:    See POC    PLAN  []  Upgrade activities as tolerated     [x]  Continue plan of care  []  Update interventions per flow sheet       []  Discharge due to:_  []  Other:_      Yue Motley PT 11/20/2020  2:20 PM

## 2020-12-04 ENCOUNTER — HOSPITAL ENCOUNTER (OUTPATIENT)
Dept: PHYSICAL THERAPY | Age: 85
Discharge: HOME | End: 2020-12-04
Payer: MEDICARE

## 2020-12-04 PROCEDURE — 97110 THERAPEUTIC EXERCISES: CPT

## 2020-12-04 PROCEDURE — 97140 MANUAL THERAPY 1/> REGIONS: CPT

## 2020-12-04 NOTE — PROGRESS NOTES
PT DAILY TREATMENT NOTE     Patient Name: Monica Molina  Date:2020  : 10/8/1930  [x]  Patient  Verified  Payor: Genesis Ulloa / Plan: [facility] MEDICARE CHOICE PPO/PFFS / Product Type: Managed Care Medicare /    In time: 3:02  Out time: 3:46  Total Treatment Time (min): 44  Visit #: 2 of 12    Medicare/BCBS Only   Total Timed Codes (min):  44 1:1 Treatment Time:  44       Treatment Area: Right knee pain [M25.561]    SUBJECTIVE  Pain Level (0-10 scale):  0/10  Any medication changes, allergies to medications, adverse drug reactions, diagnosis change, or new procedure performed?: [x] No    [] Yes (see summary sheet for update)  Subjective functional status/changes:   [] No changes reported  Pt. Reports his knee continues to feel stiff. He has been working on his home exercises and has a bike at home. OBJECTIVE    36 min Therapeutic Exercise:  [x] See flow sheet :   Rationale: increase ROM and increase strength to improve the patients ability to increase ease of ambulation     8 min Manual Therapy:  Patella mobs, DTM to distal quads   The manual therapy interventions were performed at a separate and distinct time from the therapeutic activities interventions. Rationale: decrease pain, increase ROM and increase tissue extensibility to increase ease of ambulation           With   [x] TE   [] TA   [] neuro   [] other: Patient Education: [x] Review HEP    [] Progressed/Changed HEP based on:   [] positioning   [] body mechanics   [] transfers   [] heat/ice application    [] other:      Other Objective/Functional Measures:   Firm end feel with knee flexion PROM with mild pain  Hypomobility of patella  He was educated on step lunge stretch and tricky toe taps for HEP  He was challenged with 4 inch eccentric step downs. Pain Level (0-10 scale) post treatment:  0/10    ASSESSMENT/Changes in Function:  Pt. Initiated PT and is compliant with his HEP.  He continues to have significant decrease in right knee mobility with firm end feel at end ranges. He also continues to demonstrate decreased knee strength during therex. Patient will continue to benefit from skilled PT services to modify and progress therapeutic interventions, address functional mobility deficits, address ROM deficits, address strength deficits, analyze and address soft tissue restrictions, analyze and cue movement patterns, analyze and modify body mechanics/ergonomics and assess and modify postural abnormalities to attain remaining goals. Progress towards goals / Updated goals:  Short Term Goals: To be accomplished in 1 weeks:              1. Patient will be compliant in initial HEP to optimize therapy outcomes. Met (12/4/20)    Long Term Goals: To be accomplished in 4 weeks:              1. Patient will improve FOTO score to 65 points to demonstrate functional improvement. 2. Patient will report a 50% increase in overall improvement since start of care in order to improve quality of life. 3. Patient will demonstrate knee flexion ROM of at least 105 deg and knee extension ROM of 0 deg to improve gait mechanics.                4. Patient will improve hip ext MMT to at least 4/5 to increase ease with functional activities.       PLAN  []  Upgrade activities as tolerated     [x]  Continue plan of care  []  Update interventions per flow sheet       []  Discharge due to:_  []  Other:_      Silvia Tao PT 12/4/2020  8:04 AM    Future Appointments   Date Time Provider Davina King   12/4/2020  3:00 PM [facility]

## 2020-12-08 ENCOUNTER — HOSPITAL ENCOUNTER (OUTPATIENT)
Dept: PHYSICAL THERAPY | Age: 85
Discharge: HOME | End: 2020-12-08
Payer: MEDICARE

## 2020-12-08 PROCEDURE — 97140 MANUAL THERAPY 1/> REGIONS: CPT

## 2020-12-08 PROCEDURE — 97110 THERAPEUTIC EXERCISES: CPT

## 2020-12-08 NOTE — PROGRESS NOTES
PT DAILY TREATMENT NOTE     Patient Name: Brooke Haywood  Date:2020  : 10/8/1930  [x]  Patient  Verified  Payor: Mitchell Hansen / Plan: [facility] MEDICARE CHOICE PPO/PFFS / Product Type: Managed Care Medicare /    In time:3:09  Out time:4:49  Total Treatment Time (min): 40  Visit #: 3 of 12    Medicare/BCBS Only   Total Timed Codes (min):  40 1:1 Treatment Time:  40       Treatment Area: Right knee pain [M25.561]    SUBJECTIVE  Pain Level (0-10 scale): 1/10  Any medication changes, allergies to medications, adverse drug reactions, diagnosis change, or new procedure performed?: [x] No    [] Yes (see summary sheet for update)  Subjective functional status/changes:   [] No changes reported  Pt reports that the swelling from the cellulitis is getting better. He was not too sore after last therapy session. He is doing the step lunge stretch at home and that's really helping. He is also using his stationary bike at home. He is icing for 30 minutes at a time. He really wants his knee to bend more. OBJECTIVE    32 min Therapeutic Exercise:  [x] See flow sheet :   Rationale: increase ROM, increase strength, improve balance and increase proprioception to improve the patients ability to improve ease of ambulation and ADLs    8 min Manual Therapy:  Right knee grade 3 medial/lateral and superior/inferior patellar mobs    The manual therapy interventions were performed at a separate and distinct time from the therapeutic activities interventions.   Rationale: decrease pain, increase ROM and increase tissue extensibility to improve knee mobility to normalize gait             With  [] TE   [] TA   [] neuro   [] other: Patient Education: [x] Review HEP    [] Progressed/Changed HEP based on:   [] positioning   [] body mechanics   [] transfers   [] heat/ice application    [x] other: reviewed ice 10-20 minutes but no more than 20 minutes, reviewed self patellar mobs 5 minutes daily      Other Objective/Functional Measures:     Required cues for upright trunk and \"knees behind toes\" with mini squats  No difficulty with leg press  Pt was pleased with step lunge stretch   Left knee flexion AAROM with heel slides: 84*  No pain following session     Pain Level (0-10 scale) post treatment: 0/10    ASSESSMENT/Changes in Function:     Pt is making slow, steady progress towards initial goals in therapy. He continues to demonstrate significant knee AROM deficits. Pt was educated regarding self-patellar mobs with HEP to attempt to improve knee mobility. Will continue to address strength and ROM deficits in order to normalize gait and improve ease/ability with daily tasks. Patient will continue to benefit from skilled PT services to modify and progress therapeutic interventions, address functional mobility deficits, address ROM deficits, address strength deficits, analyze and address soft tissue restrictions, analyze and cue movement patterns, analyze and modify body mechanics/ergonomics, assess and modify postural abnormalities, address imbalance/dizziness and instruct in home and community integration to attain remaining goals. Progress towards goals / Updated goals:  Short Term Goals: To be accomplished in 1 weeks:              1. Patient will be compliant in initial HEP to optimize therapy outcomes. Met (12/4/20)     Long Term Goals: To be accomplished in 4 weeks:              7. KGNRXOY will improve FOTO score to 65 points to demonstrate functional improvement.              2. Patient will report a 50% increase in overall improvement since start of care in order to improve quality of life.               3. Patient will demonstrate knee flexion ROM of at least 105 deg and knee extension ROM of 0 deg to improve gait mechanics. 21 . Patient will improve hip ext MMT to at least 4/5 to increase ease with functional activities.     PLAN  []  Upgrade activities as tolerated     []  Continue plan of care  []  Update interventions per flow sheet       []  Discharge due to:_  []  Other:_      Ady Cho, PT 12/8/2020  3:11 PM    Future Appointments   Date Time Provider Davina King   12/11/2020  2:15 PM Aaron Osorio, PT [facility]   12/15/2020  2:15 PM Tc Contreras, SUE [facility]   12/17/2020  2:15 PM Tc Contreras, [place] [facility]   12/22/2020  2:15 PM Tc Contreras [place] [facility]   12/24/2020 10:30 AM Gian Guzman, PT [facility]   12/29/2020  2:15 PM Tc Contreras, PT [facility]   12/31/2020  2:15 PM Tc Contreras, [place] [facility]   1/4/2021  2:15 PM Gian Guzman, PT [facility]   1/7/2021  2:15 PM Gian Guzman, PT [facility]   1/14/2021  2:15 PM Tc Contreras, PT [facility]

## 2020-12-11 ENCOUNTER — HOSPITAL ENCOUNTER (OUTPATIENT)
Dept: PHYSICAL THERAPY | Age: 85
Discharge: HOME | End: 2020-12-11
Payer: MEDICARE

## 2020-12-11 PROCEDURE — 97110 THERAPEUTIC EXERCISES: CPT | Performed by: GENERAL ACUTE CARE HOSPITAL

## 2020-12-11 PROCEDURE — 97140 MANUAL THERAPY 1/> REGIONS: CPT | Performed by: GENERAL ACUTE CARE HOSPITAL

## 2020-12-11 NOTE — PROGRESS NOTES
PT DAILY TREATMENT NOTE     Patient Name: Arnell Leventhal  Date:2020  : 10/8/1930  [x]  Patient  Verified  Payor: Jesu Negrete / Plan: [facility] MEDICARE CHOICE PPO/PFFS / Product Type: Managed Care Medicare /    In time: 215  Out time: 305  Total Treatment Time (min): 50  Visit #: 4 of 12    Medicare/BCBS Only   Total Timed Codes (min):  50 1:1 Treatment Time:  50       Treatment Area: Right knee pain [M25.561]    SUBJECTIVE  Pain Level (0-10 scale): 1/10   Any medication changes, allergies to medications, adverse drug reactions, diagnosis change, or new procedure performed?: [x] No    [] Yes (see summary sheet for update)  Subjective functional status/changes:   [] No changes reported  Pt reports having most trouble with knee flexion. Has been regularly using the bike at home. OBJECTIVE    38 min Therapeutic Exercise:  [x] See flow sheet :   Rationale: increase ROM, increase strength, improve balance and increase proprioception to improve the patients ability to improve ease of ambulation and ADLs    12 min Manual Therapy:  Patellar mobs - all directions. Ant/post tib-fem mobs f/b R knee PROM to end ranges. EOB distraction + knee flexion    The manual therapy interventions were performed at a separate and distinct time from the therapeutic activities interventions. Rationale: decrease pain, increase ROM and increase tissue extensibility to improve knee mobility to normalize gait             With  [] TE   [] TA   [] neuro   [] other: Patient Education: [x] Review HEP    [] Progressed/Changed HEP based on:   [] positioning   [] body mechanics   [] transfers   [] heat/ice application    [] other:     Other Objective/Functional Measures:      Added recumbent bike for knee ROM and endurance  Added SLR abd - TC's throughout for proper form   (+) quad lag with SLR  PROM  Knee flexion 96 deg EOB     Pain Level (0-10 scale) post treatment: 0/10    ASSESSMENT/Changes in Function:   Pt presents with significant R knee AROM deficits. Moderate guarding present with supine PROM into knee flexion, improved response to EOB today with cues to reduce guarding. Progressing well with strength gains. Educated on elevating leg when icing to address swelling and pitting edema. Will continue to address strength and ROM deficits in order to normalize gait and improve ease/ability with daily tasks. Patient will continue to benefit from skilled PT services to modify and progress therapeutic interventions, address functional mobility deficits, address ROM deficits, address strength deficits, analyze and address soft tissue restrictions, analyze and cue movement patterns, analyze and modify body mechanics/ergonomics, assess and modify postural abnormalities, address imbalance/dizziness and instruct in home and community integration to attain remaining goals. Progress towards goals / Updated goals:  Short Term Goals: To be accomplished in 1 weeks:              1. Patient will be compliant in initial HEP to optimize therapy outcomes. Met (12/4/20)     Long Term Goals: To be accomplished in 4 weeks:              6. QRYPMPX will improve FOTO score to 65 points to demonstrate functional improvement.              2. Patient will report a 50% increase in overall improvement since start of care in order to improve quality of life.               3. Patient will demonstrate knee flexion ROM of at least 105 deg and knee extension ROM of 0 deg to improve gait mechanics. PROM 96 deg flexion 12/11/20              4. Patient will improve hip ext MMT to at least 4/5 to increase ease with functional activities.     PLAN  []  Upgrade activities as tolerated     []  Continue plan of care  []  Update interventions per flow sheet       []  Discharge due to:_  []  Other:_      Lai Stone PT 12/11/2020  3:11 PM    Future Appointments   Date Time Provider Davina King   12/11/2020  2:15 PM Supriya Elise PT [facility] 12/15/2020  2:15 PM Tc Francis, PT [facility]   12/17/2020  2:15 PM Tc Francis, [place] [facility]   12/22/2020  2:15 PM Tc Francis, [place] [facility]   12/24/2020 10:30 AM Fabiano Vashti, PT [facility]   12/29/2020  2:15 PM Tc Francis, PT [facility]   12/31/2020  2:15 PM Tc Francis, [place] [facility]   1/4/2021  2:15 PM Fabiano Vashti, PT [facility]   1/7/2021  2:15 PM Fabiano Vashti, PT [facility]   1/14/2021  2:15 PM Tc Francis, PT [facility]

## 2020-12-15 ENCOUNTER — HOSPITAL ENCOUNTER (OUTPATIENT)
Dept: PHYSICAL THERAPY | Age: 85
Discharge: HOME | End: 2020-12-15
Payer: MEDICARE

## 2020-12-15 PROCEDURE — 97140 MANUAL THERAPY 1/> REGIONS: CPT

## 2020-12-15 PROCEDURE — 97110 THERAPEUTIC EXERCISES: CPT

## 2020-12-15 NOTE — PROGRESS NOTES
PT DAILY TREATMENT NOTE     Patient Name: Arturo Miranda  Date:12/15/2020  : 10/8/1930  [x]  Patient  Verified  Payor: Lenard Edmonds / Plan: [facility] [employer] MEDICARE CHOICE PPO/PFFS / Product Type: Managed Care Medicare /    In time:2:15P  Out time:2:57P  Total Treatment Time (min): 42  Visit #: 5 of 12    Medicare/BCBS Only   Total Timed Codes (min):  42 1:1 Treatment Time:  40       Treatment Area: Right knee pain [M25.561]    SUBJECTIVE  Pain Level (0-10 scale): 1/10  Any medication changes, allergies to medications, adverse drug reactions, diagnosis change, or new procedure performed?: [x] No    [] Yes (see summary sheet for update)  Subjective functional status/changes:   [] No changes reported    Patient reports he still is frustrated with lack of motion in his knee. He reports he does not have difficulty walking or navigating stairs. Patient reports performing patellar mobs at home. He reports he has pain after 30 min when wearing his brace while riding his bike at home. He was scheduled to speak with someone in his [address] [place] [address] office regarding this, but he did not receive a call yesterday. He plans to follow up via phone call today. OBJECTIVE      32 min Therapeutic Exercise:  [x] See flow sheet :   Rationale: increase ROM and increase strength to improve the patients ability to ambulate with increased ease    10 min Manual Therapy:  Patellar mobs-all directions; anterior/posterior tibial/femoral joint mobs; knee flexion with distraction at EOB   The manual therapy interventions were performed at a separate and distinct time from the therapeutic activities interventions.   Rationale: increase ROM and increase tissue extensibility to improve           With   [x] TE   [] TA   [] neuro   [] other: Patient Education: [x] Review HEP    [] Progressed/Changed HEP based on:   [] positioning   [] body mechanics   [] transfers   [] heat/ice application    [x] other: anatomy and biomechanics of patella as it relates to condition      Other Objective/Functional Measures:     Tactile and verbal cues for form with sidelying hip ABD  Quad lag associated with increased reps/fatigue with SLR     Pain Level (0-10 scale) post treatment: 0/10    ASSESSMENT/Changes in Function: Patient continues to put forth good effort and denies pain during therapy session. Compliant with HEP and activities in session. Significant knee flexion AROM deficits remain. Will continue to address strength and ROM deficits and progress as able. Patient will continue to benefit from skilled PT services to modify and progress therapeutic interventions, address functional mobility deficits, address ROM deficits, address strength deficits, analyze and address soft tissue restrictions, analyze and cue movement patterns, analyze and modify body mechanics/ergonomics, assess and modify postural abnormalities, address imbalance/dizziness and instruct in home and community integration to attain remaining goals. Progress towards goals / Updated goals:  Short Term Goals: To be accomplished in 1 weeks:              1. Patient will be compliant in initial HEP to optimize therapy outcomes. Met (12/4/20)     Long Term Goals: To be accomplished in 4 weeks:              7. SKHUCAK will improve FOTO score to 65 points to demonstrate functional improvement.              2. Patient will report a 50% increase in overall improvement since start of care in order to improve quality of life.               3. Patient will demonstrate knee flexion ROM of at least 105 deg and knee extension ROM of 0 deg to improve gait mechanics.  PROM 96 deg flexion 12/11/20              4. Patient will improve hip ext MMT to at least 4/5 to increase ease with functional activities.       PLAN  [x]  Upgrade activities as tolerated     [x]  Continue plan of care  []  Update interventions per flow sheet       []  Discharge due to:_  []  Other:_      Craig Booth PT 12/15/2020  2:16 PM    Future Appointments   Date Time Provider Davina Shirleyi   12/17/2020  2:15 PM Domitila Salamanca [facility]   12/22/2020  2:15 PM Manju Kelsie [facility]   12/24/2020 10:30 AM Nito Stern, PT [facility]   12/29/2020  2:15 PM Tc Matthews, PT [facility]   12/31/2020  2:15 PM Domitila Salamanca [facility]   1/4/2021  2:15 PM Nito Stern, PT [facility]   1/7/2021  2:15 PM Nito Stern, PT [facility]   1/14/2021  2:15 PM Nito Stern, PT [facility]

## 2020-12-17 ENCOUNTER — HOSPITAL ENCOUNTER (OUTPATIENT)
Dept: PHYSICAL THERAPY | Age: 85
Discharge: HOME | End: 2020-12-17
Payer: MEDICARE

## 2020-12-17 PROCEDURE — 97164 PT RE-EVAL EST PLAN CARE: CPT

## 2020-12-17 PROCEDURE — 97110 THERAPEUTIC EXERCISES: CPT

## 2020-12-17 PROCEDURE — 97140 MANUAL THERAPY 1/> REGIONS: CPT

## 2020-12-17 NOTE — PROGRESS NOTES
[facility]  [employer]  [address] [place] [address]  [phone] fax    Continued Plan of Care/ Re-certification for Physical Therapy Services    Patient name: Angelic Felix Start of Care: 2020   [facility] 10/8/1930               Medical Diagnosis: Right knee pain [M25.561]  Payor: [facility] / Plan: [facility] MEDICARE CHOICE PPO/PFFS / Product Type: Managed Care Medicare /  Onset Date: 10/13/2020   Treatment Diagnosis: pain in right knee, other abnormalities of gait and mobility   Prior Hospitalization: see medical history Provider#: [id number]   Medications: Verified on Patient summary List    Comorbidities: COPD, cellulitis following surgery   Prior Level of Function: Patient ambulating without AD, independent for ADLs                            Visits from Start of Care: 6    Missed Visits: 0    The Plan of Care and following information is based on the patient's current status:  Goal: Patient will improve FOTO score to 65 points to demonstrate functional improvement. Status at last note/certification: Met improved to 78 2020  Current Status: met    Goal: Patient will report a 50% increase in overall improvement since start of care in order to improve quality of life. Status at last note/certification: Not met, no improvement with bending knee but pt also reports no problem with ADLs/amb and stair 2020  Current Status: not met    Goal: Patient will demonstrate knee flexion ROM of at least 105 deg and knee extension ROM of 0 deg to improve gait mechanics.   Status at last note/certification: PROM 96 deg flexion 20; AROM: -7 to 90 degrees after manual 2020  Current Status: not met    Goal: Patient will improve hip ext MMT to at least 4/5 to increase ease with functional activities.    Status at last note/certification: Met 39-  Current Status: met    Key functional changes: improving ease with ADLs, amb and stair negotiation      Problems/ barriers to goal attainment: limited ROM, poor tolerance and compliance/patience with HEP and splint use     Problem List: pain affecting function, decrease ROM, decrease strength, edema affecting function, impaired gait/ balance, decrease ADL/ functional abilitiies, decrease activity tolerance, decrease flexibility/ joint mobility and decrease transfer abilities    Treatment Plan: Therapeutic exercise, Therapeutic activities, Neuromuscular re-education, Physical agent/modality, Gait/balance training, Manual therapy, Patient education, Self Care training, Functional mobility training, Home safety training and Stair training     Patient Goal (s) has been updated and includes: bend my knee more     Goals for this certification period to be accomplished in 4 weeks:  Goal: Patient will report a 50% increase in overall improvement since start of care in order to improve quality of life. Status at last note/certification: Not met, no improvement with bending knee but pt also reports no problem with ADLs/amb and stair 2-    Goal: Patient will demonstrate knee flexion ROM of at least 105 deg and knee extension ROM of 0 deg to improve gait mechanics.   Status at last note/certification: PROM 96 deg flexion 12/11/20; AROM: -7 to 90 degrees after manual 12-    Goal: Patient will be independent with HEP to improve ease with amb and bending his knee for ADLs performance. Status at last note/certification: Met 51-    Frequency / Duration: Patient to be seen 2-3 times per week for 4 weeks:    Assessment / Recommendations:  Pt reports no problem with ADLs and stair, he's able to amb with no AD and reports driving with no difficulty. His main concern is difficulty with bending his knee. ROM was -10 to 80 degrees; improved to -7 to 90 degrees after manual. Pt is very discouraged and frustrated due to his poor progress.  He can't tolerate Sharan How brace,\" deemed to be dynamic splint based on pt's description. Strongly encourage pt to be patient and persistent with HEP & brace use. He will see MD on 12-, recommended pt to discuss with MD for further instruction. Patient will continue to benefit from skilled PT services to modify and progress therapeutic interventions, address functional mobility deficits, address ROM deficits, address strength deficits, analyze and address soft tissue restrictions, analyze and cue movement patterns, analyze and modify body mechanics/ergonomics, assess and modify postural abnormalities, address imbalance/dizziness and instruct in home and community integration to attain remaining goals. Certification Period: 12- to 1-    Mae Guadalupe, PT 12/17/2020 8:08 AM    ________________________________________________________________________  I certify that the above Therapy Services are being furnished while the patient is under my care. I agree with the treatment plan and certify that this therapy is necessary. [] I have read the above and request that my patient continue as recommended.   [] I have read the above report and request that my patient continue therapy with the following changes/special instructions: _______________________________________  [] I have read the above report and request that my patient be discharged from therapy    Physician's Signature:____________Date:_________TIME:________    ** Signature, Date and Time must be completed for valid certification **    Please sign and return to [facility]  [employer] [facility] [employer]  [facility]  [address] [place] [address]  [phone] fax

## 2020-12-17 NOTE — PROGRESS NOTES
PT DAILY TREATMENT NOTE     Patient Name: Anton Garcia  Date:2020  : 10/8/1930  [x]  Patient  Verified  Payor: Ranjith Kuhn / Plan: [facility] HUMANA MEDICARE CHOICE PPO/PFFS / Product Type: Managed Care Medicare /    In time: 2:22  Out time:3:07  Total Treatment Time (min): 45  Visit #: 6 of 12    Medicare/BCBS Only   Total Timed Codes (min):  35 1:1 Treatment Time:  45       Treatment Area: Right knee pain [M25.561]    SUBJECTIVE  Pain Level (0-10 scale): 1/10  Any medication changes, allergies to medications, adverse drug reactions, diagnosis change, or new procedure performed?: [x] No    [] Yes (see summary sheet for update)  Subjective functional status/changes:   [] No changes reported  Pt reports he doesn't have any patience for his knee. He can't bend it and sees no improvement with that during the last 1 month with PT. The brace/dnamic splint has been \"a torture machine\" so he can't use it. He will see the prep for his brace tomorrow (2020) and his referring MD on 2020.     OBJECTIVE      10 min []Eval                  [x]Re-Eval       25 min Therapeutic Exercise:  [x] See flow sheet :   Rationale: increase ROM and increase strength to improve the patients ability to amb and perform ADLs with more ease    10 min Manual patella mob in all direction, PROM, overstretching for ROM, ant & post mobs grade 3-4          With   [] TE   [] TA   [] neuro   [] other: Patient Education: [x] Review HEP    [] Progressed/Changed HEP based on:   [] positioning   [] body mechanics   [] transfers   [] heat/ice application    [] other:      Other Objective/Functional Measures:    AROM: -10 to 80 degrees; improved to -7 to 90 degrees after manual   FOTO: 78    Very poor attitude today    Pain Level (0-10 scale) post treatment: 0/10    ASSESSMENT/Changes in Function: see Re-certification please    Patient will continue to benefit from skilled PT services to modify and progress therapeutic interventions, address functional mobility deficits, address ROM deficits, address strength deficits, analyze and address soft tissue restrictions, analyze and cue movement patterns, analyze and modify body mechanics/ergonomics, assess and modify postural abnormalities, address imbalance/dizziness and instruct in home and community integration to attain remaining goals. []  See Plan of Care  [x]  See progress note/recertification  []  See Discharge Summary         Progress towards goals / Updated goals:  Short Term Goals: To be accomplished in 1 weeks:              1. Patient will be compliant in initial HEP to optimize therapy outcomes. Met (12/4/20)     Long Term Goals: To be accomplished in 4 weeks:              8. FSPCPPW will improve FOTO score to 65 points to demonstrate functional improvement. Met improved to 78 12-              2. Patient will report a 50% increase in overall improvement since start of care in order to improve quality of life. Not met, no improvement with bending knee but pt also reports no problem with ADLs/amb and stair 2-              3. Patient will demonstrate knee flexion ROM of at least 105 deg and knee extension ROM of 0 deg to improve gait mechanics.  PROM 96 deg flexion 12/11/20              4. Patient will improve hip ext MMT to at least 4/5 to increase ease with functional activities.  Met 12-    PLAN  [x]  Upgrade activities as tolerated     [x]  Continue plan of care  []  Update interventions per flow sheet       []  Discharge due to:_  []  Other:_      Rc Maldonado, PT 12/17/2020  8:07 AM    Future Appointments   Date Time Provider Davina King   12/17/2020  2:15 PM Stephane BRICENOOVIL [facility]   12/22/2020  2:15 PM Stephane Urbina [facility]   12/24/2020 10:30 AM Lilian Mackey PT [facility]   12/29/2020  2:15 PM Tc Coombs, PT [id number]   12/31/2020  2:15 PM Tc Coombs, PT [id number]   1/4/2021  2:15 PM Nito Stern, PT [facility]   1/7/2021  2:15 PM Nito Stern, PT [facility]   1/14/2021  2:15 PM Nito Stern, PT [facility]

## 2020-12-22 ENCOUNTER — HOSPITAL ENCOUNTER (OUTPATIENT)
Dept: PHYSICAL THERAPY | Age: 85
End: 2020-12-22
Payer: MEDICARE

## 2020-12-24 ENCOUNTER — APPOINTMENT (OUTPATIENT)
Dept: PHYSICAL THERAPY | Age: 85
End: 2020-12-24
Payer: MEDICARE

## 2020-12-24 NOTE — PROGRESS NOTES
[facility] Esteban Lennox  [address] [facility]  [phone] fax    Physical Therapy Discharge Summary    Patient name: Sandra Bullock Start of Care: 2020   Referral source: Asa Lawrence : 10/8/1930   Medical/Treatment Diagnosis: Right knee pain [M25.561]  Payor: [employer] / Plan: [facility] MEDICARE CHOICE PPO/PFFS / Product Type: Managed Care Medicare /  Onset Date:10/13/2020     Prior Hospitalization: see medical history Provider#: [id number]   Medications: Verified on Patient Summary List    Comorbidities: COPD, cellulitis following surgery  Prior Level of Function:COPD, cellulitis following surgery    Visits from Start of Care: 6    Missed Visits: 1    Reporting Period : 20 to 20    Summary of Care:  Goal: Patient will report a 50% increase in overall improvement since start of care in order to improve quality of life.   Status at last note/certification: Not met, no improvement with bending knee but pt also reports no problem with ADLs/amb and stairs (20)  Status at discharge: Unable to formally assess-physician recommending surgical intervention at this time    Goal: Patient will demonstrate knee flexion ROM of at least 105 deg and knee extension ROM of 0 deg to improve gait mechanics. Status at last note/certification: PROM 96 deg flexion 20; AROM: -7 to 90 degrees after manual 2020  Status at discharge: Unable to formally assess-physician recommending surgical intervention at this time    Goal: Patient will be independent with HEP to improve ease with amb and bending his knee for ADLs performance. Status at last note/certification: MET  Status at discharge: MET    ASSESSMENT/RECOMMENDATIONS: Patient's last skilled outpatient PT visit was date of last progress note (20). He reports no difficulty with ambulation, stair navigation, and driving.  Continued subjective reports of and objective measurements for limited right knee ROM (AROM -7 to 90 deg). He also reports decreased tolerance to \"knee brace\" when riding bike at home. Patient is appropriate for discharge from skilled outpatient PT at this time due to physician recommendation for additional surgical intervention.     [x]Discontinue therapy: []Patient has reached or is progressing toward set goals      []Patient is non-compliant or has abdicated      []Due to lack of appreciable progress towards set goals    [x] Physician recommending surgical intervention    Xochitl Villagran, PT 12/24/2020 11:54 AM

## 2020-12-29 ENCOUNTER — APPOINTMENT (OUTPATIENT)
Dept: PHYSICAL THERAPY | Age: 85
End: 2020-12-29
Payer: MEDICARE

## 2020-12-31 ENCOUNTER — APPOINTMENT (OUTPATIENT)
Dept: PHYSICAL THERAPY | Age: 85
End: 2020-12-31
Payer: MEDICARE

## 2021-01-04 ENCOUNTER — APPOINTMENT (OUTPATIENT)
Dept: PHYSICAL THERAPY | Age: 86
End: 2021-01-04
Payer: MEDICARE

## 2021-01-06 ENCOUNTER — HOSPITAL ENCOUNTER (OUTPATIENT)
Dept: PHYSICAL THERAPY | Age: 86
Discharge: HOME | End: 2021-01-06
Payer: MEDICARE

## 2021-01-06 PROCEDURE — 97162 PT EVAL MOD COMPLEX 30 MIN: CPT

## 2021-01-06 PROCEDURE — 97110 THERAPEUTIC EXERCISES: CPT

## 2021-01-06 NOTE — PROGRESS NOTES
PT DAILY TREATMENT NOTE/KNEE EVAL     Patient Name: Jonathan Cabrales  Date:2021  : 10/8/1930  [x]  Patient  Verified  Payor: Gemma Packer / Plan: [facility] MEDICARE CHOICE PPO/PFFS / Product Type: Managed Care Medicare /    In time: 8:15  Out time: 8:55  Total Treatment Time (min): 40  Visit #: 1 of 14    Medicare/BCBS Only   Total Timed Codes (min):  24 1:1 Treatment Time:  40     Treatment Area: Ankylosis, right knee [M24.661]    SUBJECTIVE  Pain Level (0-10 scale):  2/10  []constant []intermittent []improving []worsening []no change since onset    Any medication changes, allergies to medications, adverse drug reactions, diagnosis change, or new procedure performed?: [x] No    [] Yes (see summary sheet for update)  Subjective functional status/changes:       Mechanism of Injury: right TKA, Right knee manipulation 20. Doesn't use a cane. Has been working on bending his knee. Difficulty with walking.        OBJECTIVE/EXAMINATION      24 min Therapeutic Exercise:  [x] See flow sheet : reviewed HEP   Rationale: increase ROM and increase strength to improve the patients ability to increase ease of ambulation           With   [x] TE   [] TA   [] neuro   [] other: Patient Education: [x] Review HEP    [] Progressed/Changed HEP based on:   [] positioning   [] body mechanics   [] transfers   [] heat/ice application    [] other:      Physical Therapy Evaluation - Knee    Gait:  [] Normal    [x] Abnormal    [x] Antalgic    [] NWB    Device:    Describe: decreased weight shift to right side, decreased knee extension and flexion during ambulation     ROM / Strength  [] Unable to assess                  AROM                      PROM                   Strength (1-5)    Left Right Left Right Left Right   Hip Flexion     4+ 4+    Extension          Abduction          Adduction         Knee Flexion 106 90   5 4+    Extension 2 7   5 4+   Ankle Plantarflexion     5 5    Dorsiflexion     5 5 Flexibility: [] Unable to assess at this time  Hamstrings:    (L) Tightness= [] WNL   [] Min   [] Mod   [] Severe    (R) Tightness= [] WNL   [] Min   [x] Mod   [] Severe  Quadriceps:    (L) Tightness= [] WNL   [] Min   [] Mod   [] Severe    (R) Tightness= [] WNL   [] Min   [x] Mod   [] Severe  Gastroc:      (L) Tightness= [] WNL   [] Min   [] Mod   [] Severe    (R) Tightness= [] WNL   [] Min   [] Mod   [] Severe  Other:    Palpation: no tenderness to palpation    Neg/Pos  Neg/Pos  Neg/Pos   Joint Line  Quad tendon  Patellar ligament    Patella  Fibular head  Pes Anserinus    Tibial tubercle  Hamstring tendons  Infrapatellar fat pad      Optional Tests:  Patellar Positioning (Static)   []L []R Normal []L []R Lateral   []L []R Lenward Morelle      []L []R Medial   []L []R [place]    Patellar Tracking   []L []R Glide (Lat)   []L []R Tilt (Lat)     []L []R Glide (Med)  []L []R Tilt (Med)      []L []R Tile (Inf)     Patellar Mobility   []L []R Hypermobile []L [x]R Hypomobile            Pain Level (0-10 scale) post treatment: 2/10    ASSESSMENT/Changes in Function:      [x]  See Plan of Care  []  See progress note/recertification  []  See Discharge Summary         Progress towards goals / Updated goals:  See POC    PLAN  []  Upgrade activities as tolerated     [x]  Continue plan of care  []  Update interventions per flow sheet       []  Discharge due to:_  []  Other:_      Ed Ramírez, PT 1/6/2021  8:21 AM

## 2021-01-06 NOTE — PROGRESS NOTES
[facility]  [place] [employer]  [address] [facility]  [phone] fax    Plan of Care/ Statement of Necessity for Physical Therapy Services    Patient name: Georges Scales Start of Care: 2021   Referral source: Asa Crabtree : 10/8/1930    Medical Diagnosis: Ankylosis, right knee [M24.661]  Payor: HUMANA MEDICARE / Plan: [facility] HUMANA MEDICARE CHOICE PPO/PFFS / Product Type: Managed Care Medicare /  Onset Date:20    Treatment Diagnosis:  Right knee pain   Prior Hospitalization: see medical history Provider#: [id number]   Medications: Verified on Patient summary List    Comorbidities COPD, cellulitis following surgery   Prior Level of Function: Ind with ambulation, Ind with ADLs      The Plan of Care and following information is based on the information from the initial evaluation. Assessment/ key information: pt. Is a 80year old male s/p right knee manipulation on 20. Right TKA was on 10/13/20 and he had cellulitis following surgery. He requires UE support to perform sit to stand transfers. Right knee AROM 7-90 degrees. He also has decreased right knee MMT ext: 4+/5 flex: 4+/5. He has firm end feel at end ranges of knee PROM. Skilled PT is medically necessary in order to improve LE mobility and strength for increased ease of ambulation and improved quality of life. Evaluation Complexity History MEDIUM  Complexity : 1-2 comorbidities / personal factors will impact the outcome/ POC ; Examination MEDIUM Complexity : 3 Standardized tests and measures addressing body structure, function, activity limitation and / or participation in recreation  ;Presentation MEDIUM Complexity : Evolving with changing characteristics  ; Clinical Decision Making MEDIUM Complexity : FOTO score of 26-74  Overall Complexity Rating: MEDIUM  Problem List: pain affecting function, decrease ROM, decrease strength, impaired gait/ balance, decrease ADL/ functional abilitiies, decrease activity tolerance, decrease flexibility/ joint mobility and decrease transfer abilities   Treatment Plan may include any combination of the following: Therapeutic exercise, Therapeutic activities, Neuromuscular re-education, Physical agent/modality, Gait/balance training, Manual therapy, Patient education, Self Care training, Functional mobility training and Home safety training  Patient / Family readiness to learn indicated by: asking questions  Persons(s) to be included in education: patient (P)  Barriers to Learning/Limitations: None  Patient Goal (s): to have less pain  Patient Self Reported Health Status: fair  Rehabilitation Potential: good    Short Term Goals: To be accomplished in 1 weeks:  1. Patient will demonstrate compliance with HEP in order to improve right knee mobility for increased ease of ambulation     Long Term Goals: To be accomplished in 14 visits  1. Patient will improve right knee AROM: 2-105 degrees in order to increase ease of ambulation. 2. Patient will improve right knee MMT to 5/5 in order to increase ease of ADLs. 3. Patient will perform sit to stand transfer without UE support in order to increase ease of transfers at home. Frequency / Duration: Patient to be seen 5 times a week for 1 week and then 3x a week for 4 weeks. .    Patient/ Caregiver education and instruction: Diagnosis, prognosis, exercises   [x]  Plan of care has been reviewed with PTA    Certification Period: 1/6/21-2/5/21    Nolia Buerger, PT 1/6/2021 10:38 AM    ________________________________________________________________________    I certify that the above Therapy Services are being furnished while the patient is under my care. I agree with the treatment plan and certify that this therapy is necessary.     [de-identified] Signature:____________Date:_________TIME:________    [employer], Date and Time must be completed for valid certification **    Please sign and return to [facility]  [employer] [employer] [facility] [employer]  [address] [facility]  [phone] fax

## 2021-01-07 ENCOUNTER — APPOINTMENT (OUTPATIENT)
Dept: PHYSICAL THERAPY | Age: 86
End: 2021-01-07
Payer: MEDICARE

## 2021-01-07 ENCOUNTER — HOSPITAL ENCOUNTER (OUTPATIENT)
Dept: PHYSICAL THERAPY | Age: 86
Discharge: HOME | End: 2021-01-07
Payer: MEDICARE

## 2021-01-07 PROCEDURE — 97140 MANUAL THERAPY 1/> REGIONS: CPT

## 2021-01-07 PROCEDURE — 97110 THERAPEUTIC EXERCISES: CPT

## 2021-01-07 NOTE — PROGRESS NOTES
PT DAILY TREATMENT NOTE     Patient Name: Benjamín Beyer  Date:2021  : 10/8/1930  [x]  Patient  Verified  Payor: Jess Hernandez / Plan: [facility] HUMANA MEDICARE CHOICE PPO/PFFS / Product Type: Managed Care Medicare /    In time:4:30  Out time: 5:14  Total Treatment Time (min): 44  Visit #: 2 of 14    Medicare/BCBS Only   Total Timed Codes (min):  44 1:1 Treatment Time:  44       Treatment Area: Ankylosis, right knee [M24.661]    SUBJECTIVE  Pain Level (0-10 scale):  2/10  Any medication changes, allergies to medications, adverse drug reactions, diagnosis change, or new procedure performed?: [x] No    [] Yes (see summary sheet for update)  Subjective functional status/changes:   [] No changes reported  Pt. Reports he has been working on his exercises at home. He has been working on [employer]. OBJECTIVE    36 min Therapeutic Exercise:  [x] See flow sheet :   Rationale: increase ROM and increase strength to improve the patients ability to increase ease of ambulation     8 min Manual Therapy:  Post tib mobs grade 4, tib distraction    The manual therapy interventions were performed at a separate and distinct time from the therapeutic activities interventions. Rationale: decrease pain, increase ROM and increase tissue extensibility to increase ease of ambulation           With   [x] TE   [] TA   [] neuro   [] other: Patient Education: [x] Review HEP    [] Progressed/Changed HEP based on:   [] positioning   [] body mechanics   [] transfers   [] heat/ice application    [] other:      Other Objective/Functional Measures:   Right knee AROM: 6-92 degrees flexion during PROM: 96 degrees  Pt. Tolerated PT well with no reports of increased pain  He has increased ease of sit to stand transfers with bending right LE back more  No difficulty with 50# resistance during leg press     Pain Level (0-10 scale) post treatment: 0/10    ASSESSMENT/Changes in Function:  Pt. Initiated PT and is compliant with his HEP.  He continues to demonstrate decreased right knee AROM. Patient will continue to benefit from skilled PT services to modify and progress therapeutic interventions, address functional mobility deficits, address ROM deficits, address strength deficits, analyze and address soft tissue restrictions, analyze and cue movement patterns, analyze and modify body mechanics/ergonomics and assess and modify postural abnormalities to attain remaining goals. Progress towards goals / Updated goals:  Short Term Goals: To be accomplished in 1 weeks:  1. Patient will demonstrate compliance with HEP in order to improve right knee mobility for increased ease of ambulation   Met (1/7/21)     Long Term Goals: To be accomplished in 14 visits  1. Patient will improve right knee AROM: 2-105 degrees in order to increase ease of ambulation. Not met: 6-92 degrees (1/7/21)  2. Patient will improve right knee MMT to 5/5 in order to increase ease of ADLs. 3. Patient will perform sit to stand transfer without UE support in order to increase ease of transfers at home.      PLAN  []  Upgrade activities as tolerated     [x]  Continue plan of care  []  Update interventions per flow sheet       []  Discharge due to:_  []  Other:_      Erickson Vu PT 1/7/2021  8:58 AM    Future Appointments   Date Time Provider Davina King   1/7/2021  4:30 PM Michelet Bedoya, PT [facility]   1/8/2021  3:00 PM Tc Syed, PT [facility]   1/11/2021  4:30 PM Michelet Bedoya, PT [facility]   1/12/2021  3:00 PM Tc Syed, PT [id number]

## 2021-01-08 ENCOUNTER — HOSPITAL ENCOUNTER (OUTPATIENT)
Dept: PHYSICAL THERAPY | Age: 86
Discharge: HOME | End: 2021-01-08
Payer: MEDICARE

## 2021-01-08 PROCEDURE — 97110 THERAPEUTIC EXERCISES: CPT

## 2021-01-08 PROCEDURE — 97140 MANUAL THERAPY 1/> REGIONS: CPT

## 2021-01-08 NOTE — PROGRESS NOTES
PT DAILY TREATMENT NOTE     Patient Name: Urvashi Jane  Date:2021  : 10/8/1930  [x]  Patient  Verified  Payor: Kassy Velasco / Plan: [facility] MEDICARE CHOICE PPO/PFFS / Product Type: Managed Care Medicare /    In time:3:00P  Out time:3:47P  Total Treatment Time (min): 47  Visit #: 3 of 14    Medicare/BCBS Only   Total Timed Codes (min):  47 1:1 Treatment Time:  47       Treatment Area: Ankylosis, right knee [M24.661]    SUBJECTIVE  Pain Level (0-10 scale): 2/10  Any medication changes, allergies to medications, adverse drug reactions, diagnosis change, or new procedure performed?: [x] No    [] Yes (see summary sheet for update)  Subjective functional status/changes:   [] No changes reported    Patient reports he has performing his HEP. He reports he felt his knee was moving better after therapy yesterday but it feels stiffer than is has since surgery. OBJECTIVE        35 min Therapeutic Exercise:  [x] See flow sheet :   Rationale: increase ROM and increase strength to improve the patients ability to ambulate with increased ease        12 min Manual Therapy:  Tibial distraction with flexion, manual stretching into extension   The manual therapy interventions were performed at a separate and distinct time from the therapeutic activities interventions.   Rationale: increase ROM and increase tissue extensibility to ambulate with increased ease          With   [] TE   [x] TA   [] neuro   [] other: Patient Education: [x] Review HEP    [x] Progressed/Changed HEP based on:   [] positioning   [] body mechanics   [] transfers   [] heat/ice application    [] other:      Other Objective/Functional Measures:     Decreased tolerance with sit to stand transfers, increased time to begin ambulating due to right knee stiffness  Patient challenged with sit to stands from 20'' table height with right foot slightly posterior, improved with feet even    Pain Level (0-10 scale) post treatment: 0/10    ASSESSMENT/Changes in Function: Patient educated on performing step lunge stretch at home with box/step/stool at kitchen counter rather than at stair holding onto wall for safety. Patient knee ROM improving slowly. Positive subjective response to \"tricky toe taps\" and incline stretch on step. Patient will continue to benefit from skilled PT services to modify and progress therapeutic interventions, address functional mobility deficits, address ROM deficits, address strength deficits, analyze and address soft tissue restrictions, analyze and cue movement patterns, analyze and modify body mechanics/ergonomics, assess and modify postural abnormalities, address imbalance/dizziness and instruct in home and community integration to attain remaining goals. Progress towards goals / Updated goals:  Short Term Goals: To be accomplished in 1 weeks:  1. Patient will demonstrate compliance with HEP in order to improve right knee mobility for increased ease of ambulation   Met (1/7/21)     Long Term Goals: To be accomplished in 14 visits  1. Patient will improve right knee AROM: 2-105 degrees in order to increase ease of ambulation. Not met: 6-92 degrees (1/7/21)  2. Patient will improve right knee MMT to 5/5 in order to increase ease of ADLs.    3. Patient will perform sit to stand transfer without UE support in order to increase ease of transfers at home.     PLAN  [x]  Upgrade activities as tolerated     [x]  Continue plan of care  []  Update interventions per flow sheet       []  Discharge due to:_  []  Other:_      Linn Reza, PT 1/8/2021  3:12 PM    Future Appointments   Date Time Provider Davina Knig   1/11/2021  4:30 PM Fernando Martinez, PT [facility]   1/12/2021  3:00 PM Tc Valle, PT [facility]

## 2021-01-11 ENCOUNTER — HOSPITAL ENCOUNTER (OUTPATIENT)
Dept: PHYSICAL THERAPY | Age: 86
Discharge: HOME | End: 2021-01-11
Payer: MEDICARE

## 2021-01-11 PROCEDURE — 97110 THERAPEUTIC EXERCISES: CPT

## 2021-01-11 PROCEDURE — 97140 MANUAL THERAPY 1/> REGIONS: CPT

## 2021-01-11 NOTE — PROGRESS NOTES
PT DAILY TREATMENT NOTE     Patient Name: Cris Julian  Date:2021  : 10/8/1930  [x]  Patient  Verified  Payor: Ekta Henriquez / Plan: [facility] MEDICARE CHOICE PPO/PFFS / Product Type: Managed Care Medicare /    In time: 4:30  Out time: 5:15  Total Treatment Time (min): 45  Visit #: 4 of 14    Medicare/BCBS Only   Total Timed Codes (min):  45 1:1 Treatment Time:  45       Treatment Area: Ankylosis, right knee [M24.661]    SUBJECTIVE  Pain Level (0-10 scale): 2/10  Any medication changes, allergies to medications, adverse drug reactions, diagnosis change, or new procedure performed?: [x] No    [] Yes (see summary sheet for update)  Subjective functional status/changes:   [] No changes reported  Pt. Reports he feels like his knee is still stiff. OBJECTIVE    30 min Therapeutic Exercise:  [x] See flow sheet :   Rationale: increase ROM and increase strength to improve the patients ability to increase ease of ambulation     15 min Manual Therapy:  Post tib mobs grade 4, tib distraction, patella mobs, quad contract relax    The manual therapy interventions were performed at a separate and distinct time from the therapeutic activities interventions. Rationale: decrease pain, increase ROM and increase tissue extensibility to increase ease of transfers           With   [x] TE   [] TA   [] neuro   [] other: Patient Education: [x] Review HEP    [] Progressed/Changed HEP based on:   [] positioning   [] body mechanics   [] transfers   [] heat/ice application    [] other:      Other Objective/Functional Measures:   Right knee flexion AROM: 92 degrees PROM: 97 degrees   After manual: AROM: 100 degrees PROM: 105 degrees  Pt. Was challenged with sit to stand transfers with right foot in front    Pain Level (0-10 scale) post treatment: 0/10    ASSESSMENT/Changes in Function:  Pt. Is progressing slowly with physical therapy.  He continues to have complaints of right knee stiffness but mobility is improving after manual interventions. He also has increased ease of sit to stand transfers at home. Firm end feel with end range PROM. Patient will continue to benefit from skilled PT services to modify and progress therapeutic interventions, address functional mobility deficits, address ROM deficits, address strength deficits, analyze and address soft tissue restrictions, analyze and cue movement patterns, analyze and modify body mechanics/ergonomics and assess and modify postural abnormalities to attain remaining goals. Progress towards goals / Updated goals:  Short Term Goals: To be accomplished in 1 weeks:  1. Patient will demonstrate compliance with HEP in order to improve right knee mobility for increased ease of ambulation   Met (1/7/21)     Long Term Goals: To be accomplished in 14 visits  1. Patient will improve right knee AROM: 2-105 degrees in order to increase ease of ambulation.   Not met: 6-92 degrees (1/7/21)  2. Patient will improve right knee MMT to 5/5 in order to increase ease of ADLs.    3. Patient will perform sit to stand transfer without UE support in order to increase ease of transfers at home.     PLAN  []  Upgrade activities as tolerated     [x]  Continue plan of care  []  Update interventions per flow sheet       []  Discharge due to:_  []  Other:_      Figueroa Jacobson, PT 1/11/2021  7:54 AM    Future Appointments   Date Time Provider Davina King   1/11/2021  4:30 PM Adriana Quinones, PT [facility]   1/12/2021  3:00 PM Tc Molina, PT [facility]

## 2021-01-12 ENCOUNTER — HOSPITAL ENCOUNTER (OUTPATIENT)
Dept: PHYSICAL THERAPY | Age: 86
Discharge: HOME | End: 2021-01-12
Payer: MEDICARE

## 2021-01-12 PROCEDURE — 97110 THERAPEUTIC EXERCISES: CPT

## 2021-01-12 PROCEDURE — 97140 MANUAL THERAPY 1/> REGIONS: CPT

## 2021-01-12 NOTE — PROGRESS NOTES
PT DAILY TREATMENT NOTE     Patient Name: Porter Dixon  Date:2021  : 10/8/1930  [x]  Patient  Verified  Payor: Meeta Escobar / Plan: [facility] [employer] MEDICARE CHOICE PPO/PFFS / Product Type: Managed Care Medicare /    In time:3:00P  Out time:3:43P  Total Treatment Time (min): 43  Visit #: 5 of 14    Medicare/BCBS Only   Total Timed Codes (min):  43 1:1 Treatment Time:  43       Treatment Area: Ankylosis, right knee [M24.661]    SUBJECTIVE  Pain Level (0-10 scale): 0/10  Any medication changes, allergies to medications, adverse drug reactions, diagnosis change, or new procedure performed?: [x] No    [] Yes (see summary sheet for update)  Subjective functional status/changes:   [] No changes reported    Patient reports he feels ok today. He reports he has been taking his pain medication as prescribed. OBJECTIVE        28 min Therapeutic Exercise:  [x] See flow sheet :   Rationale: increase ROM and increase strength to improve the patients ability to perform functional tasks with increased ease        15 min Manual Therapy:  Post tib mobs grade III/IV, tib distraction with flexion at EOB, patella mobs all directions grades III/IV   The manual therapy interventions were performed at a separate and distinct time from the therapeutic activities interventions.   Rationale: increase ROM and increase tissue extensibility to perform functional tasks with increased ease              With   [] TE   [] TA   [] neuro   [] other: Patient Education: [x] Review HEP    [] Progressed/Changed HEP based on:   [] positioning   [] body mechanics   [] transfers   [] heat/ice application    [] other:      Other Objective/Functional Measures:     Right patellar hypomobility: Min with medial glide, mod with inferior glide, max with superior and lateral glide  Right knee ext: 8 deg from neutral following manual  Increased weight with leg press      Pain Level (0-10 scale) post treatment: 0/10    ASSESSMENT/Changes in Function: Patient able to tolerate increased weight with leg press but reports he does feel a difference. Patient continues to deny pain with activity and denies feeling less stiffness in the knee. Knee flex and ext remains limited with PROM along with patellar mobility. Patient will continue to benefit from skilled PT services to modify and progress therapeutic interventions, address functional mobility deficits, address ROM deficits, address strength deficits, analyze and address soft tissue restrictions, analyze and cue movement patterns, analyze and modify body mechanics/ergonomics and address imbalance/dizziness to attain remaining goals. Progress towards goals / Updated goals:  Short Term Goals: To be accomplished in 1 weeks:  1. Patient will demonstrate compliance with HEP in order to improve right knee mobility for increased ease of ambulation   Met (1/7/21)     Long Term Goals: To be accomplished in 14 visits  1. Patient will improve right knee AROM: 2-105 degrees in order to increase ease of ambulation.   Not met: 6-92 degrees (1/7/21)  2. Patient will improve right knee MMT to 5/5 in order to increase ease of ADLs. 3. Patient will perform sit to stand transfer without UE support in order to increase ease of transfers at home.  Patient able to perform sit<>stand transfer pushing on knees for leverage. (1/12/21)     PLAN  [x]  Upgrade activities as tolerated     [x]  Continue plan of care  []  Update interventions per flow sheet       []  Discharge due to:_  []  Other:_      Uriel Leslie, PT 1/12/2021  2:29 PM    Future Appointments   Date Time Provider Davina King   1/12/2021  3:00 PM Tc Yi, PT [facility]   1/15/2021 10:00 AM Danya Delay, PT [facility]   1/20/2021 12:45 PM Danya Delay, PT [facility]   1/22/2021  3:00 PM Danya Delay, PT [facility]   1/25/2021  3:00 PM Danya Delay, PT [facility]   1/27/2021 10:30 AM Estrada Ruffing, PT [facility]   1/29/2021 12:00 PM Estrada Ruffing, PT [facility]   2/1/2021  4:30 PM Estrada Ruffing, PT [facility]   2/3/2021  1:30 PM Estrada Ruffing, PT [facility]   2/5/2021  2:15 PM Estrada Ruffing, PT [facility]   2/8/2021  2:15 PM Estrada Ruffing, PT [facility]   2/10/2021  3:00 PM Estrada Ruffing, PT [facility]   2/12/2021  1:30 PM Estrada Ruffing, PT [facility]

## 2021-01-14 ENCOUNTER — APPOINTMENT (OUTPATIENT)
Dept: PHYSICAL THERAPY | Age: 86
End: 2021-01-14
Payer: MEDICARE

## 2021-01-15 ENCOUNTER — HOSPITAL ENCOUNTER (OUTPATIENT)
Dept: PHYSICAL THERAPY | Age: 86
Discharge: HOME | End: 2021-01-15
Payer: MEDICARE

## 2021-01-15 PROCEDURE — 97140 MANUAL THERAPY 1/> REGIONS: CPT

## 2021-01-15 PROCEDURE — 97530 THERAPEUTIC ACTIVITIES: CPT

## 2021-01-15 PROCEDURE — 97110 THERAPEUTIC EXERCISES: CPT

## 2021-01-15 NOTE — PROGRESS NOTES
PT DAILY TREATMENT NOTE 1     Patient Name: Mac Howell  Date:1/15/2021  : 10/8/1930  [x]  Patient  Verified  Payor: Ailyn Gayle / Plan: [facility] [employer] MEDICARE CHOICE PPO/PFFS / Product Type: Managed Care Medicare /    In time:10:03  Out time:11:01  Total Treatment Time (min): 62  Visit #: 6 of 14    Medicare/BCBS Only   Total Timed Codes (min):  58 1:1 Treatment Time:  62       Treatment Area: Right knee pain [M25.561]    SUBJECTIVE  Pain Level (0-10 scale): 0/10  Any medication changes, allergies to medications, adverse drug reactions, diagnosis change, or new procedure performed?: [x] No    [] Yes (see summary sheet for update)  Subjective functional status/changes:   [] No changes reported  Pt reported he feels the same as he always does. OBJECTIVE    18 min Therapeutic Exercise:  [] See flow sheet : increase strength and firing of quad. Rationale: increase ROM and increase strength to improve the patients ability to carry out ADL's    10 min Therapeutic Activity:  []  See flow sheet : increase ROM and coordination on step. Rationale: increase ROM  to improve the patients ability to carry out ADL's      30 min Manual Therapy:  Increase knee ROM    The manual therapy interventions were performed at a separate and distinct time from the therapeutic activities interventions. Rationale: increase ROM and increase tissue extensibility to increase ability to sustain ROM gains achieved in therapy. With   [x] TE   [] TA   [] neuro   [] other: Patient Education: [x] Review HEP    [] Progressed/Changed HEP based on:   [x] positioning   [] body mechanics   [] transfers   [] heat/ice application    [] other:      Other Objective/Functional Measures:   COLD   R KNEE FLEXION ROM: AROM: 90, PROM 95   R KNEE EXTENSION ROM: AROM: 15, PROM 13    POST MOB. ( Anterior/ posterior tibial, superior/inferior patellar)     R KNEE FLEXION ROM: AROM:94, PROM: 100   R KNEE EXTENSION ROM: AROM: 12, PROM: 10  Contract relax stretching for Knee flexion off side of bed. Soft tissue mobilization did not increase ROM. Pain Level (0-10 scale) post treatment: 0/10    ASSESSMENT/Changes in Function:   Pt was pleasant and willing to work with us on this date. Pt stated his knee feels really tight as it always does. Visible pitting edema noted on RLE. Overall, pt gained slight increase in motion following mobilization. Pt responded well to exercise and ROM activities. Patient will continue to benefit from skilled PT services to modify and progress therapeutic interventions, address functional mobility deficits, address ROM deficits and address strength deficits to attain remaining goals. Progress towards goals / Updated goals:  Long Term Goals: To be accomplished in 14 visits  1. Patient will improve right knee AROM: 2-105 degrees in order to increase ease of ambulation.   Not met: 12-94 degrees (1/15/21)  2. Patient will improve right knee MMT to 5/5 in order to increase ease of ADLs. 3. Patient will perform sit to stand transfer without UE support in order to increase ease of transfers at home. Met, 12/15/21    PLAN  []  Upgrade activities as tolerated     [x]  Continue plan of care  []  Update interventions per flow sheet       []  Discharge due to:_  []  Other:_        Cyn Castillo 1/15/2021  10:07 AM    I was present during the entire treatment, directing and participating in the treatment.    Ricky Hinders DPT      Future Appointments   Date Time Provider Davina King   1/19/2021 12:00 PM Tc Kumar [place], [place] [facility]   1/22/2021  3:00 PM Luis Tavares PT [facility]   1/25/2021  3:00 PM Luis Tavares PT [facility]   1/27/2021 10:30 AM Luis Tavares PT [facility]   1/29/2021 12:00 PM Luis Tavares PT [facility]   2/1/2021  4:30 PM Luis Tavares PT [facility]   2/3/2021  1:30 PM Luis Tavares, PT [facility]   2/5/2021  2:15 PM Ajay Christianson, PT [facility]   2/8/2021  2:15 PM Ajay Christianson, PT [facility]   2/10/2021  3:00 PM Ajay Christianson, PT [facility]   2/12/2021  1:30 PM Ajay Christianson, PT [facility]

## 2021-01-19 ENCOUNTER — HOSPITAL ENCOUNTER (OUTPATIENT)
Dept: PHYSICAL THERAPY | Age: 86
Discharge: HOME | End: 2021-01-19
Payer: MEDICARE

## 2021-01-19 PROCEDURE — 97140 MANUAL THERAPY 1/> REGIONS: CPT

## 2021-01-19 PROCEDURE — 97110 THERAPEUTIC EXERCISES: CPT

## 2021-01-19 NOTE — PROGRESS NOTES
PT DAILY TREATMENT NOTE     Patient Name: Sandip Epstein  Date:2021  : 10/8/1930  [x]  Patient  Verified  Payor: Chema  / Plan: [facility] [employer] MEDICARE CHOICE PPO/PFFS / Product Type: Managed Care Medicare /    In time:12:02P  Out time:12:41P  Total Treatment Time (min): 39  Visit #: 7 of 14    Medicare/BCBS Only   Total Timed Codes (min):  39 1:1 Treatment Time:  39       Treatment Area: Right knee pain [M25.561]    SUBJECTIVE  Pain Level (0-10 scale): 0/10  Any medication changes, allergies to medications, adverse drug reactions, diagnosis change, or new procedure performed?: [x] No    [] Yes (see summary sheet for update)  Subjective functional status/changes:   [] No changes reported    Patient reports he feels his knee is still tight. He does not notice much change whether he takes the prescription pain medication or not. He still has the most tightness when he rides his bike at home. He feels the stair stretch has been the most helpful. OBJECTIVE    14 min Therapeutic Exercise:  [x] See flow sheet :   Rationale: increase ROM and increase strength to improve the patients ability to perform functional tasks with increased ease        25 min Manual Therapy:  Post tib mobs grade III/IV, manual stretching into extension tib distraction with flexion at EOB, manual stretching into flexion at EOB, contract-relax to promote knee flex at EOB   The manual therapy interventions were performed at a separate and distinct time from the therapeutic activities interventions.   Rationale: decrease pain, increase ROM and increase tissue extensibility to perform functional tasks with increased ease              With   [x] TE   [] TA   [] neuro   [] other: Patient Education: [x] Review HEP    [] Progressed/Changed HEP based on:   [] positioning   [] body mechanics   [] transfers   [] heat/ice application    [x] other: patient education for signs/symptoms of DVT     Other Objective/Functional Measures: Mild increase in left knee discomfort reported with manual (worse with manual addressing flexion vs extension)   Pitting edema 2+ (4 sec to rebound) to right lateral calf    Pain Level (0-10 scale) post treatment: 0/10    ASSESSMENT/Changes in Function: Patient continues to be limited with AROM with improvement following manual. Patient's greatest complaint continues to be \"tightness\" of right knee despite interventions. Patient educated to continue to monitor swelling/redness of right calf and to be aware of any changes in these symptoms or warmth. Patient will continue to benefit from skilled PT services to modify and progress therapeutic interventions, address functional mobility deficits, address ROM deficits, address strength deficits, analyze and address soft tissue restrictions, analyze and cue movement patterns, analyze and modify body mechanics/ergonomics and instruct in home and community integration to attain remaining goals. Progress towards goals / Updated goals:  Long Term Goals: To be accomplished in 14 visits  1. Patient will improve right knee AROM: 2-105 degrees in order to increase ease of ambulation.   Not met: 12-94 degrees (1/15/21)  2. Patient will improve right knee MMT to 5/5 in order to increase ease of ADLs.    3. Patient will perform sit to stand transfer without UE support in order to increase ease of transfers at home.   Met, 1/15/21    PLAN  [x]  Upgrade activities as tolerated     [x]  Continue plan of care  []  Update interventions per flow sheet       []  Discharge due to:_  []  Other:_      S[place], PT 1/19/2021  10:24 AM    Future Appointments   Date Time Provider Davina King   1/19/2021 12:00 PM Tc Tello [place], [place] [facility] [address]   1/22/2021  3:00 PM Eduardo Hinojosa [facility] [address]   1/25/2021  3:00 PM Valentin Carrero PT [facility] [address]   1/27/2021 10:30 AM Valentin Carrero PT [facility] [address]   1/29/2021 12:00 PM Lilia Ilda Hicks [address]   2/1/2021  4:30 PM Alberta Jonh, PT [facility] [address]   2/3/2021  1:30 PM Alberta Jonh, PT [facility] [address]   2/5/2021  2:15 PM Alberta Jonh, PT [facility] [address]   2/8/2021  2:15 PM Alberta Jonh, PT [facility] [address]   2/10/2021  3:00 PM Alberta Jonh, PT [facility] [address]   2/12/2021  1:30 PM Alberta Jonh, PT [facility] [address]

## 2021-01-20 ENCOUNTER — APPOINTMENT (OUTPATIENT)
Dept: PHYSICAL THERAPY | Age: 86
End: 2021-01-20
Payer: MEDICARE

## 2021-01-22 ENCOUNTER — HOSPITAL ENCOUNTER (OUTPATIENT)
Dept: PHYSICAL THERAPY | Age: 86
Discharge: HOME | End: 2021-01-22
Payer: MEDICARE

## 2021-01-22 PROCEDURE — 97140 MANUAL THERAPY 1/> REGIONS: CPT

## 2021-01-22 PROCEDURE — 97110 THERAPEUTIC EXERCISES: CPT

## 2021-01-22 NOTE — PROGRESS NOTES
PT DAILY TREATMENT NOTE     Patient Name: Neva Olvera  Date:2021  : 10/8/1930  [x]  Patient  Verified  Payor: Stephon Duran / Plan: [facility] MEDICARE CHOICE PPO/PFFS / Product Type: Managed Care Medicare /    In time: 3:00  Out time:3:47  Total Treatment Time (min): 47  Visit #: 8 of 14    Medicare/BCBS Only   Total Timed Codes (min):  47 1:1 Treatment Time:  42       Treatment Area: Right knee pain [M25.561]    SUBJECTIVE  Pain Level (0-10 scale): 0/10  Any medication changes, allergies to medications, adverse drug reactions, diagnosis change, or new procedure performed?: [x] No    [] Yes (see summary sheet for update)  Subjective functional status/changes:   [] No changes reported  Pt reports being able to tolerate dynasplint a little more, \"I got to #4 but I can't do an hour though. \" He feels like his knee's getting slowly better. OBJECTIVE    15 min Therapeutic Exercise:  [x] See flow sheet :   Rationale: increase ROM and increase strength to improve the patients ability to amb with more ease    32 min Manual Therapy:  Patella sup/inf mobs grade III-IV at end range of ext and flex; Post tib mobs grade III-IV, contract-relax to improve knee flex in supine and in prone   The manual therapy interventions were performed at a separate and distinct time from the therapeutic activities interventions.   Rationale: decrease pain, increase ROM, increase tissue extensibility and decrease trigger points to improve pt's ease with amb and ADLs       With   [] TE   [] TA   [] neuro   [] other: Patient Education: [x] Review HEP    [] Progressed/Changed HEP based on:   [] positioning   [] body mechanics   [] transfers   [] heat/ice application    [] other:      Other Objective/Functional Measures:    Right knee AROM: -15 to 92 degrees; post manual: -7 to 98 degrees    Min swelling of Right LE   Good form with therex   Mod-max pain but pleased with manual    Pain Level (0-10 scale) post treatment: 0/10    ASSESSMENT/Changes in Function: Pt making slow progress with PT. AROM improved mod after manual but challenged with maintaining post manual ROM. Pt reports improved intensity with self stretching at home and demonstrates improve attitude towards his progression. Will cont with progressive manual and strengthening as tolerated. Patient will continue to benefit from skilled PT services to modify and progress therapeutic interventions, address functional mobility deficits, address ROM deficits, address strength deficits, analyze and address soft tissue restrictions, analyze and cue movement patterns, analyze and modify body mechanics/ergonomics, assess and modify postural abnormalities, address imbalance/dizziness and instruct in home and community integration to attain remaining goals. []  See Plan of Care  [x]  See progress note/recertification  []  See Discharge Summary         Progress towards goals / Updated goals:  Long Term Goals: To be accomplished in 14 visits  1. Patient will improve right knee AROM: 2-105 degrees in order to increase ease of ambulation.   Not met: 12-94 degrees (1/15/21)  2. Patient will improve right knee MMT to 5/5 in order to increase ease of ADLs.    3. Patient will perform sit to stand transfer without UE support in order to increase ease of transfers at home. Met, 1/15/21    PLAN  [x]  Upgrade activities as tolerated     [x]  Continue plan of care  []  Update interventions per flow sheet       []  Discharge due to:_  []  Other:_      Gini Ko, PT 1/22/2021  8:04 AM    Future Appointments   Date Time Provider Davina King   1/22/2021  3:00 PM Kassidy Marshall [facility]   1/25/2021  3:00 PM Estrada Richardson, PT [facility]   1/27/2021 10:30 AM Tc Alfredo, PT [facility]   1/29/2021 12:00 PM Estrada Richardson, PT [facility]   2/1/2021  4:30 PM Estrada Richardson, PT [facility]   2/3/2021  1:30 PM Estrada Richardson, PT [facility] 2/5/2021  2:15 PM Gm Davial, PT [facility]   2/8/2021  2:15 PM Gm Davila, PT [facility]   2/10/2021  3:00 PM Gm Davila, PT [facility]   2/12/2021  1:30 PM Gm Davila, PT [facility]

## 2021-01-25 ENCOUNTER — HOSPITAL ENCOUNTER (OUTPATIENT)
Dept: PHYSICAL THERAPY | Age: 86
Discharge: HOME | End: 2021-01-25
Payer: MEDICARE

## 2021-01-25 PROCEDURE — 97140 MANUAL THERAPY 1/> REGIONS: CPT

## 2021-01-25 PROCEDURE — 97110 THERAPEUTIC EXERCISES: CPT

## 2021-01-25 NOTE — PROGRESS NOTES
PT DAILY TREATMENT NOTE     Patient Name: Cris Julian  Date:2021  : 10/8/1930  [x]  Patient  Verified  Payor: Ekta Henriquez / Plan: [facility] MEDICARE CHOICE PPO/PFFS / Product Type: Managed Care Medicare /    In time:3:00  Out time:3:42  Total Treatment Time (min): 42  Visit #: 9 of 14    Medicare/BCBS Only   Total Timed Codes (min):  42 1:1 Treatment Time:  42       Treatment Area: Right knee pain [M25.561]    SUBJECTIVE  Pain Level (0-10 scale): 0/10  Any medication changes, allergies to medications, adverse drug reactions, diagnosis change, or new procedure performed?: [x] No    [] Yes (see summary sheet for update)  Subjective functional status/changes:   [] No changes reported  Pt says he feels great, knee is still tight. OBJECTIVE    30 min Therapeutic Exercise:  [x] See flow sheet : LAQ, squats, stretching. Rationale: increase ROM, increase strength and improve coordination to improve the patients ability to carry out ADL's    12 min Manual Therapy:  Patella and knee flexion/extension mob. The manual therapy interventions were performed at a separate and distinct time from the therapeutic activities interventions          With   [x] TE   [] TA   [] neuro   [] other: Patient Education: [x] Review HEP    [] Progressed/Changed HEP based on:   [] positioning   [] body mechanics   [] transfers   [] heat/ice application    [] other:      Other Objective/Functional Measures:   Right Knee: - PROM    Right Knee: 90* AAROM seated EOB  Pt responded well to contract relax stretching  Added TRX squats to target to load knee flexion      Pain Level (0-10 scale) post treatment: 0/10    ASSESSMENT/Changes in Function:   Pt was pleasant and showed great motivation to increase function on this date. Overall, pt knee ROM is making slow progress but is showing sings of improvement. Pt responds well to manual therapy and loaded activity to preserve ROM.  Pt stated ascending stairs has gotten easier but its still difficult descending. Patient will continue to benefit from skilled PT services to modify and progress therapeutic interventions, address functional mobility deficits, address ROM deficits and address strength deficits to attain remaining goals. Progress towards goals / Updated goals:  1. Patient will improve right knee AROM: 2-105 degrees in order to increase ease of ambulation.   Not met: - PROM (1/25/21)  Right knee: 90* AROM  2. Patient will improve right knee MMT to 5/5 in order to increase ease of ADLs. 3. Patient will perform sit to stand transfer without UE support in order to increase ease of transfers    MET (1/25/21)     PLAN  []  Upgrade activities as tolerated     [x]  Continue plan of care  []  Update interventions per flow sheet       []  Discharge due to:_  []  Other:_      Werner Hooker 1/25/2021  3:05 PM    I was present during the entire treatment, directing and participating in the treatment.    Melly Torres DPT      Future Appointments   Date Time Provider Davina King   1/27/2021 10:30 AM Tc Ward [place] [facility]   1/29/2021 12:00 PM Joana Dandy, PT [facility]   2/1/2021  4:30 PM Joana Dandy, PT [facility]   2/4/2021 10:30 AM Tc Ward [place] [facility]   2/5/2021  2:15 PM Joana Dandy, PT [facility]   2/8/2021  2:15 PM Joana Dandy, PT [facility]   2/10/2021  3:00 PM Joana Dandy, PT [facility]   2/12/2021  1:30 PM Joana Dandy, PT [facility]

## 2021-01-27 ENCOUNTER — HOSPITAL ENCOUNTER (OUTPATIENT)
Dept: PHYSICAL THERAPY | Age: 86
Discharge: HOME | End: 2021-01-27
Payer: MEDICARE

## 2021-01-27 PROCEDURE — 97110 THERAPEUTIC EXERCISES: CPT

## 2021-01-27 PROCEDURE — 97140 MANUAL THERAPY 1/> REGIONS: CPT

## 2021-01-27 NOTE — PROGRESS NOTES
[facility]  Rolene Plants  [address] [facility]  [phone] fax    Continued Plan of Care/ Re-certification for Physical Therapy Services    Patient name: Georges Scales Start of Care: 2021   Referral source: Francine Fontana [place] : 10/8/1930               Medical Diagnosis: Ankylosis, right knee [M24.661]  Payor: [employer] MEDICARE / Plan: [facility] [employer] MEDICARE CHOICE PPO/PFFS / Product Type: Managed Care Medicare /  Onset Date:20               Treatment Diagnosis:  Right knee pain   Prior Hospitalization: see medical history Provider#: [id number]   Medications: Verified on Patient summary List    Comorbidities COPD, cellulitis following surgery   Prior Level of Function: Ind with ambulation, Ind with ADLs    Visits from Start of Care: 10    Missed Visits: 0    The Plan of Care and following information is based on the patient's current status:  Goal: Patient will improve right knee AROM: 2-105 degrees in order to increase ease of ambulation. Status at last note/certification: 1-09 degrees  Current Status: not met    Goal:  Patient will improve right knee MMT to 5/5 in order to increase ease of ADLs. Status at last note/certification: 4+/5  Current Status: not met    Goal: Patient will perform sit to stand transfer without UE support in order to increase ease of transfers at home.    Status at last note/certification: required UE support  Current Status: met    Key functional changes: improved sit to stand transfers      Problems/ barriers to goal attainment: none     Problem List: pain affecting function, decrease ROM, decrease strength, edema affecting function, impaired gait/ balance, decrease ADL/ functional abilitiies, decrease activity tolerance, decrease flexibility/ joint mobility and decrease transfer abilities    Treatment Plan: Therapeutic exercise, Therapeutic activities, Neuromuscular re-education, Physical agent/modality, Gait/balance training, Manual therapy and Patient education     Patient Goal (s) has been updated and includes: to have bend knee better     Goals for this certification period to be accomplished in 4 weeks:  1. Patient will improve right knee AROM 2-105 degrees in order to increase ease of ambulation. 2. Patient will improve right knee MMT to 5/5 in order to increase ease of ADLs. 3. Patient will perform 8 sit to stand transfers in 30 seconds in order to demonstrate increased ease of transfers at home. Frequency / Duration: Patient to be seen 3 times per week for 4 weeks:    Assessment / Recommendations: pt. Is progressing slowly towards goals. He continues to have decreased right knee AROM into extension and flexion with reports of stiffness at end range. Right flexion AROM was 92 degrees. Right knee PROM has improved to 102 degrees following manual interventions. PROM has firm end feel at end ranges. He also continues to demonstrate decreased right knee strength. He has improved knee flexion during sit to stand transfers and is now able to stand without UE support. He continues to be compliant with his HEP. Skilled PT is medically necessary in order to improve right knee mobility and strength for increased ease of ambulation and improved quality of life. Certification Period: 1/27/21-2/26/21    Della Mills, PT 1/27/2021 11:13 AM    ________________________________________________________________________  I certify that the above Therapy Services are being furnished while the patient is under my care. I agree with the treatment plan and certify that this therapy is necessary. [] I have read the above and request that my patient continue as recommended.   [] I have read the above report and request that my patient continue therapy with the following changes/special instructions: _______________________________________  [] I have read the above report and request that my patient be discharged from therapy    Physician's Signature:____________Date:_________TIME:________    ** Signature, Date and Time must be completed for valid certification **    Please sign and return to [facility]  [facility]  [place]  [address] [place] [address]  [phone] fax

## 2021-01-27 NOTE — PROGRESS NOTES
PT DAILY TREATMENT NOTE     Patient Name: Porter Dixon  Date:2021  : 10/8/1930  [x]  Patient  Verified  Payor: Meeta Escobar / Plan: [facility] MEDICARE CHOICE PPO/PFFS / Product Type: Managed Care Medicare /    In time:10:30 AM  Out time:11:09 AM  Total Treatment Time (min): 39  Visit #: 10 of 14    Medicare/BCBS Only   Total Timed Codes (min):  39 1:1 Treatment Time:  39       Treatment Area: Right knee pain [M25.561]    SUBJECTIVE  Pain Level (0-10 scale): 0  Any medication changes, allergies to medications, adverse drug reactions, diagnosis change, or new procedure performed?: [x] No    [] Yes (see summary sheet for update)  Subjective functional status/changes:   [] No changes reported  \"It's still stiff. \"    OBJECTIVE          29 min Therapeutic Exercise:  [x] See flow sheet :   Rationale: increase ROM, increase strength and improve coordination to improve the patients ability to increase ease with ADLs      10 min Manual Therapy: In supine with HOB elevated: PROM to rgiht knee, popliteal release, patellar mobs (all planes)   The manual therapy interventions were performed at a separate and distinct time from the therapeutic activities interventions. Rationale: decrease pain, increase ROM and increase tissue extensibility to ease ADL tolerance              With   [] TE   [] TA   [] neuro   [] other: Patient Education: [x] Review HEP    [] Progressed/Changed HEP based on:   [] positioning   [] body mechanics   [] transfers   [] heat/ice application    [] other:      Other Objective/Functional Measures:   Right knee MMT: 4-/5 flexion, 4+/5 ext     Pain Level (0-10 scale) post treatment: 0    ASSESSMENT/Changes in Function: Patient making gradual progress towards. Firm end feel noted with manual. Extremely tight through hamstrings and popliteal area.      Patient will continue to benefit from skilled PT services to modify and progress therapeutic interventions, address functional mobility deficits, address ROM deficits, address strength deficits, analyze and address soft tissue restrictions, analyze and cue movement patterns, analyze and modify body mechanics/ergonomics, assess and modify postural abnormalities, address imbalance/dizziness and instruct in home and community integration to attain remaining goals. []  See Plan of Care  [x]  See progress note/recertification  []  See Discharge Summary         Progress towards goals / Updated goals:  1. Patient will improve right knee AROM: 2-105 degrees in order to increase ease of ambulation.   Not met: - PROM (1/25/21)  Right knee: 90* AROM  2. Patient will improve right knee MMT to 5/5 in order to increase ease of ADLs. Progressing,right knee flexion: 4-/5. Right knee ext: 4+/5 (1/27/21)   3.  Patient will perform sit to stand transfer without UE support in order to increase ease of transfers    MET (1/25/21)     PLAN  [x]  Upgrade activities as tolerated     [x]  Continue plan of care  []  Update interventions per flow sheet       []  Discharge due to:_  []  Other:_      Love aGrcia 1/27/2021  10:26 AM    Future Appointments   Date Time Provider Davina King   1/27/2021 10:30 AM Alyse aMrk [facility]   1/29/2021 12:00 PM Anne Garrard, PT [facility]   2/1/2021  4:30 PM Anne Garrard, PT [facility]   2/4/2021 10:30 AM Tc Griffith, PT [facility]   2/5/2021  2:15 PM Anne Garrard, PT [facility]   2/8/2021  2:15 PM Anne Garrard, PT [facility]   2/10/2021  3:00 PM Anne Garrard, PT [facility]   2/12/2021  1:30 PM Anne Garrard, PT [facility]

## 2021-01-29 ENCOUNTER — HOSPITAL ENCOUNTER (OUTPATIENT)
Dept: PHYSICAL THERAPY | Age: 86
Discharge: HOME | End: 2021-01-29
Payer: MEDICARE

## 2021-01-29 PROCEDURE — 97530 THERAPEUTIC ACTIVITIES: CPT

## 2021-01-29 PROCEDURE — 97110 THERAPEUTIC EXERCISES: CPT

## 2021-01-29 PROCEDURE — 97140 MANUAL THERAPY 1/> REGIONS: CPT

## 2021-01-29 NOTE — PROGRESS NOTES
PT DAILY TREATMENT NOTE     Patient Name: Hillary Burgos  Date:2021  : 10/8/1930  [x]  Patient  Verified  Payor: Bola Sunita / Plan: [facility] MEDICARE CHOICE PPO/PFFS / Product Type: Managed Care Medicare /    In time:11:59  Out time:12:46  Total Treatment Time (min): 52  Visit #: 1 of 12    Medicare/BCBS Only   Total Timed Codes (min):  47 1:1 Treatment Time:  52       Treatment Area: Right knee pain [M25.561]    SUBJECTIVE  Pain Level (0-10 scale): 0/10  Any medication changes, allergies to medications, adverse drug reactions, diagnosis change, or new procedure performed?: [x] No    [] Yes (see summary sheet for update)  Subjective functional status/changes:   [] No changes reported  Pt. Reports he still has difficulty going down stairs. OBJECTIVE       27 min Therapeutic Exercise:  [x] See flow sheet : Squats, lunges,    Rationale: increase ROM and increase strength to improve the patients ability to carry out ADL's    10 min Therapeutic Activity:  [x]  See flow sheet : eccentric box step ups. Rationale: increase ROM, improve coordination and improve balance  to improve the patients ability to carry out ADL's     10 min Manual Therapy:  Patella mobs, post tib mobs grade 4, quad contract relax   The manual therapy interventions were performed at a separate and distinct time from the therapeutic activities interventions. Rationale: increase ROM and increase tissue extensibility to increase ease of ambulation           With   [x] TE   [] TA   [] neuro   [] other: Patient Education: [x] Review HEP    [] Progressed/Changed HEP based on:   [x] positioning   [x] body mechanics   [] transfers   [] heat/ice application    [] other:      Other Objective/Functional Measures:   Right knee AROM before manual: 8-91 degrees   Added semi lunge to foam pad to increase quad strength and knee flexion ROM  Continued TRX squats, cueing for posterior lean   Eccentric box step ups.        Pain Level (0-10 scale) post treatment: 0/10    ASSESSMENT/Changes in Function:   Pt is progressing, reporting decreased tightness in the knee when doing ADL's. Has concerns with stairs, specifically descending. Worked on eccentric control. Patient will continue to benefit from skilled PT services to modify and progress therapeutic interventions, address functional mobility deficits, address ROM deficits and address strength deficits to attain remaining goals. Progress towards goals / Updated goals:  1. Patient will improve right knee AROM: 2-105 degrees in order to increase ease of ambulation.   Not met: 8-91 degrees (1/29/21)  2. Patient will improve right knee MMT to 5/5 in order to increase ease of ADLs. Progressing,right knee flexion: 4-/5. Right knee ext: 4+/5 (1/27/21)   3. Patient will perform sit to stand transfer without UE support in order to increase ease of transfers    MET (1/25/21)     PLAN  []  Upgrade activities as tolerated     [x]  Continue plan of care  []  Update interventions per flow sheet       []  Discharge due to:_  []  Other:_      Earney Spruce 1/29/2021  12:18 PM    I was present during the entire treatment, directing and participating in the treatment.    Harini Deleon DPT      Future Appointments   Date Time Provider Davina King   2/1/2021  4:30 PM Ethelene Ink, [facility]   2/4/2021 10:30 AM Tc Brooks, PT [facility]   2/5/2021  2:15 PM Ethelene Ink, PT [facility]   2/8/2021  2:15 PM Ethelene Ink, PT [facility]   2/10/2021  3:00 PM Ethelene Ink, PT [facility]   2/12/2021  1:30 PM Ethelene Ink, PT [facility]

## 2021-02-01 ENCOUNTER — HOSPITAL ENCOUNTER (OUTPATIENT)
Dept: PHYSICAL THERAPY | Age: 86
Discharge: HOME | End: 2021-02-01
Payer: MEDICARE

## 2021-02-01 PROCEDURE — 97110 THERAPEUTIC EXERCISES: CPT

## 2021-02-01 PROCEDURE — 97530 THERAPEUTIC ACTIVITIES: CPT

## 2021-02-01 PROCEDURE — 97140 MANUAL THERAPY 1/> REGIONS: CPT

## 2021-02-01 NOTE — PROGRESS NOTES
PT DAILY TREATMENT NOTE     Patient Name: Urvashi Jane  Date:2021  : 10/8/1930  [x]  Patient  Verified  Payor: Kassy Velasco / Plan: [facility] MEDICARE CHOICE PPO/PFFS / Product Type: Managed Care Medicare /    In time:3:26  Out time:5:09  Total Treatment Time (min): 43  Visit #: 2 of 12    Medicare/BCBS Only   Total Timed Codes (min):  43 1:1 Treatment Time:  43       Treatment Area: Right knee pain [M25.561]    SUBJECTIVE  Pain Level (0-10 scale): 0/10  Any medication changes, allergies to medications, adverse drug reactions, diagnosis change, or new procedure performed?: [x] No    [] Yes (see summary sheet for update)  Subjective functional status/changes:   [] No changes reported  Pt reported he feels the same as last time, knee is still tight. OBJECTIVE      23 min Therapeutic Exercise:  [] See flow sheet : hip and knee strengthening    Rationale: increase ROM and increase strength to improve the patients ability to carry out ADL's    10 min Therapeutic Activity:  []  See flow sheet : squats and lunges to promote knee ROM    Rationale: increase ROM and increase strength  to improve the patients ability to carry out ADL's    10 min Manual Therapy:  Patella mobs, post tib mobs grade 4, quad contract relax   The manual therapy interventions were performed at a separate and distinct time from the therapeutic activities interventions. Rationale: increase ROM and increase tissue extensibility to carry out ADL's          With   [] TE   [] TA   [] neuro   [] other: Patient Education: [x] Review HEP    [] Progressed/Changed HEP based on:   [] positioning   [] body mechanics   [] transfers   [] heat/ice application    [] other:      Other Objective/Functional Measures:   Walking in: Knee ROM: 10-95 AROM  100* knee flexion after exercise   Pt has not responded well to manual therapy, gained much more ROM following exercise.      Pain Level (0-10 scale) post treatment: 0/10    ASSESSMENT/Changes in Function:   Pt is slowly progressing toward therapy goals. Pt has responded better to exercise stretching rather than manual therapy techniques. Patient will continue to benefit from skilled PT services to modify and progress therapeutic interventions, address functional mobility deficits, address ROM deficits and address strength deficits to attain remaining goals. Progress towards goals / Updated goals:  1. Patient will improve right knee AROM: 2-105 degrees in order to increase ease of ambulation.   Not met: 8-91 degrees (1/29/21)    2. Patient will improve right knee MMT to 5/5 in order to increase ease of ADLs.  Progressing,right knee flexion: 4-/5. Right knee ext: 4+/5 (1/27/21)     3. Patient will perform sit to stand transfer without UE support in order to increase ease of transfers    MET (1/25/21)     PLAN  []  Upgrade activities as tolerated     [x]  Continue plan of care  []  Update interventions per flow sheet       []  Discharge due to:_  []  Other:_      Arely Phillips 2/1/2021  4:30 PM    I was present during the entire treatment, directing and participating in the treatment.    Jany Ruiz DPT      Future Appointments   Date Time Provider Davina King   2/4/2021 10:30 AM Tc [address], [place] [facility]   2/5/2021  2:15 PM Mango Xochilt, PT [facility]   2/8/2021  2:15 PM Mango Xochilt, PT [facility]   2/10/2021  3:00 PM Mango Xochilt, PT [facility]   2/12/2021  1:30 PM Mango Xochilt, PT [facility]

## 2021-02-04 ENCOUNTER — HOSPITAL ENCOUNTER (OUTPATIENT)
Dept: PHYSICAL THERAPY | Age: 86
Discharge: HOME | End: 2021-02-04
Payer: MEDICARE

## 2021-02-04 PROCEDURE — 97140 MANUAL THERAPY 1/> REGIONS: CPT

## 2021-02-04 PROCEDURE — 97110 THERAPEUTIC EXERCISES: CPT

## 2021-02-04 NOTE — PROGRESS NOTES
PT DAILY TREATMENT NOTE     Patient Name: Lorenzo Luis  Date:2021  : 10/8/1930  [x]  Patient  Verified  Payor: Karina Rajput / Plan: [facility] [employer] MEDICARE CHOICE PPO/PFFS / Product Type: Managed Care Medicare /    In time:10:30A  Out time: 11:10A  Total Treatment Time (min): 40  Visit #: 3 of 12    Medicare/BCBS Only   Total Timed Codes (min):  40 1:1 Treatment Time:  40       Treatment Area: Right knee pain [M25.561]    SUBJECTIVE  Pain Level (0-10 scale): 0/10  Any medication changes, allergies to medications, adverse drug reactions, diagnosis change, or new procedure performed?: [x] No    [] Yes (see summary sheet for update)  Subjective functional status/changes:   [] No changes reported    Patient reports his knee feels ok today but it is still tight. His knee still gets painful after riding his bike at home with the knee brace after 30 min; he reported this to the person who provided the brace who stated He saw his physician yesterday who informed the pt they would like him to have 110 deg of right knee flexion. He reports going upstairs is easier but going downstairs is still challenging and painful. OBJECTIVE    32 min Therapeutic Exercise:  [x] See flow sheet :   Rationale: increase ROM and increase strength to improve the patients ability to perform ADLs with increased ease      8 min Manual Therapy:  Patellar mobs grades III/IV, posterior tibial mobs grade IV   The manual therapy interventions were performed at a separate and distinct time from the therapeutic activities interventions. Rationale: increase ROM and increase tissue extensibility to perform ADLs with increased ease              With   [] TE   [] TA   [] neuro   [] other: Patient Education: [x] Review HEP    [] Progressed/Changed HEP based on:   [] positioning   [] body mechanics   [] transfers   [] heat/ice application    [] other:      Other Objective/Functional Measures:      Mod patellar hypomobility with lateral glide, max hypomobility in other directions   Knee flex stretch performed in between sets of TRX squats  Right knee flex AROM following exercise: 97 deg     Pain Level (0-10 scale) post treatment: 0/10    ASSESSMENT/Changes in Function: Patient continues to remain motivated to participate in therapy session and is diligent with his HEP. He remains challenged with eccentric control on the the right LE with navigating stairs. Patient will continue to benefit from skilled PT services to modify and progress therapeutic interventions, address functional mobility deficits, address ROM deficits, address strength deficits, analyze and address soft tissue restrictions, analyze and cue movement patterns and analyze and modify body mechanics/ergonomics to attain remaining goals. Progress towards goals / Updated goals:  1. Patient will improve right knee AROM: 2-105 degrees in order to increase ease of ambulation.   Not met: 8-91 degrees (1/29/21)     2. Patient will improve right knee MMT to 5/5 in order to increase ease of ADLs.  Progressing,right knee flexion: 4-/5. Right knee ext: 4+/5 (1/27/21)      3.  Patient will perform sit to stand transfer without UE support in order to increase ease of transfers    MET (1/25/21)     PLAN  [x]  Upgrade activities as tolerated     [x]  Continue plan of care  []  Update interventions per flow sheet       []  Discharge due to:_  []  Other:_      Marcello Console, PT 2/4/2021  10:24 AM    Future Appointments   Date Time Provider Davina King   2/4/2021 10:30 AM Tc Greene PT [facility]   2/5/2021  2:15 PM Edson Ash [facility]   2/8/2021  2:15 PM Fernando Apt, [facility]   2/10/2021  3:00 PM Fernando Apt, [facility]   2/12/2021  1:30 PM Fernando Apt, PT [facility]

## 2021-02-05 ENCOUNTER — HOSPITAL ENCOUNTER (OUTPATIENT)
Dept: PHYSICAL THERAPY | Age: 86
Discharge: HOME | End: 2021-02-05
Payer: MEDICARE

## 2021-02-05 PROCEDURE — 97530 THERAPEUTIC ACTIVITIES: CPT

## 2021-02-05 PROCEDURE — 97110 THERAPEUTIC EXERCISES: CPT

## 2021-02-05 PROCEDURE — 97140 MANUAL THERAPY 1/> REGIONS: CPT

## 2021-02-05 NOTE — PROGRESS NOTES
PT DAILY TREATMENT NOTE     Patient Name: Cris Julian  Date:2021  : 10/8/1930  [x]  Patient  Verified  Payor: Ekta Henriquez / Plan: [facility] MEDICARE CHOICE PPO/PFFS / Product Type: Managed Care Medicare /    In time:2:10  Out time:2:52  Total Treatment Time (min): 42  Visit #: 4 of 12    Medicare/BCBS Only   Total Timed Codes (min):  42 1:1 Treatment Time:  42       Treatment Area: Right knee pain [M25.561]    SUBJECTIVE  Pain Level (0-10 scale): 0  Any medication changes, allergies to medications, adverse drug reactions, diagnosis change, or new procedure performed?: [x] No    [] Yes (see summary sheet for update)  Subjective functional status/changes:   [] No changes reported  Pt states he still has a hard time going down stairs    OBJECTIVE    22 min Therapeutic Exercise:  [x] See flow sheet :   Rationale: increase ROM and increase strength to improve the patients ability to perform ADLs    10 min Therapeutic Activity:  [x]  See flow sheet :   Rationale: increase ROM, increase strength, improve coordination and improve balance  to improve the patients ability to increase ease with daily tasks     10 min Manual Therapy:  Post tib mobs, patella mobs, PROM with focus on knee flex   The manual therapy interventions were performed at a separate and distinct time from the therapeutic activities interventions. Rationale: decrease pain, increase ROM and increase tissue extensibility to improve functional mobility            With   [] TE   [] TA   [] neuro   [] other: Patient Education: [x] Review HEP    [] Progressed/Changed HEP based on:   [] positioning   [] body mechanics   [] transfers   [] heat/ice application    [] other:      Other Objective/Functional Measures:      Pain Level (0-10 scale) post treatment: 0    ASSESSMENT/Changes in Function: Challenged with lateral step ups and vc's to focus on ecc control with stepping down. Focused on knee flex str with leg press.  Pt performed therex void of pain but c/o knee stiffness. Patient will continue to benefit from skilled PT services to modify and progress therapeutic interventions, address functional mobility deficits, address ROM deficits, address strength deficits, analyze and address soft tissue restrictions, analyze and cue movement patterns, analyze and modify body mechanics/ergonomics and address imbalance/dizziness to attain remaining goals. []  See Plan of Care  []  See progress note/recertification  []  See Discharge Summary         Progress towards goals / Updated goals:  1. Patient will improve right knee AROM: 2-105 degrees in order to increase ease of ambulation.   Not met: 8-91 degrees (1/29/21)     2. Patient will improve right knee MMT to 5/5 in order to increase ease of ADLs.  Progressing,right knee flexion: 4-/5. Right knee ext: 4+/5 (1/27/21)      3.  Patient will perform sit to stand transfer without UE support in order to increase ease of transfers    MET (1/25/21)     PLAN  []  Upgrade activities as tolerated     [x]  Continue plan of care  []  Update interventions per flow sheet       []  Discharge due to:_  []  Other:_      Demarcus Baldwin, VICKI 2/5/2021  2:16 PM    Future Appointments   Date Time Provider Davina King   2/8/2021  2:15 PM Hedda Elke, PT [facility]   2/10/2021  3:00 PM Hedda Elke, PT [facility]   2/12/2021  1:30 PM Hedda Elke, PT [facility]

## 2021-02-08 ENCOUNTER — HOSPITAL ENCOUNTER (OUTPATIENT)
Dept: PHYSICAL THERAPY | Age: 86
Discharge: HOME | End: 2021-02-08
Payer: MEDICARE

## 2021-02-08 PROCEDURE — 97530 THERAPEUTIC ACTIVITIES: CPT

## 2021-02-08 PROCEDURE — 97140 MANUAL THERAPY 1/> REGIONS: CPT

## 2021-02-08 PROCEDURE — 97110 THERAPEUTIC EXERCISES: CPT

## 2021-02-08 NOTE — PROGRESS NOTES
PT DAILY TREATMENT NOTE     Patient Name: Alfa Brewer  Date:2021  : 10/8/1930  [x]  Patient  Verified  Payor: Haseeb Booth / Plan: [facility] [employer] MEDICARE CHOICE PPO/PFFS / Product Type: Managed Care Medicare /    In time:2:15  Out time:2:58  Total Treatment Time (min): 43  Visit #: 5 of 12    Medicare/BCBS Only   Total Timed Codes (min):  43 1:1 Treatment Time:  43       Treatment Area: Right knee pain [M25.561]    SUBJECTIVE  Pain Level (0-10 scale): 0/10  Any medication changes, allergies to medications, adverse drug reactions, diagnosis change, or new procedure performed?: [x] No    [] Yes (see summary sheet for update)  Subjective functional status/changes:   [] No changes reported  Pt reports he feels his knee is getting a little better. OBJECTIVE     20 min Therapeutic Exercise:  [x]? See flow sheet :   Rationale: increase ROM and increase strength to improve the patients ability to perform ADLs     13 min Therapeutic Activity:  [x]? See flow sheet :   Rationale: increase ROM, increase strength, improve coordination and improve balance  to improve the patients ability to increase ease with daily tasks     10 min Manual Therapy:  Post tib mobs, patella mobs, PROM with oscillations   The manual therapy interventions were performed at a separate and distinct time from the therapeutic activities interventions. Rationale: decrease pain, increase ROM and increase tissue extensibility to improve functional mobility             With   [x] TE   [] TA   [] neuro   [] other: Patient Education: [x] Review HEP    [] Progressed/Changed HEP based on:   [] positioning   [] body mechanics   [] transfers   [] heat/ice application    [] other:      Other Objective/Functional Measures:   AROM of right knee: (10*-95* )  8*-98* following exercise. Split foot, right lead, sit to stand.      Pain Level (0-10 scale) post treatment: 0/10    ASSESSMENT/Changes in Function:  Pt is progressing slowly toward therapy goals. Pt continues to report he doesn't have trouble with ADL's, the knee is just tight. Reduced edema noted on this date. Patient will continue to benefit from skilled PT services to modify and progress therapeutic interventions, address functional mobility deficits, address ROM deficits and address strength deficits to attain remaining goals. Progress towards goals / Updated goals:  1. Patient will improve right knee AROM: 2-105 degrees in order to increase ease of ambulation.   Not met: 8-98* (2/8/21)     2. Patient will improve right knee MMT to 5/5 in order to increase ease of ADLs.  Met 4+/5 (2/8/21)      3. Patient will perform sit to stand transfer without UE support in order to increase ease of transfers    MET (1/25/21)     PLAN  []  Upgrade activities as tolerated     [x]  Continue plan of care  []  Update interventions per flow sheet       []  Discharge due to:_  []  Other:_      Saul Esquivel 2/8/2021  2:18 PM    I was present during the entire treatment, directing and participating in the treatment.    Jessica Claire DPT      Future Appointments   Date Time Provider Davina King   2/10/2021  3:00 PM Domitila Laird [facility]   2/12/2021  1:30 PM William Casillas [facility]

## 2021-02-10 ENCOUNTER — HOSPITAL ENCOUNTER (OUTPATIENT)
Dept: PHYSICAL THERAPY | Age: 86
Discharge: HOME | End: 2021-02-10
Payer: MEDICARE

## 2021-02-10 PROCEDURE — 97110 THERAPEUTIC EXERCISES: CPT

## 2021-02-10 PROCEDURE — 97530 THERAPEUTIC ACTIVITIES: CPT

## 2021-02-10 PROCEDURE — 97140 MANUAL THERAPY 1/> REGIONS: CPT

## 2021-02-10 NOTE — PROGRESS NOTES
PT DAILY TREATMENT NOTE     Patient Name: Bismark Mendiola  Date:2/10/2021  : 10/8/1930  [x]  Patient  Verified  Payor: [employer] MEDICARE / Plan: [facility] [employer] MEDICARE CHOICE PPO/PFFS / Product Type: Managed Care Medicare /    In time:3:00  Out time:3:41  Total Treatment Time (min): 41  Visit #: 6 of 12    Medicare/BCBS Only   Total Timed Codes (min):  41 1:1 Treatment Time:  41       Treatment Area: Right knee pain [M25.561]    SUBJECTIVE  Pain Level (0-10 scale): 0/10  Any medication changes, allergies to medications, adverse drug reactions, diagnosis change, or new procedure performed?: [x] No    [] Yes (see summary sheet for update)  Subjective functional status/changes:   [] No changes reported  Pt reported he feels the same. Tight.     OBJECTIVE    20 min Therapeutic Exercise:  [x]?? See flow sheet : leg strengthening    Rationale: increase ROM and increase strength to improve the patient’s ability to perform ADLs     11 min Therapeutic Activity:  [x]??  See flow sheet : HEP, pt education,    Rationale: increase ROM, increase strength, improve coordination and improve balance  to improve the patient’s ability to increase ease with daily tasks     10 min Manual Therapy:  Post/anterior tib mobs, patella mobs, PROM with oscillations   The manual therapy interventions were performed at a separate and distinct time from the therapeutic activities interventions.  Rationale: decrease pain, increase ROM and increase tissue extensibility to improve functional mobility             With   [x] TE   [] TA   [] neuro   [] other: Patient Education: [x] Review HEP    [] Progressed/Changed HEP based on:   [] positioning   [] body mechanics   [] transfers   [] heat/ice application    [] other:      Other Objective/Functional Measures:   8*-98* AROM of right knee.   Increased volume on this date, pt handled well.   Right foot back split stance sit to stand.   Eccentric split stance with left foot infront of right. 4\" on  the way down. Pain Level (0-10 scale) post treatment: 0/10    ASSESSMENT/Changes in Function:   Pt is progressing toward therapy goals. Showing slight improve in AROM upon entering today. Pt reporting he is compliant with HEP. Patient will continue to benefit from skilled PT services to modify and progress therapeutic interventions, address functional mobility deficits, address ROM deficits and address strength deficits to attain remaining goals. Progress towards goals / Updated goals:  1. Patient will improve right knee AROM: 2-105 degrees in order to increase ease of ambulation.   Not met: 8-98* (2/8/21)     2. Patient will improve right knee MMT to 5/5 in order to increase ease of ADLs.  Met 4+/5 (2/8/21)      3. Patient will perform sit to stand transfer without UE support in order to increase ease of transfers    MET (1/25/21)     PLAN  []  Upgrade activities as tolerated     [x]  Continue plan of care  []  Update interventions per flow sheet       []  Discharge due to:_  []  Other:_      Hector Estrella 2/10/2021  3:03 PM    I was present during the entire treatment, directing and participating in the treatment.    Harini Deleon DPT      Future Appointments   Date Time Provider Davina King   2/12/2021  1:30 PM Micaela Kennedy [facility]

## 2021-02-12 ENCOUNTER — HOSPITAL ENCOUNTER (OUTPATIENT)
Dept: PHYSICAL THERAPY | Age: 86
Discharge: HOME | End: 2021-02-12
Payer: MEDICARE

## 2021-02-12 PROCEDURE — 97530 THERAPEUTIC ACTIVITIES: CPT

## 2021-02-12 PROCEDURE — 97140 MANUAL THERAPY 1/> REGIONS: CPT

## 2021-02-12 PROCEDURE — 97110 THERAPEUTIC EXERCISES: CPT

## 2021-02-12 NOTE — PROGRESS NOTES
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]  Payor: Roshni Barclay / Plan: [facility] [employer] MEDICARE CHOICE PPO/PFFS / Product Type: Managed Care Medicare /  Onset Date:20               Treatment Diagnosis:  Right knee pain   Prior Hospitalization: see medical history Provider#: [id number]   Medications: Verified on Patient summary List    Comorbidities COPD, cellulitis following surgery   Prior Level of Function: Ind with ambulation, Ind with ADLs    Visits from Start of Care: 17    Missed Visits: 0    The Plan of Care and following information is based on the patient's current status:  Goal: Patient will improve right knee AROM 2-105 degrees in order to increase ease of ambulation. Status at last note/certification: flex: 92 degrees  Current Status: not met    Goal: Patient will improve right knee MMT to 5/5 in order to increase ease of ADLs. Status at last note/certification: 4+/5  Current Status: not met    Goal: Patient will perform 8 sit to stand transfers in 30 seconds in order to demonstrate increased ease of transfers at home.    Status at last note/certification: unable  Current Status: not met    Key functional changes: slight improvement in right knee AROM and sit to stand transfers      Problems/ barriers to goal attainment: none     Problem List: pain affecting function, decrease ROM, decrease strength, edema affecting function, impaired gait/ balance, decrease ADL/ functional abilitiies, decrease activity tolerance, decrease flexibility/ joint mobility and decrease transfer abilities    Treatment Plan: Therapeutic exercise, Therapeutic activities, Neuromuscular re-education, Physical agent/modality, Gait/balance training, Manual therapy, Patient education, Self Care training, Functional mobility training and Home safety training     Patient Goal (s) has been updated and includes: to go down stairs easier     Goals for this certification period to be accomplished in 4 weeks:  1. Patient will improve right knee AROM 2-105 degrees in order to increase ease of descending stairs  2. Patient will improve right knee MMT to 5/5 in order to increase ease of ADLs.  3. Patient will improve 30 second sit to stand test to 8x in order to increase ease of transfers at home.     Frequency / Duration: Patient to be seen 2 times per week for 4 weeks:    Assessment / Recommendations: Patient is progressing slowly towards goals. He continues to complain of tightness in his knee and has difficulty descending stairs. Right knee AROM improved slightly 6-96 degrees and PROM is 3-100 degrees. He continues to demonstrate improvement in mobility following exercises. He has firm end feel at end range flexion and extension during PROM. 30 second sit to stand test was 6x. Right knee MMT ext: 4+/5 flex: 4/5. Skilled PT is medically necessary in order to continue to improve right knee mobility and strength for increased ease of ambulation and improved quality of life.     Certification Period: 2/12/21-3/12/21    Maurizio Rodrigues, PT 2/12/2021 2:16 PM    ________________________________________________________________________  I certify that the above Therapy Services are being furnished while the patient is under my care. I agree with the treatment plan and certify that this therapy is necessary.    [] I have read the above and request that my patient continue as recommended.  [] I have read the above report and request that my patient continue therapy with the following changes/special instructions: _______________________________________  [] I have read the above report and request that my patient be discharged  from therapy    Physician's Signature:____________Date:_________TIME:________     JAY Weeks  ** Signature, Date and Time must be completed for valid certification **    Please sign and return to [facility]  [address]  [phone] fax

## 2021-02-12 NOTE — PROGRESS NOTES
PT DAILY TREATMENT NOTE     Patient Name: Elsa Mackay  Date:2021  : 10/8/1930  [x]  Patient  Verified  Payor: Damian Martinez / Plan: [facility] [employer] MEDICARE CHOICE PPO/PFFS / Product Type: Managed Care Medicare /    In time:1:30P  Out time:2:12P  Total Treatment Time (min): 42  Visit #: 7 of 12    Medicare/BCBS Only   Total Timed Codes (min):  42 1:1 Treatment Time:  42       Treatment Area: Right knee pain [M25.561]    SUBJECTIVE  Pain Level (0-10 scale): 0/10  Any medication changes, allergies to medications, adverse drug reactions, diagnosis change, or new procedure performed?: [x] No    [] Yes (see summary sheet for update)  Subjective functional status/changes:   [] No changes reported    Patient reports he feels his knee is getting better but slowly. OBJECTIVE    12 min Therapeutic Exercise:  [] See flow sheet :   Rationale: increase ROM and increase strength to improve the patients ability to perform ADLs with increased ease    20 min Therapeutic Activity:  [x]  See flow sheet : FOTO, goal assessment   Rationale: increase ROM and increase strength  to improve the patients ability to perform ADLs with increased ease       10 min Manual Therapy:   Grade IV Post/anterior tib mobs, Grade III/IV patella mobs all directions   The manual therapy interventions were performed at a separate and distinct time from the therapeutic activities interventions.   Rationale: decrease pain, increase ROM and increase tissue extensibility to perform ADLs with increased ease          With   [] TE   [] TA   [] neuro   [] other: Patient Education: [x] Review HEP    [] Progressed/Changed HEP based on:   [] positioning   [] body mechanics   [] transfers   [] heat/ice application    [] other:      Other Objective/Functional Measures:     Right knee AROM ext -6, flex 96 , PROM, Ext -3 flex 100   FOTO=[id number]      Pain Level (0-10 scale) post treatment: 0/10    ASSESSMENT/Changes in Function: Patient making slow progress towards AROM goals with skilled outpatient PT. Thirty second sit to stand score is 6x. Patient reports he is consistent with HEP including stretching to promote AROM. Right knee strength is improving. Patient reports he feels some improvement in the knee. Patient will continue to benefit from skilled PT services to modify and progress therapeutic interventions, address functional mobility deficits, address ROM deficits, address strength deficits, analyze and address soft tissue restrictions, analyze and cue movement patterns, analyze and modify body mechanics/ergonomics, assess and modify postural abnormalities, address imbalance/dizziness and instruct in home and community integration to attain remaining goals. Progress towards goals / Updated goals:     SEE RECERTIFICATION NOTE FOR UPDATED GOALS    1. Patient will improve right knee AROM 2-105 degrees in order to increase ease of ambulation. Knee ext AROM: -6 deg, knee flex AROM 96 deg (2/12/21)  2. Patient will improve right knee MMT to 5/5 in order to increase ease of ADLs. Knee ext MMT: 4+/5, knee ext MMT: 4/5 (2/12/21)    3. Patient will perform 8 sit to stand transfers in 30 seconds in order to demonstrate increased ease of transfers at home.    30 sec sit to stand-6x (2/12/21)      PLAN  [x]  Upgrade activities as tolerated     [x]  Continue plan of care  []  Update interventions per flow sheet       []  Discharge due to:_  []  Other:_      Uriel Leslie, PT 2/12/2021  12:16 PM    Future Appointments   Date Time Provider Davina King   2/12/2021  1:30 PM Tc Yi, PT [facility]   2/17/2021 11:15 AM Danya Delay, PT [facility]   2/19/2021  9:00 AM Tc Yi, PT [facility]   2/24/2021  2:15 PM Danya Delay, PT [facility]   2/26/2021 12:00 PM Danya Delay, PT [id number] [facility]   3/3/2021 11:15 AM Danya Delay, PT [facility]   3/5/2021 12:00 PM Tc Yi PT [id number] [facility]   3/10/2021 12:00 PM [place], [place] [id number] [facility]   3/12/2021  1:30 PM Gm Davila, PT [facility]   3/17/2021 12:45 PM [facility]   3/19/2021 11:15 AM mG Davila, PT [facility]

## 2021-02-17 ENCOUNTER — HOSPITAL ENCOUNTER (OUTPATIENT)
Dept: PHYSICAL THERAPY | Age: 86
Discharge: HOME | End: 2021-02-17
Payer: MEDICARE

## 2021-02-17 PROCEDURE — 97140 MANUAL THERAPY 1/> REGIONS: CPT

## 2021-02-17 PROCEDURE — 97110 THERAPEUTIC EXERCISES: CPT

## 2021-02-17 NOTE — PROGRESS NOTES
PT DAILY TREATMENT NOTE     Patient Name: Cory Sanders  Date:2021  : 10/8/1930  [x]  Patient  Verified  Payor: Hank Arnold / Plan: [facility] [employer] MEDICARE CHOICE PPO/PFFS / Product Type: Managed Care Medicare /    In time:11:15A  Out time:11:56P  Total Treatment Time (min): 41  Visit #: 1 of 8    Medicare/BCBS Only   Total Timed Codes (min): 41 1:1 Treatment Time:  39       Treatment Area: Right knee pain [M25.561]    SUBJECTIVE  Pain Level (0-10 scale): 0/10  Any medication changes, allergies to medications, adverse drug reactions, diagnosis change, or new procedure performed?: [x] No    [] Yes (see summary sheet for update)  Subjective functional status/changes:   [] No changes reported      Patient reports he feels his knee is getting better but it is still tight. Patient was supposed to have an MD appt today but has to reschedule it. OBJECTIVE    29 min Therapeutic Exercise:  [] See flow sheet :   Rationale: increase ROM, increase strength and improve coordination to improve the patients ability to perform functional tasks with increased ease        12 min Manual Therapy:  Grade III/IV patellar mobilizations all directions, manual stretching into knee extension; tibial distraction with passive knee flex at EOB   The manual therapy interventions were performed at a separate and distinct time from the therapeutic activities interventions. Rationale: decrease pain, increase ROM and increase tissue extensibility to perform functional tasks with increased ease           With   [] TE   [] TA   [] neuro   [] other: Patient Education: [x] Review HEP    [] Progressed/Changed HEP based on:   [] positioning   [] body mechanics   [] transfers   [] heat/ice application    [] other:      Other Objective/Functional Measures:      Mod patellar hypomobility with lateral glide, max with other directions     Pain Level (0-10 scale) post treatment: 0/10    ASSESSMENT/Changes in Function: Patient continues to deny pain with activities. Activities is session continue to focus on improving TKE and knee flex AROM. Patient reported his knee felt better at end of session. Patient will continue to benefit from skilled PT services to modify and progress therapeutic interventions, address functional mobility deficits, address ROM deficits, address strength deficits, analyze and address soft tissue restrictions, analyze and cue movement patterns, analyze and modify body mechanics/ergonomics, assess and modify postural abnormalities, address imbalance/dizziness and instruct in home and community integration to attain remaining goals. Progress towards goals / Updated goals:  Goals for this certification period to be accomplished in 4 weeks:  1. Patient will improve right knee AROM 2-105 degrees in order to increase ease of descending stairs  2. Patient will improve right knee MMT to 5/5 in order to increase ease of ADLs. 3. Patient will improve 30 second sit to stand test to 8x in order to increase ease of transfers at home.      PLAN  [x]  Upgrade activities as tolerated     [x]  Continue plan of care  []  Update interventions per flow sheet       []  Discharge due to:_  []  Other:_      Alin Syed, PT 2/17/2021  11:20 AM    Future Appointments   Date Time Provider Davina King   2/19/2021  9:00 AM Judith YBARRA [address]   2/24/2021  2:15 PM Ajay Christianson, PT [facility] [address]   2/26/2021 12:00 PM Tc Vevelyn Shells, PT [facility] [address]   3/3/2021 11:15 AM Ajay Christianson, PT [facility] [address]   3/5/2021 12:00 PM Tc Vevelyn Shells, PT [facility] [address]   3/10/2021 12:00 PM Tc Vevelyn Shells, PT [facility] [address]   3/12/2021  1:30 PM Ajay Christianson, PT [facility] [address]   3/17/2021 12:45 PM Tc Vevelyn Shells, PT [facility] [address]   3/19/2021 11:15 AM Ajay Christianson, PT [facility] [address]

## 2021-02-19 ENCOUNTER — HOSPITAL ENCOUNTER (OUTPATIENT)
Dept: PHYSICAL THERAPY | Age: 86
Discharge: HOME | End: 2021-02-19
Payer: MEDICARE

## 2021-02-19 PROCEDURE — 97530 THERAPEUTIC ACTIVITIES: CPT

## 2021-02-19 PROCEDURE — 97110 THERAPEUTIC EXERCISES: CPT

## 2021-02-19 PROCEDURE — 97140 MANUAL THERAPY 1/> REGIONS: CPT

## 2021-02-19 NOTE — PROGRESS NOTES
PT DAILY TREATMENT NOTE     Patient Name: Lora Peguero  Date:2021  : 10/8/1930  [x]  Patient  Verified  Payor: Cindi Alvarez / Plan: [facility] MEDICARE CHOICE PPO/PFFS / Product Type: Managed Care Medicare /    In time:9:00 AM  Out time:9:40 AM  Total Treatment Time (min): 40  Visit #: 2 of 8    Medicare/BCBS Only   Total Timed Codes (min):  40 1:1 Treatment Time:  40       Treatment Area: Right knee pain [M25.561]    SUBJECTIVE  Pain Level (0-10 scale): 0  Any medication changes, allergies to medications, adverse drug reactions, diagnosis change, or new procedure performed?: [x] No    [] Yes (see summary sheet for update)  Subjective functional status/changes:   [] No changes reported  \"About the same. \"    OBJECTIVE        20 min Therapeutic Exercise:  [x] See flow sheet :   Rationale: increase ROM, increase strength and improve coordination to improve the patients ability to increase ease with ADLs    10 min Therapeutic Activity:  [x]  See flow sheet : step ups, mini squats, sit<>stands   Rationale: increase strength, improve coordination and increase proprioception  to improve the patients ability to ease with ADL tolerance         10 min Manual Therapy: In supine position: PROM to right knee with overpressure, right patellar mobs (all directions), popliteal release. The manual therapy interventions were performed at a separate and distinct time from the therapeutic activities interventions. Rationale: decrease pain, increase ROM and increase tissue extensibility to ease ADL tolerance and improve ROM        With   [] TE   [] TA   [] neuro   [] other: Patient Education: [x] Review HEP    [] Progressed/Changed HEP based on:   [] positioning   [] body mechanics   [] transfers   [] heat/ice application    [] other:      Other Objective/Functional Measures:    Added supine heel prop with 2# weight to promote TKE    Pain Level (0-10 scale) post treatment: 0    ASSESSMENT/Changes in Function:   Patient arrives to session void of AD--presents with unsteady gait and decreased stride length/olivia. Continues to lack TKE. Quad strength is gradually improving as noted with minimal difficulty/good eccentric control with table taps. Patient will continue to benefit from skilled PT services to modify and progress therapeutic interventions, address functional mobility deficits, address ROM deficits, address strength deficits, analyze and address soft tissue restrictions, analyze and cue movement patterns, analyze and modify body mechanics/ergonomics, assess and modify postural abnormalities and instruct in home and community integration to attain remaining goals. []  See Plan of Care  []  See progress note/recertification  []  See Discharge Summary         Progress towards goals / Updated goals:  Goals for this certification period to be accomplished in 4 weeks:  1. Patient will improve right knee AROM 2-105 degrees in order to increase ease of descending stairs  2. Patient will improve right knee MMT to 5/5 in order to increase ease of ADLs.   3. Patient will improve 30 second sit to stand test to 8x in order to increase ease of transfers at home.     PLAN  []  Upgrade activities as tolerated     [x]  Continue plan of care  []  Update interventions per flow sheet       []  Discharge due to:_  []  Other:_      Luis Manuel Ornelas 2/19/2021  8:49 AM    Future Appointments   Date Time Provider Davina King   2/19/2021  9:00 AM Yao Rose [id number] [facility]   2/24/2021  2:15 PM Danya Delay, PT MMCPTPB [facility]   2/26/2021 12:00 PM Tc Yasmin Bounds, PT [facility]   3/3/2021 11:15 AM [facility] Delay[facility]   3/5/2021 12:00 PM Ct Yasmin Bounds, PT [facility]   3/10/2021 12:00 PM Tc Yasmin Bounds, PT [facility]CP[facility]   3/12/2021  1:30 PM Danya Delay, PT [id number] [facility]   3/17/2021 12:45 PM Tc Yi, PT [facility]   3/19/2021 11:15 AM Danya Cardona PT HAR[facility]INDER [facility]

## 2021-02-24 ENCOUNTER — HOSPITAL ENCOUNTER (OUTPATIENT)
Dept: PHYSICAL THERAPY | Age: 86
Discharge: HOME | End: 2021-02-24
Payer: MEDICARE

## 2021-02-24 PROCEDURE — 97110 THERAPEUTIC EXERCISES: CPT

## 2021-02-24 PROCEDURE — 97140 MANUAL THERAPY 1/> REGIONS: CPT

## 2021-02-24 NOTE — PROGRESS NOTES
PT DAILY TREATMENT NOTE     Patient Name: Morgan Byrne  Date:2021  : 10/8/1930  [x]  Patient  Verified  Payor: Margot Grajeda / Plan: [facility] MEDICARE CHOICE PPO/PFFS / Product Type: Managed Care Medicare /    In time:345  Out time:432  Total Treatment Time (min): 55  Visit #: 3 of 8    Medicare/BCBS Only   Total Timed Codes (min):  46 1:1 Treatment Time:  46       Treatment Area: Right knee pain [M25.561]    SUBJECTIVE  Pain Level (0-10 scale): 0/10  Any medication changes, allergies to medications, adverse drug reactions, diagnosis change, or new procedure performed?: [x] No    [] Yes (see summary sheet for update)  Subjective functional status/changes:   [] No changes reported  \" I'm about the same\"    OBJECTIVE        32 min Therapeutic Exercise:  [] See flow sheet :   Rationale: increase ROM, increase strength, improve coordination and improve balance to improve the patients ability to ease with ADL's    15 min Manual Therapy:  PA tibial mobs, patella mobs, stick roll, seated contract/relax with belt (PT at back of pt) to increase knee flexion, PA mobs. The manual therapy interventions were performed at a separate and distinct time from the therapeutic activities interventions. Rationale: decrease pain, increase ROM, increase tissue extensibility and decrease trigger points to ease with adl's    With   [] TE   [] TA   [] neuro   [] other: Patient Education: [x] Review HEP    [] Progressed/Changed HEP based on:   [] positioning   [] body mechanics   [] transfers   [] heat/ice application    [] other:      Other Objective/Functional Measures: AROM=5-97 deg, PROM=0-110 deg after manual.     Eccentric control off 6 inch step. Pt reports no pain during all ex's. Pain Level (0-10 scale) post treatment: 0/10    ASSESSMENT/Changes in Function: Pt reports he follow up with MD tomorrow. He does take 2 pain pills before coming to PT.  Continue to have decrease TKE at heel strike    Patient will continue to benefit from skilled PT services to modify and progress therapeutic interventions, address functional mobility deficits, address ROM deficits, address strength deficits, analyze and address soft tissue restrictions, analyze and cue movement patterns, assess and modify postural abnormalities and address imbalance/dizziness to attain remaining goals. []  See Plan of Care  []  See progress note/recertification  []  See Discharge Summary         Progress towards goals / Updated goals:  1. Patient will improve right knee AROM 2-105 degrees in order to increase ease of descending stairs  2. Patient will improve right knee MMT to 5/5 in order to increase ease of ADLs.   3. Patient will improve 30 second sit to stand test to 8x in order to increase ease of transfers at home.        PLAN  []  Upgrade activities as tolerated     []  Continue plan of care  []  Update interventions per flow sheet       []  Discharge due to:_  []  Other:_      Clovis Loyd PT 2/24/2021  2:15 PM    Future Appointments   Date Time Provider Davina King   2/24/2021  3:45 PM Soy Stover PT [facility]   2/26/2021 12:00 PM Domitila Simmons [facility]CP[facility]   3/3/2021 11:15 AM Luis Payton, PT [facility]CPTP[facility]   3/5/2021 12:00 PM Tc Aleman, PT [facility]CP[facility]   3/10/2021 12:00 PM Tc Aleman, PT [facility]   3/12/2021  1:30 PM Luis Payton, PT [id number] [facility]   3/17/2021 12:45 PM Tc Aleman, PT [facility]CP[facility]   3/19/2021 11:15 AM Luis Payton, PT [facility]CP[facility]

## 2021-02-26 ENCOUNTER — APPOINTMENT (OUTPATIENT)
Dept: PHYSICAL THERAPY | Age: 86
End: 2021-02-26
Payer: MEDICARE

## 2021-03-03 ENCOUNTER — HOSPITAL ENCOUNTER (OUTPATIENT)
Dept: PHYSICAL THERAPY | Age: 86
Discharge: HOME | End: 2021-03-03
Payer: MEDICARE

## 2021-03-03 PROCEDURE — 97112 NEUROMUSCULAR REEDUCATION: CPT

## 2021-03-03 PROCEDURE — 97140 MANUAL THERAPY 1/> REGIONS: CPT

## 2021-03-03 PROCEDURE — 97110 THERAPEUTIC EXERCISES: CPT

## 2021-03-03 PROCEDURE — 97530 THERAPEUTIC ACTIVITIES: CPT

## 2021-03-03 NOTE — PROGRESS NOTES
PT DAILY TREATMENT NOTE     Patient Name: Caren Hanna  Date:3/3/2021  : 10/8/1930  [x]  Patient  Verified  Payor: Danie Fernandez / Plan: [facility] MEDICARE CHOICE PPO/PFFS / Product Type: Managed Care Medicare /    In time:11:11  Out time:11:56  Total Treatment Time (min): 45  Visit #: 4 of 8    Medicare/BCBS Only   Total Timed Codes (min):  45 1:1 Treatment Time:  45       Treatment Area: Right knee pain [M25.561]    SUBJECTIVE  Pain Level (0-10 scale): 0/10  Any medication changes, allergies to medications, adverse drug reactions, diagnosis change, or new procedure performed?: [x] No    [] Yes (see summary sheet for update)  Subjective functional status/changes:   [] No changes reported  Pt reported he visited the MD this week. Continue physical therapy. OBJECTIVE    15 min Therapeutic Exercise:  [x] See flow sheet :   Rationale: increase ROM and increase strength to improve the patients ability to carry out ADL's    10 min Neuromuscular Re-education:  [x]  See flow sheet :   Rationale: increase ROM, increase strength and improve coordination  to improve the patients ability to carry out ADL's    20 min Manual Therapy:  Patellar mob, AP tibial grade III/IV, contract relax to hamstrings. The manual therapy interventions were performed at a separate and distinct time from the therapeutic activities interventions. Rationale: increase ROM and increase tissue extensibility to carry out ADL's                         With   [] TE   [] TA   [] neuro   [] other: Patient Education: [x] Review HEP    [] Progressed/Changed HEP based on:   [] positioning   [] body mechanics   [] transfers   [] heat/ice application    [] other:      Other Objective/Functional Measures:   6* active extension,  4* Passive extension in supine   99* active flexion, 102* passive flexion in supine.    3* active extension following manual therapy     Contract relax to hamstrings and quads for increased ROM, pt tolerated well.         Pain Level (0-10 scale) post treatment: 0/10    ASSESSMENT/Changes in Function:   Pt is progressing toward therapy goals. Pt responds well to manual therapy with increased range following. Pt continues to complain of tightness but has noticed increased ease with descending stairs. Patient will continue to benefit from skilled PT services to modify and progress therapeutic interventions, address functional mobility deficits, address ROM deficits and address strength deficits to attain remaining goals. Progress towards goals / Updated goals:  1. Patient will improve right knee AROM 2-105 degrees in order to increase ease of descending stairs   progressin-99 degrees (3/3/21)  2. Patient will improve right knee MMT to 5/5 in order to increase ease of ADLs.   3. Patient will improve 30 second sit to stand test to 8x in order to increase ease of transfers at home.        PLAN  []  Upgrade activities as tolerated     [x]  Continue plan of care  []  Update interventions per flow sheet       []  Discharge due to:_  []  Other:_      CASSIUS Recinos 3/3/2021  11:10 AM    Future Appointments   Date Time Provider Davina King   3/3/2021 11:15 AM Estrada Richardson, PT [facility]CPTPB [facility]   3/5/2021 12:00 PM Tc Alfredo, PT [facility]   3/10/2021 12:00 PM Renee Schilling [id number] [facility]   3/12/2021  1:30 PM Tc Alfredo, PT [facility]CP[facility]   3/17/2021 12:45 PM Tc Alfredo, PT [facility]CPTPB [facility]   3/19/2021 11:15 AM Estrada Richardson, PT [facility]

## 2021-03-05 ENCOUNTER — HOSPITAL ENCOUNTER (OUTPATIENT)
Dept: PHYSICAL THERAPY | Age: 86
Discharge: HOME | End: 2021-03-05
Payer: MEDICARE

## 2021-03-05 PROCEDURE — 97140 MANUAL THERAPY 1/> REGIONS: CPT

## 2021-03-05 PROCEDURE — 97110 THERAPEUTIC EXERCISES: CPT

## 2021-03-10 ENCOUNTER — HOSPITAL ENCOUNTER (OUTPATIENT)
Dept: PHYSICAL THERAPY | Age: 86
Discharge: HOME | End: 2021-03-10
Payer: MEDICARE

## 2021-03-10 PROCEDURE — 97110 THERAPEUTIC EXERCISES: CPT

## 2021-03-10 PROCEDURE — 97140 MANUAL THERAPY 1/> REGIONS: CPT

## 2021-03-10 NOTE — PROGRESS NOTES
PT DAILY TREATMENT NOTE     Patient Name: Fidelina Vergara  Date:3/10/2021  : 10/8/1930  [x]  Patient  Verified  Payor: Dex Olivera / Plan: [facility] MEDICARE CHOICE PPO/PFFS / Product Type: Managed Care Medicare /    In time:1:30  Out time:2:12  Total Treatment Time (min): 42  Visit #: 6 of 8    Medicare/BCBS Only   Total Timed Codes (min):  42 1:1 Treatment Time:  42       Treatment Area: Right knee pain [M25.561]    SUBJECTIVE  Pain Level (0-10 scale): 0/10  Any medication changes, allergies to medications, adverse drug reactions, diagnosis change, or new procedure performed?: [x] No    [] Yes (see summary sheet for update)  Subjective functional status/changes:   [] No changes reported  Patient states he is still having difficulty going down the stairs due to pain in his right knee. He reports he has not had pain since his last visit but is still having a lot of tightness. OBJECTIVE    32 min Therapeutic Exercise:  [x] See flow sheet :   Rationale: increase ROM, increase strength and improve balance to improve the patients ability to carry out ADLs    10 min Manual Therapy:  Grade 3-4 patellar mobs,AP tibial grade 3-4, and hold relax to right quad   The manual therapy interventions were performed at a separate and distinct time from the therapeutic activities interventions. Rationale: decrease pain, increase ROM and increase tissue extensibility to allow patient to perform functional limitations with greater ease.      With   [] TE   [] TA   [] neuro   [] other: Patient Education: [x] Review HEP    [] Progressed/Changed HEP based on:   [] positioning   [] body mechanics   [] transfers   [] heat/ice application    [] other:      Other Objective/Functional Measures:   Performed grade 3-4 patellar mobs and AP tibial mobs  Performed contract relax stretching to quad  30\" sit<>stand test in chair: 10x  Modified TKE by having pt perform toe raise with ext of right leg with left leg slightly in front  Added leg press machine      Pain Level (0-10 scale) post treatment: 0/10    ASSESSMENT/Changes in Function:   Patient is slowly progressing towards goals as he is able to perform 10 squats during 30\" sit<>stand test. Patient still has difficulty going down the stairs due to weakness in quad with eccentric motion and tightness in his right knee. He still presents with pitting edema and is limited in flex/ext ROM. Patient has decreased pain, and is encouraged to initiate HEP for further progression. Patient will continue to benefit from skilled PT services to modify and progress therapeutic interventions, address functional mobility deficits, address ROM deficits, address strength deficits, analyze and modify body mechanics/ergonomics and address imbalance/dizziness to attain remaining goals. Progress towards goals / Updated goals:  1. Patient will improve right knee AROM 2-105 degrees in order to increase ease of descending stairs   progressin-99 degrees (3/3/21)  2. Patient will improve right knee MMT to 5/5 in order to increase ease of ADLs. 3. Patient will improve 30 second sit to stand test to 8x in order to increase ease of transfers at home. MET: 10x (3/20/21)    PLAN  []  Upgrade activities as tolerated     [x]  Continue plan of care  []  Update interventions per flow sheet       []  Discharge due to:_  []  Other:_      Charls Pap 3/10/2021  12:59 PM    I was present during the entire treatment, directing and participating in the treatment.    Darrius Reyes DPT      Future Appointments   Date Time Provider Davina King   3/10/2021  1:30 PM Arie Drake PT [facility]   3/12/2021  1:30 PM Tc Ruiz PT [id number] [facility]   3/17/2021 12:45 PM SUE Sotmoayor [facility]   3/19/2021 11:15 AM SUE Otero [facility]

## 2021-03-12 ENCOUNTER — HOSPITAL ENCOUNTER (OUTPATIENT)
Dept: PHYSICAL THERAPY | Age: 86
Discharge: HOME | End: 2021-03-12
Payer: MEDICARE

## 2021-03-12 PROCEDURE — 97530 THERAPEUTIC ACTIVITIES: CPT

## 2021-03-12 PROCEDURE — 97140 MANUAL THERAPY 1/> REGIONS: CPT

## 2021-03-12 PROCEDURE — 97110 THERAPEUTIC EXERCISES: CPT

## 2021-03-12 NOTE — PROGRESS NOTES
PT DAILY TREATMENT NOTE     Patient Name: Beny Chamberlain  Date:3/12/2021  : 10/8/1930  [x]  Patient  Verified  Payor: Ranjith Haider [facility] / Product Type: Managed Care Medicare /    In time:1:31P  Out time:2:13P  Total Treatment Time (min): 42  Visit #: 7 of 8    Medicare/BCBS Only   Total Timed Codes (min):  42 1:1 Treatment Time:  42       Treatment Area: Right knee pain [M25.561]    SUBJECTIVE  Pain Level (0-10 scale): 0/10  Any medication changes, allergies to medications, adverse drug reactions, diagnosis change, or new procedure performed?: [x] No    [] Yes (see summary sheet for update)  Subjective functional status/changes:   [] No changes reported    Patient reports he feels his knee has gotten better but progress is still slow. He is ok with discharging in next two visits. He reports continued tightness/stiffness in the right knee. His biggest difficulty is with going downstairs. OBJECTIVE      14 min Therapeutic Exercise:  [x] See flow sheet :   Rationale: increase ROM, increase strength and improve coordination to improve the patients ability to perform ADLs with increased ease    14 min Therapeutic Activity:  [x]  See flow sheet : FOTO, goal assessment   Rationale: increase ROM and increase strength  to improve the patients ability to perform ADLs with increased ease       12 min Manual Therapy:  Grade III/IV patellar mobs all directions, grades III/VI A-P tibial mobs, manual stretching into extension    The manual therapy interventions were performed at a separate and distinct time from the therapeutic activities interventions.   Rationale: decrease pain and increase ROM to perform ADLs with increased ease          With   [] TE   [] TA   [] neuro   [] other: Patient Education: [x] Review HEP    [] Progressed/Changed HEP based on:   [] positioning   [] body mechanics   [] transfers   [] heat/ice application    [] other:      Other Objective/Functional Measures:     FOTO=67  Right knee AROM: ext -4, flex 100     Pain Level (0-10 scale) post treatment: 0/10    ASSESSMENT/Changes in Function: See Recertification    Patient will continue to benefit from skilled PT services to modify and progress therapeutic interventions, address functional mobility deficits, address ROM deficits, address strength deficits, analyze and address soft tissue restrictions, analyze and cue movement patterns, analyze and modify body mechanics/ergonomics, assess and modify postural abnormalities, address imbalance/dizziness and instruct in home and community integration to attain remaining goals. []  See Plan of Care  [x]  See progress note/recertification  []  See Discharge Summary         Progress towards goals / Updated goals:    SEE RECERTIFICATION FOR UPDATED GOALS    1. Patient will improve right knee AROM 2-105 degrees in order to increase ease of descending stairs   progressin-99 degrees (3/3/21)  Progressing-ext -4, flex 100 (3/12/21)   2. Patient will improve right knee MMT to 5/5 in order to increase ease of ADLs. Flex  4/5 Ext 5/5 (3/12/21)   3. Patient will improve 30 second sit to stand test to 8x in order to increase ease of transfers at home.  MET: 10x     PLAN  [x]  Upgrade activities as tolerated     [x]  Continue plan of care  []  Update interventions per flow sheet       []  Discharge due to:_  []  Other:_      Douglas Barragan PT 3/12/2021  9:12 AM    Future Appointments   Date Time Provider Davina King   3/12/2021  1:30 PM Tc Junior PT [id number]   3/17/2021 12:45 PM Tc Junior PT [facility]   3/19/2021 11:15 AM Radha Breen PT [facility]

## 2021-03-12 NOTE — PROGRESS NOTES
[facility] [place] [facility] [facility]  [place]  [address] [place] [address]  [phone] fax    Continued Plan of Care/ Re-certification for Physical Therapy Services    Patient name: Sasha Eldridge Start of Care: 2021   Referral source: Asa Lawson : 10/8/1930   Medical/Treatment Diagnosis: Right knee pain [M25.561]  Payor: [employer] MEDICARE / Plan: [facility] [employer] MEDICARE CHOICE PPO/PFFS / Product Type: Managed Care Medicare /  Onset Date2021     Prior Hospitalization: see medical history Provider#: [id number]   Medications: Verified on Patient Summary List    Comorbidities: COPD, cellulitis following surgery  Prior Level of Function:Ind with ambulation, Ind with ADLs  Visits from Start of Care: 24    Missed Visits: 0    The Plan of Care and following information is based on the patient's current status:  Goal: Patient will improve right knee AROM 2-105 degrees in order to increase ease of descending stairs  Status at last note/certification: flex: 92 degrees  Current Status: Progressing: right knee AROM: flex 100 deg, ext -4 deg    Goal: Patient will improve right knee MMT to 5/5 in order to increase ease of ADLs. Status at last note/certification: 4+/5  Current Status: Progressing-right knee flex 4/5, ext 5/5     Goal: Patient will improve 30 second sit to stand test to 8x in order to increase ease of transfers at home.    Status at last note/certification: required UE support  Current Status: MET-30 second sit to stand test score 10x        Key functional changes: improving right knee AROM, improved ability to perform sit to stand transfers    Problems/ barriers to goal attainment: none     Problem List: pain affecting function, decrease ROM, decrease strength, edema affecting function, impaired gait/ balance, decrease ADL/ functional abilitiies, decrease activity tolerance, decrease flexibility/ joint mobility and decrease transfer abilities    Treatment Plan: Therapeutic exercise, Therapeutic activities, Neuromuscular re-education, Physical agent/modality, Gait/balance training, Manual therapy, Patient education, Self Care training, Functional mobility training, Home safety training and Stair training     Patient Goal (s) has been updated and includes: \"to go downstairs better\"     Goals for this certification period to be accomplished in 2 treatments:  1. Patient will improve right knee AROM 2-105 degrees in order to increase ease of descending stairs. 2. Patient will improve right knee MMT to 5/5 in order to increase ease of ADLs. 3. Patient will be able to verbalize understanding of progressive HEP to optimize therapy outcomes. Frequency / Duration: Patient to be seen 2 times per week for 1 weeks:    Assessment / Recommendations: Patient continues to progress slowly with skilled outpatient PT. He reports the most difficulty with descending stairs. Right knee flex AROM is -4 deg with ext and 100 deg with flex. Right knee extension strength has improved; right knee flexion strength remains limited. Thirty second sit to stand test score has improved to 10x indicating improved functional mobility. He remains compliant with HEP. Patient would benefit from continued skilled outpatient PT for two additional treatments in order to address remaining deficits, educate patient on progressive HEP, and improve overall quality of life. Certification Period: 3/12/21-4/10/21    Estee Smoke, PT 3/12/2021 1:23 PM    ________________________________________________________________________  I certify that the above Therapy Services are being furnished while the patient is under my care. I agree with the treatment plan and certify that this therapy is necessary. [] I have read the above and request that my patient continue as recommended.   [] I have read the above report and request that my patient continue therapy with the following changes/special instructions: _______________________________________  [] I have read the above report and request that my patient be discharged from therapy    Physician's Signature:____________Date:_________TIME:________     JAY Mustafa  ** Signature, Date and Time must be completed for valid certification **    Please sign and return to [facility] [address] [facility]  [phone] fax

## 2021-03-17 ENCOUNTER — HOSPITAL ENCOUNTER (OUTPATIENT)
Dept: PHYSICAL THERAPY | Age: 86
Discharge: HOME | End: 2021-03-17
Payer: MEDICARE

## 2021-03-17 PROCEDURE — 97140 MANUAL THERAPY 1/> REGIONS: CPT

## 2021-03-17 PROCEDURE — 97110 THERAPEUTIC EXERCISES: CPT

## 2021-03-17 NOTE — PROGRESS NOTES
PT DAILY TREATMENT NOTE     Patient Name: Lora Peguero  Date:3/17/2021  : 10/8/1930  [x]  Patient  Verified  Payor: Cindi Alvarez / Plan: [facility] MEDICARE CHOICE PPO/PFFS / Product Type: Managed Care Medicare /    In time:12:45P  Out time:1:24P  Total Treatment Time (min):39  Visit #: 1 of 2    Medicare/BCBS Only   Total Timed Codes (min): 39 1:1 Treatment Time:  39       Treatment Area: Right knee pain [M25.561]    SUBJECTIVE  Pain Level (0-10 scale): 0/10  Any medication changes, allergies to medications, adverse drug reactions, diagnosis change, or new procedure performed?: [x] No    [] Yes (see summary sheet for update)  Subjective functional status/changes:   [] No changes reported    Patient reports he feels about the same today. OBJECTIVE      27 min Therapeutic Exercise:  [] See flow sheet :   Rationale: increase ROM, increase strength and improve coordination to improve the patients ability to perform functional tasks with increased ease      12 min Manual Therapy:  Grade III/IV patellar mobs all directions, grades III/VI A-P tibial mobs, manual stretching into extension    The manual therapy interventions were performed at a separate and distinct time from the therapeutic activities interventions. Rationale: decrease pain, increase ROM and increase tissue extensibility to perform functional tasks with increased ease          With   [] TE   [] TA   [] neuro   [] other: Patient Education: [x] Review HEP    [] Progressed/Changed HEP based on:   [] positioning   [] body mechanics   [] transfers   [] heat/ice application    [] other:      Other Objective/Functional Measures:     Patient unable to achieve his full ext ROM with leg press  Verbal and tactile cues to maintain right foot posterior with sit to stands     Pain Level (0-10 scale) post treatment: 0/10    ASSESSMENT/Changes in Function: Right knee AROM remains limited.  He was educated on continued performance of patellar mobilizations at home to maintain and promote overall knee mobility. Patient reports he is prepared for discharge next visit and has no questions at this time. Patient will continue to benefit from skilled PT services to modify and progress therapeutic interventions, address functional mobility deficits, address ROM deficits, address strength deficits, analyze and address soft tissue restrictions, analyze and cue movement patterns, analyze and modify body mechanics/ergonomics, assess and modify postural abnormalities, address imbalance/dizziness and instruct in home and community integration to attain remaining goals. Progress towards goals / Updated goals:  1. Patient will improve right knee AROM 2-105 degrees in order to increase ease of descending stairs. 2. Patient will improve right knee MMT to 5/5 in order to increase ease of ADLs. 3. Patient will be able to verbalize understanding of progressive HEP to optimize therapy outcomes.     PLAN  [x]  Upgrade activities as tolerated     [x]  Continue plan of care  []  Update interventions per flow sheet       []  Discharge due to:_  []  Other:_      Carmen Strauss, PT 3/17/2021  10:20 AM    Future Appointments   Date Time Provider Davina King   3/17/2021 12:45 PM Tc Brooks, PT [facility]   3/19/2021 11:15 AM Stepan Perez, PT [facility]

## 2021-03-19 ENCOUNTER — HOSPITAL ENCOUNTER (OUTPATIENT)
Dept: PHYSICAL THERAPY | Age: 86
Discharge: HOME | End: 2021-03-19
Payer: MEDICARE

## 2021-03-19 PROCEDURE — 97140 MANUAL THERAPY 1/> REGIONS: CPT

## 2021-03-19 PROCEDURE — 97110 THERAPEUTIC EXERCISES: CPT

## 2021-03-19 NOTE — PROGRESS NOTES
[facility]  [address] [facility]  [phone] fax    Physical Therapy Discharge Summary    Patient name: Abelino Higginbotham Start of Care: 2021   Referral source: Asa Johnston : 10/8/1930   Medical/Treatment Diagnosis: Right knee pain [M25.561]  Payor: [employer]IC[employer] / Plan: [facility] MEDICARE CHOICE PPO/PFFS / Product Type: Managed Care Medicare /  Onset Date2021      Prior Hospitalization: see medical history Provider#: [id number]   Medications: Verified on Patient Summary List     Comorbidities: COPD, cellulitis following surgery  Prior Level of Function:Ind with ambulation, Ind with ADLs    Visits from Start of Care:  26    Missed Visits: 0    Reporting Period :  3/12/21 to 3/19/21    Summary of Care:  Goal: Patient will improve right knee AROM 2-105 degrees in order to increase ease of descending stairs. Status at last note/certification: 9-072 degrees  Status at discharge: not met    Goal: Patient will improve right knee MMT to 5/5 in order to increase ease of ADLs. Status at last note/certification: 4+/5  Status at discharge: not met    Goal: Patient will be able to verbalize understanding of progressive HEP to optimize therapy outcomes. Status at last note/certification: n/a  Status at discharge: met    Pt. Has progressed slowly with physical therapy. His knee mobility has improved gradually over time and he continues to have firm end feel at end ranges. Right knee AROM is 5-103 degrees. Right knee MMT ext/flex 4+/5. He continues to have most difficulty with descending stairs, but reports this has improved some recently. He is compliant with his HEP and was educated on continuing with his HEP following D/C.      ASSESSMENT/RECOMMENDATIONS:  [x]Discontinue therapy: []Patient has reached or is progressing toward set goals      []Patient is non-compliant or has abdicated      [x]Due to lack of appreciable progress towards set goals    Sayra Frausto, PT 3/19/2021 10:24 AM

## 2021-03-19 NOTE — PROGRESS NOTES
PT DAILY TREATMENT NOTE     Patient Name: Maggie Danger  Date:3/19/2021  : 10/8/1930  [x]  Patient  Verified  Payor: Rah Álvarez / Plan: [facility] [employer] MEDICARE CHOICE PPO/PFFS / Product Type: Managed Care Medicare /    In time: 11:08  Out time: 11:48  Total Treatment Time (min): 40  Visit #: 2 of 2    Medicare/BCBS Only   Total Timed Codes (min):  40 1:1 Treatment Time:  40       Treatment Area: Right knee pain [M25.561]    SUBJECTIVE  Pain Level (0-10 scale):  0/10  Any medication changes, allergies to medications, adverse drug reactions, diagnosis change, or new procedure performed?: [x] No    [] Yes (see summary sheet for update)  Subjective functional status/changes:   [] No changes reported  Pt. Reports he is feeling about the same. He reports going down stairs is still difficult, but it is a little easier. OBJECTIVE    30 min Therapeutic Exercise:  [x] See flow sheet :   Rationale: increase ROM and increase strength to improve the patients ability to increase ease of ADLs    10 min Manual Therapy:  Patella mobs, ant/post tib mobs grade 4, contract relax knee flexion    The manual therapy interventions were performed at a separate and distinct time from the therapeutic activities interventions. Rationale: decrease pain, increase ROM and increase tissue extensibility to increase ease of ADLs          With   [x] TE   [] TA   [] neuro   [] other: Patient Education: [x] Review HEP    [] Progressed/Changed HEP based on:   [] positioning   [] body mechanics   [] transfers   [] heat/ice application    [] other:      Other Objective/Functional Measures:   Right knee AROM: 5-103 degrees  Right knee MMT ext: 4+/5 flex: 4+/5  HEP: pt. Demonstrates good understanding of HEP.      Pain Level (0-10 scale) post treatment: 0/10    ASSESSMENT/Changes in Function:      []  See Plan of Care  []  See progress note/recertification  [x]  See Discharge Summary         Progress towards goals / Updated goals:  See D/C note    PLAN  []  Upgrade activities as tolerated     []  Continue plan of care  []  Update interventions per flow sheet       [x]  Discharge due to:_ progress towards goals.    []  Other:_      Sandra Peña, PT 3/19/2021  8:03 AM    Future Appointments   Date Time Provider Davina King   3/19/2021 11:15 AM Omid Nelson, PT [facility]

## 2021-03-19 NOTE — PROGRESS NOTES
Physical Therapy Discharge Instructions      [facility]  [address] [facility]  [phone] fax    Patient: Estee Lopez  : 10/8/1930      Continue Home Exercise Program 1-2 times per day for 8 weeks, then decrease to 3 times per week      Continue with    [x] Ice  as needed   [x] Heat         Follow up with MD:     [] Upon completion of therapy     [x] As needed    Recommendations:     [x]   Return to activity with home program    []   Return to activity with the following modifications:       []Post Rehab Program    []Join Independent aquatic program     []Return to/join local gym    Additional Comments: Keep up the great work at home!     Dread English, PT 3/19/2021 8:03 AM

## 2021-03-29 ENCOUNTER — HOSPITAL ENCOUNTER (OUTPATIENT)
Dept: PHYSICAL THERAPY | Age: 86
Discharge: HOME | End: 2021-03-29
Payer: MEDICARE

## 2021-03-29 PROCEDURE — 97161 PT EVAL LOW COMPLEX 20 MIN: CPT

## 2021-03-29 PROCEDURE — 97110 THERAPEUTIC EXERCISES: CPT

## 2021-03-29 PROCEDURE — 97140 MANUAL THERAPY 1/> REGIONS: CPT

## 2021-03-29 NOTE — PROGRESS NOTES
PT DAILY TREATMENT NOTE/SHOULDER EVAL     Patient Name: Dorcas Ramirez  Date:3/29/2021  : 10/8/1930  [x]  Patient  Verified  Payor: Cesar Every / Plan: [facility] [employer] MEDICARE CHOICE PPO/PFFS / Product Type: Managed Care Medicare /    In time: 11:20 Out time: 12:02  Total Treatment Time (min): 42  Visit #: 1 of 8    Medicare/BCBS Only   Total Timed Codes (min):  24 1:1 Treatment Time:  42       Treatment Area: Right shoulder pain [M25.511]    SUBJECTIVE  Pain Level (0-10 scale):  0/10  []constant []intermittent []improving []worsening []no change since onset    Any medication changes, allergies to medications, adverse drug reactions, diagnosis change, or new procedure performed?: [x] No    [] Yes (see summary sheet for update)  Subjective functional status/changes:       Mechanism of Injury:  2 years ago was putting in a dry wall screw and has had pain since then. Has had cortisone shot in the past. Reports he can't use arm like he used to. Reports more difficulty with his writing. Reports difficulty reaching into cabinets. Unable to hammer or drill. Denies numbness and tingling. Pt Goals: to get better use of arm    OBJECTIVE/EXAMINATION    10 min Therapeutic Exercise:  [x] See flow sheet : HEP   Rationale: increase ROM and increase strength to improve the patients ability to increase ease of reaching    14 min Manual Therapy:  [address] post/inf mobs grade 4, infraspinatus trigger pont release   The manual therapy interventions were performed at a separate and distinct time from the therapeutic activities interventions.   Rationale: decrease pain, increase ROM and increase tissue extensibility to increase ease of reaching          With   [x] TE   [] TA   [] neuro   [] other: Patient Education: [x] Review HEP    [] Progressed/Changed HEP based on:   [] positioning   [] body mechanics   [] transfers   [] heat/ice application    [] other:        Physical Therapy Evaluation - Shoulder    Posture: [] Poor [x] Fair    [] Good    Describe: fwd shoulders, fwd head    ROM:  [] Unable to assess at this time                                           AROM                                                              PROM   Left Right  Left Right   Flexion 112 112 Flexion 143 122   Extension   Extension     Scaption/ 105 Scaptin/ 123   ER @ 0 Degrees 40 15 ER @ 0 Degrees     ER @ 90 Degrees   ER @ 90 Degrees 60 65   Behind back reaching L3 L3 IR @ 90 Degrees 43 5     End Feel / Painful Arc:    Strength:   [] Unable to assess at this time                                                                            L (1-5) R (1-5) Pain   Flexors 4 4- [] Yes   [] No   Abductors 4- 4+ [] Yes   [] No   External Rotators 4 4 [] Yes   [] No   Internal Rotators 5 5 [] Yes   [] No   Supraspinatus   [] Yes   [] No   Serratus Anterior   [] Yes   [] No   Lower Trapezius   [] Yes   [] No   Elbow Flexion 4+ 4+ [] Yes   [] No   Elbow Extension 4+ 4+ [] Yes   [] No     Palpation  [] Min  [x] Mod  [] Severe    Location: infraspinatus   [] Min  [] Mod  [] Severe    Location:  [] Min  [] Mod  [] Severe    Location:    Optional Tests:    Adson's Test  [] Pos   [] Neg Yergason's Test [] Pos   [] Neg  Holly's Test  [] Pos   [] Neg [place]'s Sign [] Pos   [] Neg  Neer's Test  [] Pos   [x] Neg Clunk Test  [] Pos   [] Neg  Hawkin's Test  [] Pos   [x] Neg AC Joint  [] Pos   [] Neg  Speed's Test  [] Pos   [] Neg SC Joint  [] Pos   [] Neg  Empty Can  [] Pos   [] Neg Pectoral Tightness [x] Pos   [] Neg  Anterior Apprehension [] Pos   [] Neg   Posterior Apprehension [] Pos   [] Neg      Other Tests / Comments:   Negative drop arm test  Negative ER lag sign     Pain Level (0-10 scale) post treatment: 0/10    ASSESSMENT/Changes in Function:      [x]  See Plan of Care  []  See progress note/recertification  []  See Discharge Summary         Progress towards goals / Updated goals:  See POC    PLAN  []  Upgrade activities as tolerated     [x] Continue plan of care  []  Update interventions per flow sheet       []  Discharge due to:_  []  Other:_      Riya Tavares, PT 3/29/2021  11:22 AM

## 2021-03-29 NOTE — PROGRESS NOTES
[facility]  [facility]  [address] [place] [address]  [phone] fax    Plan of Care/ Statement of Necessity for Physical Therapy Services    Patient name: Dorcas Ramirez Start of Care: 3/29/2021   Referral source: Mayra Gil MD : 10/8/1930    Medical Diagnosis: Right shoulder pain [M25.511]  Payor: [employer]ICARE / Plan: [facility] MEDICARE CHOICE PPO/PFFS / Product Type: Managed Care Medicare /  Onset Date: 2 years ago    Treatment Diagnosis:  Right shoulder pain   Prior Hospitalization: see medical history Provider#: [id number]   Medications: Verified on Patient summary List    Comorbidities: hearing impaired, COPD   Prior Level of Function: right hand dominant, lives alone, Ind with ADLs      The Plan of Care and following information is based on the information from the initial evaluation. Assessment/ key information:  Pt. Is a 80year old male c/o right shoulder pain that began 2 years ago when putting in a dry wall screw. He reports have decreased strength and mobility since then. He reports not being able to use his arm like he used to with difficulty reaching up into cabinets and writing. He also has difficulty with hammering and using his drill. Denies numbness/tingling in UE but does have tremors. He presents with decreased right shoulder AROM flex: 112 degrees abd: 105 degrees ER at side to 15 degrees, and behind back reaching to L3. He also has decreased right shoulder strength. Negative Hawkin's Eugene test, negative drop arm test, negative ER lag sign. He has firm end feel with PROM with most limitations with IR at 5 degrees. Skilled PT is medically necessary in order to improve right shoulder strength and mobility for increase ease of ADLs and improved quality of life.      Evaluation Complexity History MEDIUM  Complexity : 1-2 comorbidities / personal factors will impact the outcome/ POC ; Examination MEDIUM Complexity : 3 Standardized tests and measures addressing body structure, function, activity limitation and / or participation in recreation  ;Presentation LOW Complexity : Stable, uncomplicated  ;Clinical Decision Making MEDIUM Complexity : FOTO score of 26-74  Overall Complexity Rating: LOW   Problem List: pain affecting function, decrease ROM, decrease strength, decrease ADL/ functional abilitiies, decrease activity tolerance, decrease flexibility/ joint mobility and decrease transfer abilities   Treatment Plan may include any combination of the following: Therapeutic exercise, Therapeutic activities, Neuromuscular re-education, Physical agent/modality, Manual therapy, Patient education, Self Care training, Functional mobility training and Home safety training  Patient / Family readiness to learn indicated by: asking questions  Persons(s) to be included in education: patient (P)  Barriers to Learning/Limitations: None  Patient Goal (s): to get arm back to normal  Patient Self Reported Health Status: good  Rehabilitation Potential: good    Short Term Goals: To be accomplished in 1 weeks:  1. Patient will demonstrate compliance with HEP in order to improve right shoulder AROM for increase ease of ADLs    Long Term Goals: To be accomplished in 4 weeks:  1. Patient will improve FOTO score by 10 points in order to demonstrate a significant improvement in function. 2. Patient will increase AROM in right shoulder flexion and abduction to 135 degrees to reach overhead cabinets with greater ease. 3. Patient will improve right shoulder ER strength to 4+/5 in order to increase ease of ADLs. 4. Patient will improve right shoulder IR PROM to 20 degrees in order to increase ease of getting dressed. Frequency / Duration: Patient to be seen 2 times per week for 4 weeks.     Patient/ Caregiver education and instruction: Diagnosis, prognosis, exercises   [x]  Plan of care has been reviewed with PTA    Certification Period: 3/29/21-4/28/21    Pancho Colmenares, PT 3/29/2021 12:11 PM    ________________________________________________________________________    I certify that the above Therapy Services are being furnished while the patient is under my care. I agree with the treatment plan and certify that this therapy is necessary.     [de-identified] Signature:____________Date:_________TIME:________     Janae Morales MD  ** Signature, Date and Time must be completed for valid certification **    Please sign and return to [facility]  [address] [facility]  [phone] fax

## 2021-04-09 ENCOUNTER — HOSPITAL ENCOUNTER (OUTPATIENT)
Dept: PHYSICAL THERAPY | Age: 86
Discharge: HOME | End: 2021-04-09
Payer: MEDICARE

## 2021-04-09 PROCEDURE — 97110 THERAPEUTIC EXERCISES: CPT

## 2021-04-09 PROCEDURE — 97140 MANUAL THERAPY 1/> REGIONS: CPT

## 2021-04-09 NOTE — PROGRESS NOTES
PT DAILY TREATMENT NOTE     Patient Name: Gavin Coker  Date:2021  : 10/8/1930  [x]  Patient  Verified  Payor: Clay Laura / Plan: [facility] [employer] MEDICARE CHOICE PPO/PFFS / Product Type: Managed Care Medicare /    In time:815  Out time:853  Total Treatment Time (min): 38  Visit #: 2 of 8    Medicare/BCBS Only   Total Timed Codes (min):  38 1:1 Treatment Time:  38       Treatment Area: Right shoulder pain [M25.511]    SUBJECTIVE  Pain Level (0-10 scale): 0/10  Any medication changes, allergies to medications, adverse drug reactions, diagnosis change, or new procedure performed?: [x] No    [] Yes (see summary sheet for update)  Subjective functional status/changes:   [] No changes reported  Pt stated that he is doing pretty good today and has no pain    OBJECTIVE    23 min Therapeutic Exercise:  [x] See flow sheet :   Rationale: increase ROM and increase strength to improve the patients ability to increase ease with ADLs    15 min Manual Therapy:  PROM with oscillations, GH mobs   The manual therapy interventions were performed at a separate and distinct time from the therapeutic activities interventions. Rationale: increase ROM and increase tissue extensibility to increase ease with ADLs    With   [x] TE   [] TA   [] neuro   [] other: Patient Education: [x] Review HEP    [] Progressed/Changed HEP based on:   [] positioning   [] body mechanics   [] transfers   [] heat/ice application    [] other:      Other Objective/Functional Measures:   Had difficulty with wall exercises due to lack of ext in the right elbow  No complaint of pain during session   Had improved range of motion after mobs and PROM     Pain Level (0-10 scale) post treatment: 0/10    ASSESSMENT/Changes in Function:   Initiated therex today per flow sheet. Pt reported compliance with HEP.  Pt put forth good effort with all exercises    Patient will continue to benefit from skilled PT services to modify and progress therapeutic interventions, address functional mobility deficits, address ROM deficits, address strength deficits, analyze and address soft tissue restrictions, analyze and cue movement patterns, analyze and modify body mechanics/ergonomics, assess and modify postural abnormalities and instruct in home and community integration to attain remaining goals. [x]  See Plan of Care  []  See progress note/recertification  []  See Discharge Summary         Progress towards goals / Updated goals:  Short Term Goals: To be accomplished in 1 weeks:  1. Patient will demonstrate compliance with HEP in order to improve right shoulder AROM for increase ease of ADLs  Goal met. Pt reported compliance with HEP. 4/9/21    Long Term Goals: To be accomplished in 4 weeks:  1. Patient will improve FOTO score by 10 points in order to demonstrate a significant improvement in function. 2. Patient will increase AROM in right shoulder flexion and abduction to 135 degrees to reach overhead cabinets with greater ease. 3. Patient will improve right shoulder ER strength to 4+/5 in order to increase ease of ADLs. 4. Patient will improve right shoulder IR PROM to 20 degrees in order to increase ease of getting dressed.      PLAN  []  Upgrade activities as tolerated     [x]  Continue plan of care  []  Update interventions per flow sheet       []  Discharge due to:_  []  Other:_      Jung Donohue, PTA 4/9/2021  8:07 AM    Future Appointments   Date Time Provider Davina King   4/9/2021  8:15 AM Miguel Cornejo PTA [facility]   4/12/2021  4:30 PM Lea Melanie, PT [facility]   4/14/2021  9:00 AM Lea Melanie, PT [facility]   4/19/2021  4:30 PM Lea Melanie, PT [facility]   4/21/2021  4:30 PM Lea Melanie, PT [facility]   4/28/2021  4:30 PM Lea Melanie, PT [facility]   5/3/2021  9:45 AM Lea Melanie, PT [facility]   5/5/2021  9:45 AM Lea Navarro PT [facility]

## 2021-04-12 ENCOUNTER — HOSPITAL ENCOUNTER (OUTPATIENT)
Dept: PHYSICAL THERAPY | Age: 86
Discharge: HOME | End: 2021-04-12
Payer: MEDICARE

## 2021-04-12 PROCEDURE — 97110 THERAPEUTIC EXERCISES: CPT

## 2021-04-12 PROCEDURE — 97140 MANUAL THERAPY 1/> REGIONS: CPT

## 2021-04-12 NOTE — PROGRESS NOTES
PT DAILY TREATMENT NOTE     Patient Name: Will Cancel  Date:2021  : 10/8/1930  [x]  Patient  Verified  Payor: Beka Jiménez / Plan: JAY\[facility] [place] [facility]"" [employer] MEDICARE CHOICE PPO/PFFS / Product Type: Managed Care Medicare /    In time:4:30  Out time:5:15  Total Treatment Time (min): 45  Visit #: 3 of 8    Medicare/BCBS Only   Total Timed Codes (min):  45 1:1 Treatment Time:  45       Treatment Area: Right shoulder pain [M25.511]    SUBJECTIVE  Pain Level (0-10 scale): 0/10  Any medication changes, allergies to medications, adverse drug reactions, diagnosis change, or new procedure performed?: [x] No    [] Yes (see summary sheet for update)  Subjective functional status/changes:   [] No changes reported  Pt reports that he is not currently in any pain and he has been compliant with his HEP. Reaching overhead into cabinets still causes pt difficulty. OBJECTIVE    37 min Therapeutic Exercise:  [x] See flow sheet :   Rationale: increase ROM and increase strength to improve the patients ability to reach overhead into cabinets with greater ease    8 min Manual Therapy:  Post/inf GH mobs grade III-IV, scap mobs S/L   The manual therapy interventions were performed at a separate and distinct time from the therapeutic activities interventions.   Rationale: decrease pain, increase ROM and increase tissue extensibility to reach overhead into cabinets          With   [x] TE   [] TA   [] neuro   [] other: Patient Education: [x] Review HEP    [] Progressed/Changed HEP based on:   [] positioning   [] body mechanics   [] transfers   [] heat/ice application    [] other:      Other Objective/Functional Measures:   Right shoulder AROM before manual flex: 125 degrees / abd 84 degrees in supine  Right shoulder AROM post manual Flex:128 degress /  abd 130 degrees in supine  Early right shoulder elevation with movements past 90 degrees, was able to decrease this elevation with unilateral arm movements  Weakness in right ER, compensation seen with torso rotation        Pain Level (0-10 scale) post treatment: 0/10    ASSESSMENT/Changes in Function: Pt demonstrates with forward rounded shoulders, which limits his shoulder ROM. Right shoulder weakness also contributes to decreased AROM. Manual interventions  improved his AROM measurements and he was able to reach more overhead. Pt demonstrates with early shoulder elevation on both with bilateral movements, but was able to decrease elevation with unilateral movements. Pt continues to have weakness controlling movements into ER, due to his thoracic posturing. This posturing also limits his overhead activities and AROM past 90 degrees. Pt would benefit from skilled PT to increase right shoulder ROM and strength, so pt may return to PLOF. Patient will continue to benefit from skilled PT services to modify and progress therapeutic interventions, address functional mobility deficits, address ROM deficits, address strength deficits and analyze and address soft tissue restrictions to attain remaining goals. [x]  See Plan of Care  []  See progress note/recertification  []  See Discharge Summary         Progress towards goals / Updated goals:  1. Patient will improve FOTO score by 10 points in order to demonstrate a significant improvement in function. 2. Patient will increase AROM in right shoulder flexion and abduction to 135 degrees to reach overhead cabinets with greater ease. Not met: flex: 125 degrees abd: 84 degrees (4/12/21)  3. Patient will improve right shoulder ER strength to 4+/5 in order to increase ease of ADLs.    4. Patient will improve right shoulder IR PROM to 20 degrees in order to increase ease of getting dressed.     PLAN  [x]  Upgrade activities as tolerated     [x]  Continue plan of care  []  Update interventions per flow sheet       []  Discharge due to:_  []  Other:_      CYNTHIA Saeed 4/12/2021  11:06 AM    I was present during the entire treatment, directing and participating in the treatment.    John Stewart DPT      Future Appointments   Date Time Provider Davina Christine   4/12/2021  4:30 PM Yaz Ho, PT [facility]   4/14/2021  9:00 AM Yaz Ho, PT [facility]   4/19/2021  4:30 PM Yaz Ho, PT [facility]   4/21/2021  4:30 PM Yaz Ho, PT [facility]   4/28/2021  4:30 PM [facility] Ho[facility]   5/3/2021  9:45 AM Yaz Ho, [facility]   5/5/2021  9:45 AM Yaz Ho, PT [facility]

## 2021-04-14 ENCOUNTER — HOSPITAL ENCOUNTER (OUTPATIENT)
Dept: PHYSICAL THERAPY | Age: 86
Discharge: HOME | End: 2021-04-14
Payer: MEDICARE

## 2021-04-14 PROCEDURE — 97140 MANUAL THERAPY 1/> REGIONS: CPT

## 2021-04-14 PROCEDURE — 97110 THERAPEUTIC EXERCISES: CPT

## 2021-04-14 NOTE — PROGRESS NOTES
PT DAILY TREATMENT NOTE     Patient Name: Laney Jacob  Date:2021  : 10/8/1930  [x]  Patient  Verified  Payor: Mikey Lim / Plan: [facility] MEDICARE CHOICE PPO/PFFS / Product Type: Managed Care Medicare /    In time: 9:00  Out time: 9:45  Total Treatment Time (min): 45  Visit #: 4 of 8    Medicare/BCBS Only   Total Timed Codes (min):  45 1:1 Treatment Time:  45       Treatment Area: Right shoulder pain [M25.511]    SUBJECTIVE  Pain Level (0-10 scale): 0/10  Any medication changes, allergies to medications, adverse drug reactions, diagnosis change, or new procedure performed?: [x] No    [] Yes (see summary sheet for update)  Subjective functional status/changes:   [] No changes reported  Pt states that he isn't in any pain and is compliant with his HEP. OBJECTIVE    37 min Therapeutic Exercise:  [x] See flow sheet :   Rationale: increase ROM and increase strength to improve the patients ability to perform ADL's with greater ease    8 min Manual Therapy:  Post/ inf GH mobs grades III-IV, left sidelying scap mobs   The manual therapy interventions were performed at a separate and distinct time from the therapeutic activities interventions. Rationale: increase ROM and increase tissue extensibility to reach overhead with greater ease          With   [] TE   [] TA   [] neuro   [] other: Patient Education: [x] Review HEP    [] Progressed/Changed HEP based on:   [] positioning   [] body mechanics   [] transfers   [] heat/ice application    [] other:      Other Objective/Functional Measures:   Seated AROM left shoulder: 110 degrees flexion / 112 degrees abduction (post treatment: 118 degrees)  Right Scapular hypomobility  Thoracic kyphosis which limits shoulder elevation       Pain Level (0-10 scale) post treatment: 0/10    ASSESSMENT/Changes in Function: Pt in making good progress with ROM goals, as evident by an increase in right shoulder AROM flexion and abduction post treatment.  Forward flexed posturing secondary to thoracic kyphosis, and right scapular hypomobility continues to limit pt with AROM shoulder mobility past 90 degrees. AAROM flexion and abduction, pt is able to have an increase in ROM. Cueing needed to isolate IR/ER scapular musculature, as he continues to be weak here, having decreased eccentric control. Pt would benefit from continued skilled PT to address decreased right shoulder strength, ROM and increase posturing to assist pt with greater ease of ADL's. Patient will continue to benefit from skilled PT services to modify and progress therapeutic interventions, address functional mobility deficits, address ROM deficits, address strength deficits, analyze and address soft tissue restrictions and analyze and modify body mechanics/ergonomics to attain remaining goals. [x]  See Plan of Care  []  See progress note/recertification  []  See Discharge Summary         Progress towards goals / Updated goals:  1. Patient will improve FOTO score by 10 points in order to demonstrate a significant improvement in function. 2. Patient will increase AROM in right shoulder flexion and abduction to 135 degrees to reach overhead cabinets with greater ease. Not met: right sh flex: 125 degrees / right sh abd: 118 degrees (4/14/21)  3. Patient will improve right shoulder ER strength to 4+/5 in order to increase ease of ADLs. 4. Patient will improve right shoulder IR PROM to 20 degrees in order to increase ease of getting dressed.     PLAN  [x]  Upgrade activities as tolerated     [x]  Continue plan of care  []  Update interventions per flow sheet       []  Discharge due to:_  [x]  Other: Manual to right pecs to increase tissue extensibility       CYNTHIA Koehler 4/14/2021  7:30 AM  I was present during the entire treatment, directing and participating in the treatment.    Liu Wayne DPT      Future Appointments   Date Time Provider Davina King   4/14/2021  9:00 AM Patricia Richmond, PT [facility]   4/19/2021  4:30 PM Patricia Richmond, PT [facility]   4/21/2021  4:30 PM Patricia Richmond, PT [facility]   4/28/2021  4:30 PM Patricia Richmond, PT [facility]   5/3/2021  9:45 AM Patricia Richmond, PT [facility]   5/5/2021  9:45 AM Patricia Richmond, PT [facility]

## 2021-04-19 ENCOUNTER — HOSPITAL ENCOUNTER (OUTPATIENT)
Dept: PHYSICAL THERAPY | Age: 86
Discharge: HOME | End: 2021-04-19
Payer: MEDICARE

## 2021-04-19 PROCEDURE — 97140 MANUAL THERAPY 1/> REGIONS: CPT

## 2021-04-19 PROCEDURE — 97110 THERAPEUTIC EXERCISES: CPT

## 2021-04-19 NOTE — PROGRESS NOTES
PT DAILY TREATMENT NOTE     Patient Name: Noni Gaston  Date:2021  : 10/8/1930  [x]  Patient  Verified  Payor: Edward Quinonez / Plan: [facility] [employer] MEDICARE CHOICE PPO/PFFS / Product Type: Managed Care Medicare /     In time:4:30   Out time: 5:13  Total Treatment Time (min): 43  Visit #: 5 of 8    Medicare/BCBS Only   Total Timed Codes (min):  43 1:1 Treatment Time:  43       Treatment Area: Right shoulder pain [M25.511]    SUBJECTIVE  Pain Level (0-10 scale): 0/10  Any medication changes, allergies to medications, adverse drug reactions, diagnosis change, or new procedure performed?: [x] No    [] Yes (see summary sheet for update)  Subjective functional status/changes:   [] No changes reported  Pt reports that he is 0/10 pain currently and is compliant with his HEP. He states that he wants to continue to strengthening his right shoulder to avoid surgery. OBJECTIVE    33 min Therapeutic Exercise:  [x] See flow sheet :   Rationale: increase ROM and increase strength to improve the patients ability to perform ADL's with greater ease    10 min Manual Therapy:  Right Post/ inf GH mobs grades III-IV, left sidelying scap mobs   The manual therapy interventions were performed at a separate and distinct time from the therapeutic activities interventions.   Rationale: increase ROM and increase tissue extensibility to reach overhead with greater ease          With   [] TE   [] TA   [] neuro   [] other: Patient Education: [x] Review HEP    [] Progressed/Changed HEP based on:   [] positioning   [] body mechanics   [] transfers   [] heat/ice application    [] other:      Other Objective/Functional Measures:   Decreased thoracic extension with trunk extensions on chair, modified to wall (B) shoulder flexion with chest to wall  Cueing needed for scapular retraction and decrease shoulder rounding in standing   Cueing needed for shoulder IR/ER to stabilize and not rotate through torso             Pain Level (0-10 scale) post treatment:  0/10    ASSESSMENT/Changes in Function:   Pt continues to make slow progress towards goals, as posturing and decreased shoulder ROM continue to be hindering. Pt has kyphotic posturing but lacks thoracic extension mobility with targeted exercises, as modifications are needed. Due to decreased shoulder ROM, thoracic extension isn't easily able to be achieved. Pt continues to demonstrate decreased scapular mobility, secondary to posturing. Pt would benefit from increased right shoulder ER/IR strengthening, to limit rotation through the torso and isolate right shoulder movements. Patient will continue to benefit from skilled PT services to modify and progress therapeutic interventions, address functional mobility deficits, address ROM deficits, address strength deficits, analyze and address soft tissue restrictions and analyze and modify body mechanics/ergonomics to attain remaining goals. [x]  See Plan of Care  []  See progress note/recertification  []  See Discharge Summary         Progress towards goals / Updated goals:  1. Patient will improve FOTO score by 10 points in order to demonstrate a significant improvement in function. 2. Patient will increase AROM in right shoulder flexion and abduction to 135 degrees to reach overhead cabinets with greater ease. Not met: right sh flex: 125 degrees / right sh abd: 118 degrees (4/14/21)  3. Patient will improve right shoulder ER strength to 4+/5 in order to increase ease of ADLs. 4. Patient will improve right shoulder IR PROM to 20 degrees in order to increase ease of getting dressed.     PLAN  [x]  Upgrade activities as tolerated     [x]  Continue plan of care  []  Update interventions per flow sheet       []  Discharge due to:_  []  Other:       CYNTHIA Crowley 4/19/2021  7:30 AM  I was present during the entire treatment, directing and participating in the treatment.    Sara Rios DPIDANE      Future Appointments   Date Time Provider Davina King   4/19/2021  4:30 PM Para Brace, PT [facility]   4/21/2021  4:30 PM Para Brace, PT [facility]   4/28/2021  4:30 PM Para Brace, PT [facility]   5/3/2021  9:45 AM Para Brace, PT [facility]   5/5/2021  9:45 AM Para Brace, PT [facility]

## 2021-04-21 ENCOUNTER — HOSPITAL ENCOUNTER (OUTPATIENT)
Dept: PHYSICAL THERAPY | Age: 86
Discharge: HOME | End: 2021-04-21
Payer: MEDICARE

## 2021-04-21 PROCEDURE — 97110 THERAPEUTIC EXERCISES: CPT

## 2021-04-21 PROCEDURE — 97140 MANUAL THERAPY 1/> REGIONS: CPT

## 2021-04-21 NOTE — PROGRESS NOTES
PT DAILY TREATMENT NOTE     Patient Name: Anneliese Landon  Date:2021  : 10/8/1930  [x]  Patient  Verified  Payor: Farhan Ramos / Plan: [facility] [employer] MEDICARE CHOICE PPO/PFFS / Product Type: Managed Care Medicare /    In time:4:27  Out time: 5:13  Total Treatment Time (min): 46  Visit #: 6 of 8    Medicare/BCBS Only   Total Timed Codes (min):  46 1:1 Treatment Time:  46       Treatment Area: Right shoulder pain [M25.511]    SUBJECTIVE  Pain Level (0-10 scale): 0/10  Any medication changes, allergies to medications, adverse drug reactions, diagnosis change, or new procedure performed?: [x] No    [] Yes (see summary sheet for update)  Subjective functional status/changes:   [] No changes reported  Pt reports that he feels the same and doesn't feel PT is helping much. He sees the MD on 21 to discuss further action forward. OBJECTIVE    36 min Therapeutic Exercise:  [x] See flow sheet :   Rationale: increase ROM and increase strength to improve the patients ability to improve strength in right arm for overhead lifting    10 min Manual Therapy:  [address] grades III-IV, [facility]   The manual therapy interventions were performed at a separate and distinct time from the therapeutic activities interventions. Rationale: increase ROM and increase tissue extensibility to increase GH mobility for ADLs          With   [] TE   [] TA   [] neuro   [] other: Patient Education: [x] Review HEP    [] Progressed/Changed HEP based on:   [] positioning   [] body mechanics   [] transfers   [] heat/ice application    [] other:      Other Objective/Functional Measures: FOTO: 64  AROM right shoulder flexion: 134 degrees   AROM right shoulder abduction: 98 degrees   MMT right shoulder ER: 4+/5  PROM right shoulder IR: 12 degrees    Pain Level (0-10 scale) post treatment: 0/10    ASSESSMENT/Changes in Function: See progress note.     Patient will continue to benefit from skilled PT services to modify and progress therapeutic interventions, address functional mobility deficits, address ROM deficits, address strength deficits, analyze and address soft tissue restrictions, analyze and cue movement patterns and analyze and modify body mechanics/ergonomics to attain remaining goals. []  See Plan of Care  [x]  See progress note/recertification  []  See Discharge Summary         Progress towards goals / Updated goals:  1. Patient will improve FOTO score by 10 points in order to demonstrate a significant improvement in function. FOTO: 64 -  4/21/21  2. Patient will increase AROM in right shoulder flexion and abduction to 135 degrees to reach overhead cabinets with greater ease. Not met: right sh flex: 125 degrees / right sh abd: 118 degrees (4/14/21)  Met AROM right shoulder flexion: 134 degrees (4/21/21) / Not met: AROM right shoulder abduction: 98 degrees (4/21/21)  3. Patient will improve right shoulder ER strength to 4+/5 in order to increase ease of ADLs. Met: 4/21/21  4. Patient will improve right shoulder IR PROM to 20 degrees in order to increase ease of getting dressed.   Not met: 12 degrees (4/21/21)  PLAN  [x]  Upgrade activities as tolerated     [x]  Continue plan of care  []  Update interventions per flow sheet       []  Discharge due to:_  []  Other:_      CYNTHIA Bonilla 4/21/2021  3:54 PM  I was present during the entire treatment, directing and participating in the treatment.    Junior Campos DPT      Future Appointments   Date Time Provider Davina King   4/21/2021  4:30 PM [facility]   4/28/2021  4:30 PM Soheila George, SUE [facility]   5/3/2021  9:45 AM Soheila George PT [facility]   5/5/2021  9:45 AM Soheila George PT [facility]

## 2021-04-21 NOTE — PROGRESS NOTES
[facility]  [facility] [employer] [facility]  [address] [place] [address]  [phone] fax    Continued Plan of Care/ Re-certification for Physical Therapy Services  Patient name: Abisai Aguero Start of Care: 3/29/2021   Referral source: Guzman Blackburn MD : 10/8/1930               Medical Diagnosis: Right shoulder pain [M25.511]  Payor: [employer] MEDICARE / Plan: [facility] MEDICARE CHOICE PPO/PFFS / Product Type: Managed Care Medicare /  Onset Date: 2 years ago               Treatment Diagnosis:  Right shoulder pain   Prior Hospitalization: see medical history Provider#: [id number]   Medications: Verified on Patient summary List    Comorbidities: hearing impaired, COPD   Prior Level of Function: right hand dominant, lives alone, Ind with ADLs  Visits from Start of Care: 6    Missed Visits: 0    The Plan of Care and following information is based on the patient's current status:  Goal: Patient will improve FOTO score by 10 points in order to demonstrate a significant improvement in function. Status at last note/certification: 53  Current Status: not met    Goal: Patient will increase AROM in right shoulder flexion and abduction to 135 degrees to reach overhead cabinets with greater ease. Status at last note/certification: Right shoulder flexion: 112                Right shoulder abduction: 105  Current Status: not met    Goal: Patient will improve right shoulder ER strength to 4+/5 in order to increase ease of ADLs.   Status at last note/certification: 4/5  Current Status: not met    Goal: Patient will improve right shoulder IR PROM to 20 degrees in order to increase ease of getting dressed  Status at last note/certification: 5 degrees  Current Status: not met    Key functional changes: Increased AROM right shoulder flexion, abduction     Problems/ barriers to goal attainment: Thoracic kyphosis, forward head, rounded shoulders    Problem List: decrease ROM, decrease strength, decrease ADL/ functional abilitiies and decrease flexibility/ joint mobility    Treatment Plan: Therapeutic exercise, Manual therapy, Patient education and Functional mobility training     Patient Goal (s) has been updated and includes:   Lifting with right arm during ADL's      Goals for this certification period to be accomplished in 4 weeks:.  1. Patient will improve AROM right shoulder abduction to 105 degrees to help with greater ease in dressing  2. Patient will report little difficultly when performing heavy household chores   3. Patient will improve right shoulder IR PROM to 20 degrees in order to increase ease of getting dressed.     Frequency / Duration: Patient to be seen 2 times per week for 4 weeks:    Assessment / Recommendations: Mr. Kareem Yeung has been seen for 6 OP PT visits for right shoulder pain. He is progressing towards his goals as AROM right shoulder flexion and abduction are 134 degrees and 98 degrees respectively. MMT for right shoulder ER has increased since evaluation to 4+/5, and PROM right shoulder IR has increased to 12 degrees. Pt continues to be limited in overhead ROM and strength as this is a remaining goal he has for himself, as pain has never been a limiting factor. FOTO increased 10 points since [facility]. Skilled PT continues to be medically necessary to increase thoracic and shoulder mobility, as well as increase shoulder strength so pt may return to PLOF. Certification Period: 4/21/21-5/20/21    Boyd [address] 4/21/2021 3:54 PM    ________________________________________________________________________  I certify that the above Therapy Services are being furnished while the patient is under my care. I agree with the treatment plan and certify that this therapy is necessary. [] I have read the above and request that my patient continue as recommended.   [] I have read the above report and request that my patient continue therapy with the following changes/special instructions: _______________________________________  [] I have read the above report and request that my patient be discharged from therapy    Physician's Signature:____________Date:_________TIME:________     Chela Pena MD  ** Signature, Date and Time must be completed for valid certification **    Please sign and return to [facility] [address]  [phone] fax

## 2021-04-28 ENCOUNTER — APPOINTMENT (OUTPATIENT)
Dept: PHYSICAL THERAPY | Age: 86
End: 2021-04-28
Payer: MEDICARE

## 2021-04-28 NOTE — PROGRESS NOTES
[facility] [place]  [address] [facility]  [phone] fax    Physical Therapy Discharge Summary    Patient name: Bismark Rico Start of Care: 3/29/2021   Referral source: Anne-Marie Valente MD : 10/8/1930               Medical Diagnosis: Right shoulder pain [M25.511]  Payor: Michial Every / Plan: [facility] MEDICARE CHOICE PPO/PFFS / Product Type: Managed Care Medicare /  Onset Date: 2 years ago               Treatment Diagnosis:  Right shoulder pain   Prior Hospitalization: see medical history Provider#: [id number]   Medications: Verified on Patient summary List    Comorbidities: hearing impaired, COPD   Prior Level of Function: right hand dominant, lives alone, Ind with ADLs  Visits from [place] of Care: 6                                              Missed Visits: 0      Reporting Period :  21 to 21    Summary of Care:  Goal: Patient will improve AROM right shoulder abduction to 105 degrees to help with greater ease in dressing  Status at last note/certification: 98 degrees  Status at discharge: not met    Goal: Patient will report little difficultly when performing heavy household chores   Status at last note/certification: some difficulty   Status at discharge: not met    Goal: Patient will improve right shoulder IR PROM to 20 degrees in order to increase ease of getting dressed.   Status at last note/certification: 12 degrees  Status at discharge: not met    Pt. Did not return to PT after last progress note. Pt. D/C secondary to physician recommendation. Per last progress note \"Mr. Samir Lewis has been seen for 6 OP PT visits for right shoulder pain. He is progressing towards his goals as AROM right shoulder flexion and abduction are 134 degrees and 98 degrees respectively. MMT for right shoulder ER has increased since evaluation to 4+/5, and PROM right shoulder IR has increased to 12 degrees.  Pt continues to be limited in overhead ROM and strength as this is a remaining goal he has for himself, as pain has never been a limiting factor. FOTO increased 10 points since [facility]. Skilled PT continues to be medically necessary to increase thoracic and shoulder mobility, as well as increase shoulder strength so pt may return to PLOF. \"      ASSESSMENT/RECOMMENDATIONS:  [x]Discontinue therapy: []Patient has reached or is progressing toward set goals      []Patient is non-compliant or has abdicated      [x]Due to lack of appreciable progress towards set goals    Keira Fontaine, PT 4/28/2021 7:54 AM

## 2021-05-13 NOTE — PROGRESS NOTES
PT DAILY TREATMENT NOTE     Patient Name: Bismark Mendiola  Date:3/5/2021  : 10/8/1930  [x]  Patient  Verified  Payor: [employer] MEDICARE / Plan: [facility] HUMANA MEDICARE CHOICE PPO/PFFS / Product Type: Managed Care Medicare /    In time:12:03P  Out time:12:41P  Total Treatment Time (min): 38  Visit #: 5 of 8    Medicare/BCBS Only   Total Timed Codes (min):  38 1:1 Treatment Time:  38       Treatment Area: Right knee pain [M25.561]    SUBJECTIVE  Pain Level (0-10 scale): 0/10  Any medication changes, allergies to medications, adverse drug reactions, diagnosis change, or new procedure performed?: [x] No    [] Yes (see summary sheet for update)  Subjective functional status/changes:   [] No changes reported    Patient reports he feels about the same.     OBJECTIVE      23 min Therapeutic Exercise:  [x] See flow sheet :   Rationale: increase ROM, increase strength and improve coordination to improve the patient’s ability to perform ADLs with increased ease      15 min Manual Therapy:  Grade III/IV patellar mobs all directions, AP tibial grade III/IV, tibial distraction with flex at EOB    The manual therapy interventions were performed at a separate and distinct time from the therapeutic activities interventions.  Rationale: decrease pain, increase ROM and increase tissue extensibility to perform ADLs with increased ease              With   [] TE   [] TA   [] neuro   [] other: Patient Education: [x] Review HEP    [] Progressed/Changed HEP based on:   [] positioning   [] body mechanics   [] transfers   [] heat/ice application    [] other:      Other Objective/Functional Measures:     Fair control with eccentric step downs     Pain Level (0-10 scale) post treatment: 0/10    ASSESSMENT/Changes in Function: Patient continues to make slow progress towards goals. Active and passive knee ROM is improving but continues to remain limited. Patient continues to report difficulty with descending stairs. He denies pain with  activities and continues to be diligent in performing HEP. Patient will continue to benefit from skilled PT services to modify and progress therapeutic interventions, address functional mobility deficits, address ROM deficits, address strength deficits, analyze and address soft tissue restrictions, analyze and cue movement patterns, analyze and modify body mechanics/ergonomics, assess and modify postural abnormalities, address imbalance/dizziness and instruct in home and community integration to attain remaining goals. Progress towards goals / Updated goals:  1. Patient will improve right knee AROM 2-105 degrees in order to increase ease of descending stairs   progressin-99 degrees (3/3/21)  2. Patient will improve right knee MMT to 5/5 in order to increase ease of ADLs.   3. Patient will improve 30 second sit to stand test to 8x in order to increase ease of transfers at home.     PLAN  [x]  Upgrade activities as tolerated     [x]  Continue plan of care  []  Update interventions per flow sheet       []  Discharge due to:_  []  Other:_      Vel Mccormack PT 3/5/2021  8:49 AM    Future Appointments   Date Time Provider Davina King   3/5/2021 12:00 PM Domitila Valdivia [facility]   3/10/2021 12:00 PM Bety Raines [facility]   3/12/2021  1:30 PM Tc Ward PT [facility]   3/17/2021 12:45 PM Tc Ward PT [facility]   3/19/2021 11:15 AM Joana Dandy, PT [facility] Cephalexin Pregnancy And Lactation Text: This medication is Pregnancy Category B and considered safe during pregnancy.  It is also excreted in breast milk but can be used safely for shorter doses.

## 2022-01-11 ENCOUNTER — HOSPITAL ENCOUNTER (OUTPATIENT)
Dept: PHYSICAL THERAPY | Age: 87
Discharge: HOME | End: 2022-01-11
Payer: MEDICARE

## 2022-01-11 PROCEDURE — 97530 THERAPEUTIC ACTIVITIES: CPT

## 2022-01-11 PROCEDURE — 97162 PT EVAL MOD COMPLEX 30 MIN: CPT

## 2022-01-11 NOTE — PROGRESS NOTES
PT DAILY TREATMENT NOTE/SHOULDER EVAL     Patient Name: Manuela Barrett  Date:2022  : 10/8/1930  [x]  Patient  Verified  Payor: Rahat Schulzh / Plan: [facility] [employer] MEDICARE CHOICE PPO/PFFS / Product Type: Managed Care Medicare /    In time:12:45  Out time:1:30  Total Treatment Time (min): 45  Visit #: 1 of 12    Medicare/BCBS Only   Total Timed Codes (min):  10 1:1 Treatment Time:  30       Treatment Area: Right shoulder pain [M25.511]    SUBJECTIVE  Pain Level (0-10 scale): 2/10  []constant []intermittent []improving []worsening []no change since onset    Any medication changes, allergies to medications, adverse drug reactions, diagnosis change, or new procedure performed?: [x] No    [] Yes (see summary sheet for update)  Subjective functional status/changes:     Pt is a 81 yo M who presents s/p right shoulder TSA 21. He reports compliance with sling use for 4 weeks post-op, and his first day without the sling was yesterday. He has had a lot of swelling in his hand and arm since surgery. He had a DVT ruled out on 21. Venous Exam Results per Electronic Medical Record 21  Procedure Note    Alysa Sanz MD - 2021   Formatting of this note might be different from the original.   Right: The deep venous system of the right upper extremity was examined using duplex ultrasound. Technically difficult study due to edema,  pain, suboptimal position, unable to abduct arm secondary to should replacement. B-mode imaging demonstrates normal compressibility of the internal jugular, subclavian, axillary, brachial, basilic, and cephalic veins.  There is no thrombus identified. Doppler flow signals are spontaneous and pulsatile at all levels.  Limited study of the left internal jugular and subclavian veins demonstrated normal compressibility with spontaneous and pulsatile Doppler flow signals.    Conclusions: No evidence of deep venous thrombosis in the right upper extremity. Barriers: []pain []financial []time []transportation []other  Motivation: high  Substance use: []Alcohol []Tobacco []other:   FABQ Score: []low []elevate  Cognition: A & O x 4    Other:    OBJECTIVE/EXAMINATION    15 minutes for phone call with MD's office       20 min [x]Eval                  []Re-Eval       10 min Therapeutic Activity:  []  See flow sheet : patient education    Rationale:  To  Optimize pt safety and understanding             With   [] TE   [x] TA   [] neuro   [] other: Patient Education: [x] Review HEP    [] Progressed/Changed HEP based on:   [] positioning   [] body mechanics   [] transfers   [] heat/ice application    [x] other: reviewed how dependent position of limb and disuse in sling can promote swelling, reviewed signs/sx of infection/DVT and go to emergency room and call MD if signs/sx, instructed pt to keep steri strips on until they fall off, reviewed progression through protocol and therapy plan, ice use 10-15 minutes      Other Objective/Functional Measures:     /90 taken manually prior to therapy     Physical Therapy Evaluation - Shoulder    Posture: [] Poor    [x] Fair    [] Good    Describe: forward head     ROM:  [x] Unable to assess right shoulder AROM at this time                                              PROM   Right   Shoulder Flexion 71   Elbow Extension  72 from neutral     Decreased left shoulder overhead AROM    End Feel / Painful Arc: empty     Strength:   [x] Unable to assess at this time                                                                             Optional Tests:      Incision healing without signs of complication, steri strips intact    Significant swelling evident right hand, shiny appearance of skin  2cm girth different when compared to left hand, measured at MCP joints   Pitting edema distal right UE, no pitting edema left UE  Red discoloration of right lateral forearm    Well's Clinical Prediction Rules:  (+) Paralysis, paresis or recent immobilization of LE 1  (+) Recently bedridden for > 3 days or major surgery within 4 weeks 1  (+) Entire LE swelling 1  (+) Pitting edema (greater in symptomatic LE) 1  (+) Alternative diagnosis as likely or greater than that of proximal DVT:  cellulitis, calf strain, Baker cyst and postoperative swelling. Total Score: 2, moderate risk of DVT   Of note, Well's clinical prediction findings were used for UE assessment rather than LE assessment   Alternative diagnosis is at least as likely as DVT given pt's negative venous exam 12/22/21      Other Tests / Comments:   Therapist called MD's office and spoke with Radha from Dr. Supriya White clinical team.  Reviewed therapy findings and asked for comparison of right UE appearance compared to pt's follow up with MD on 1/3/22. Asked for MD's recommendation regarding recommendation for therapy. MD's office instructed to continue therapy, but they are going to schedule a follow up with pt soon. They will call pt to schedule    Informed pt and son regarding MD's office's message. Held further evaluation/treatment this visit d/t time constraint and therapist's concern for possible DVT. Will plan to initiate HEP next visit after pt follows up with MD          Pain Level (0-10 scale) post treatment: 2/10    ASSESSMENT/Changes in Function: See POC    Patient will continue to benefit from skilled PT services to modify and progress therapeutic interventions, address ROM deficits, address strength deficits, analyze and address soft tissue restrictions, analyze and cue movement patterns, analyze and modify body mechanics/ergonomics, assess and modify postural abnormalities and instruct in home and community integration to attain remaining goals.      [x]  See Plan of Care  []  See progress note/recertification  []  See Discharge Summary         Progress towards goals / Updated goals:  See POC    PLAN  [x]  Upgrade activities as tolerated     []  Continue plan of care  [x]  Update interventions per flow sheet       []  Discharge due to:_  []  Other:_      Janae Soto, PT 1/11/2022  12:53 PM

## 2022-01-11 NOTE — PROGRESS NOTES
[facility]  [place] [employer] [facility]  [address] [place] [address]  [phone] fax    Plan of Care/ Statement of Necessity for Physical Therapy Services    Patient name: Roc Marquez Start of Care: 2022   Referral source: Ed Long : 10/8/1930    Medical Diagnosis: Right shoulder pain [M25.511]  Payor: HU[employer]NA MEDICARE / Plan: [facility] MEDICARE CHOICE PPO/PFFS / Product Type: Managed Care Medicare /  Onset Date:21    Treatment Diagnosis: Right Shoulder Pain    Prior Hospitalization: see medical history Provider#: [id number]   Medications: Verified on Patient summary List    Comorbidities: HTN, visual impairment, hearing impairment, history of L3 compression fracture    Prior Level of Function: lives alone (currently staying with daughter), right-handed, Ind with ADL's       The Plan of Care and following information is based on the information from the initial evaluation. Assessment/ key information: Pt is a 81 yo M who presents s/p right shoulder TSA 21. He reports compliance with sling use for 4 weeks post-op, and his first day without the sling was yesterday. He has had a lot of swelling in his hand and arm since surgery. He had a DVT ruled out on 21. Right shoulder flexion PROM is 71*. He presents with an elbow flexion contracture of 72* from extension passively. Swelling is evident right elbow, and pitting edema and significant swelling is evident right hand, with 2cm girth difference at MCP's when compared B. Red discoloration is also evident right forearm. Notified MD regarding concerns of DVT, and MD's office instructed to continue with therapy, but they will follow up with pt. Pt will benefit from skilled PT to address strength, ROM, and flexibility deficits in order to increase functional use of right UE and allow for return to PLOF.          Vasopnuematic compression justification:  Per bilateral girth measures taken and listed above the edema is considered significant and having an impact on the patient's self care and ADLs     Evaluation Complexity History HIGH Complexity :3+ comorbidities / personal factors will impact the outcome/ POC ; Examination MEDIUM Complexity : 3 Standardized tests and measures addressing body structure, function, activity limitation and / or participation in recreation  ;Presentation MEDIUM Complexity : Evolving with changing characteristics  ; Clinical Decision Making MEDIUM Complexity : FOTO score of 26-74  Overall Complexity Rating: MEDIUM  Problem List: pain affecting function, decrease ROM, decrease strength, edema affecting function, impaired gait/ balance, decrease ADL/ functional abilitiies, decrease activity tolerance, decrease flexibility/ joint mobility and decrease transfer abilities   Treatment Plan may include any combination of the following: Therapeutic exercise, Therapeutic activities, Neuromuscular re-education, Physical agent/modality, Gait/balance training, Aquatic therapy, Patient education, Self Care training, Functional mobility training, Home safety training and Stair training  Patient / Family readiness to learn indicated by: asking questions, trying to perform skills and interest  Persons(s) to be included in education: patient (P) and family support person (FSP);list daughter, son  Barriers to Learning/Limitations: yes;  sensory deficits-vision/hearing/speech  Patient Goal (s): be able to use arm  Patient Self Reported Health Status: fair  Rehabilitation Potential: fair    Short Term Goals: To be accomplished in 1 weeks:  1. Pt will be compliant with initial HEP in order to optimize therapy outcomes. Long Term Goals: To be accomplished in 4 weeks:  1. Pt will improve FOTO by 29 points in order to demonstrate functional improvement. 2.  Pt will report 50% improvement in symptoms in order to demonstrate improvement in QOL.   3.  Pt will improve PROM flexion to 120* in order to progress towards reaching overhead with ADL's.    4.  Pt will demonstrate hand girth measurement within 1cm difference when compared B at MCP's in order to increase functional use of right hand. 5.  Pt will improve right elbow extension AROM to 20* in order to improve ability with future reaching. 6.  Pt will report moderate difficulty with lower extremity dressing (once cleared for use of right UE with ADL's) in order to maximize independence. Frequency / Duration: Patient to be seen 2-3 times per week for 4 weeks. Patient/ Caregiver education and instruction: Diagnosis, prognosis, exercises   [x]  Plan of care has been reviewed with PTA    Certification Period: 1/11/22 to 2/9/22  Tessa Chowdhury, PT 1/11/2022 12:53 PM    ________________________________________________________________________    I certify that the above Therapy Services are being furnished while the patient is under my care. I agree with the treatment plan and certify that this therapy is necessary.     [de-identified] Signature:____________Date:_________TIME:________     Zoya Villareal PA-C  ** Signature, Date and Time must be completed for valid certification **    Please sign and return to [facility] [address]  [phone] fax

## 2022-01-12 ENCOUNTER — TELEPHONE (OUTPATIENT)
Dept: PHYSICAL THERAPY | Age: 87
End: 2022-01-12

## 2022-01-14 ENCOUNTER — APPOINTMENT (OUTPATIENT)
Dept: PHYSICAL THERAPY | Age: 87
End: 2022-01-14
Payer: MEDICARE

## 2022-01-18 ENCOUNTER — HOSPITAL ENCOUNTER (OUTPATIENT)
Dept: PHYSICAL THERAPY | Age: 87
Discharge: HOME | End: 2022-01-18
Payer: MEDICARE

## 2022-01-18 PROCEDURE — 97110 THERAPEUTIC EXERCISES: CPT

## 2022-01-18 PROCEDURE — 97140 MANUAL THERAPY 1/> REGIONS: CPT

## 2022-01-18 NOTE — PROGRESS NOTES
PT DAILY TREATMENT NOTE    Patient Name: Traci Mix  Date:2022  : 10/8/1930  [x]  Patient  Verified  Payor: Roger Mensah / Plan: [facility] MEDICARE CHOICE PPO/PFFS / Product Type: Managed Care Medicare /    In time: 3:52  Out time: 4:45  Total Treatment Time (min): 48  Visit #: 2 of 12    Medicare/BCBS Only   Total Timed Codes (min): 53 1:1 Treatment Time: 53       Treatment Area: Right shoulder pain [M25.511]    SUBJECTIVE  Pain Level (0-10 scale): 0/10  []constant []intermittent []improving []worsening []no change since onset    Any medication changes, allergies to medications, adverse drug reactions, diagnosis change, or new procedure performed?: [x] No    [] Yes (see summary sheet for update)  Subjective functional status/changes:   Patient reports the swelling is going down and his doctor is not concerned, he believes it is just due to age.     OBJECTIVE/EXAMINATION    Modality rationale: Patient Declined- at home as needed   Min Type Additional Details    [] Estim: []Att   []Unatt  []TENS instruct                 []IFC  []Premod []NMES                       []Other:  []w/US   []w/ice   []w/heat  Position:  Location:    []  Traction: [] Cervical       []Lumbar                       [] Prone          []Supine                       []Intermittent   []Continuous Lbs:  [] before manual  [] after manual    []  Ultrasound: []Continuous   [] Pulsed                           []1MHz   []3MHz Location:  W/cm2:    []  Iontophoresis with dexamethasone         Location: [] Take home patch   [] In clinic    []  Ice     []  heat  []  Ice massage Position:  Location:    []  Vasopneumatic Device:  If using vaso (only need to measure limb vaso being performed on)      pre-treatment girth :       post-treatment girth :       measured at (landmark location)  Pressure: [] lo [] med [] hi   Temp: [] lo [] med [] hi   [] Skin assessment post-treatment:  []intact []redness- no adverse reaction       []redness  adverse reaction:     30 min Therapeutic Exercise:  [x]  See flow sheet : develop HEP    Rationale: increase ROM and increase strength to improve the patients ability to perform functional ADL's.    23 min Manual Therapy: DTM to right UT/LS and manual stretches; edema massage to right hand and forearm, elbow extension stretch for flexion contracture, PROM shoulder flexion - in supine; scapular mobs in left sidelying   Technique:       [x] STM []ASTM [] TPR [x]PROM [x] Stretching  [x] Jt manipulation []Gr I [] II [x]  III [] IV [] V []  Treatment Area: right shoulder, elbow and UE  Other: The manual therapy interventions were performed at a separate and distinct time from the therapeutic activities interventions. Rationale: decrease pain, increase ROM and increase tissue extensibility to allow patient to perform functional reaching tasks. With   [x] TE   [] TA   [] neuro   [] other: Patient Education: [x] Review HEP    [] Progressed/Changed HEP based on:   [x] positioning   [x] body mechanics   [] transfers   [x] heat/ice application    [] other:      Other Objective/Functional Measures:    /90 taken manually prior to therapy  HEP provided today as pt is cleared for DVT           Pain Level (0-10 scale) post treatment: 0/10    ASSESSMENT/Changes in Function:   Initiated PT POC today per flow sheet, requiring vc and demo 100% of the time for proper form and technique with TE. Patient unable to perform UT/LS stretches without lateral trunk flexion compensation, performed with MT. Improved elbow extension with performing grippers in standing.      Patient will continue to benefit from skilled PT services to modify and progress therapeutic interventions, address ROM deficits, address strength deficits, analyze and address soft tissue restrictions, analyze and cue movement patterns, analyze and modify body mechanics/ergonomics, assess and modify postural abnormalities and instruct in home and community integration to attain remaining goals. [x]  See Plan of Care  []  See progress note/recertification  []  See Discharge Summary         Progress towards goals / Updated goals:  Short Term Goals: To be accomplished in 1 weeks:  1. Pt will be compliant with initial HEP in order to optimize therapy outcomes. Long Term Goals: To be accomplished in 4 weeks:  1. Pt will improve FOTO by 29 points in order to demonstrate functional improvement. 2.  Pt will report 50% improvement in symptoms in order to demonstrate improvement in QOL. 3.  Pt will improve PROM flexion to 120* in order to progress towards reaching overhead with ADL's.    4.  Pt will demonstrate hand girth measurement within 1cm difference when compared B at MCP's in order to increase functional use of right hand. 5.  Pt will improve right elbow extension AROM to 20* in order to improve ability with future reaching. 6.  Pt will report moderate difficulty with lower extremity dressing (once cleared for use of right UE with ADL's) in order to maximize independence.       PLAN  [x]  Upgrade activities as tolerated     [x]  Continue plan of care  []  Update interventions per flow sheet       []  Discharge due to:_  []  Other:_      JACK Tello 1/18/2022  4:45 PM

## 2022-01-20 ENCOUNTER — HOSPITAL ENCOUNTER (OUTPATIENT)
Dept: PHYSICAL THERAPY | Age: 87
Discharge: HOME | End: 2022-01-20
Payer: MEDICARE

## 2022-01-20 ENCOUNTER — HOSPITAL ENCOUNTER (EMERGENCY)
Age: 87
Discharge: HOME | End: 2022-01-21
Attending: STUDENT IN AN ORGANIZED HEALTH CARE EDUCATION/TRAINING PROGRAM
Payer: MEDICARE
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
## 2022-01-20 DIAGNOSIS — M25.511 CHRONIC RIGHT SHOULDER PAIN: ICD-10-CM

## 2022-01-20 DIAGNOSIS — M79.601 RIGHT ARM PAIN: ICD-10-CM

## 2022-01-20 DIAGNOSIS — S51.011A SKIN TEAR OF RIGHT ELBOW WITHOUT COMPLICATION, INITIAL ENCOUNTER: ICD-10-CM

## 2022-01-20 DIAGNOSIS — G89.29 CHRONIC RIGHT SHOULDER PAIN: ICD-10-CM

## 2022-01-20 PROCEDURE — 97110 THERAPEUTIC EXERCISES: CPT

## 2022-01-20 PROCEDURE — 99284 EMERGENCY DEPT VISIT MOD MDM: CPT

## 2022-01-20 NOTE — PROGRESS NOTES
PT DAILY TREATMENT NOTE     Patient Name: Traci Mix  Date:2022  : 10/8/1930  [x]  Patient  Verified  Payor: Roger Mensah / Plan: [facility] HUMANA MEDICARE CHOICE PPO/PFFS / Product Type: Managed Care Medicare /    In time: 12:49  Out time:1:29  Total Treatment Time (min): 40  Visit #: 3 of 12    Medicare/BCBS Only   Total Timed Codes (min):  40 1:1 Treatment Time:  40       Treatment Area: Right shoulder pain [M25.511]    SUBJECTIVE  Pain Level (0-10 scale): 0/10  Any medication changes, allergies to medications, adverse drug reactions, diagnosis change, or new procedure performed?: [x] No    [] Yes (see summary sheet for update)  Subjective functional status/changes:   [] No changes reported  Pt reports that he hasn't been having pain lately, just stiffness. The swelling in his hand is slowly getting better. He had an ultrasound after initial evaluation, and it was negative for DVT. Pt reports he usually holds his hand at his chest to try to help with the swelling. OBJECTIVE    40 min Therapeutic Exercise:  [x] See flow sheet : Shoulder PROM flexion, Elbow PROM extension, interventions per flow sheet    Rationale: increase ROM and increase strength to improve the patients ability to improve ease of ADL's            With   [] TE   [] TA   [] neuro   [] other: Patient Education: [x] Review HEP    [] Progressed/Changed HEP based on:   [] positioning   [] body mechanics   [] transfers   [] heat/ice application    [] other:      Other Objective/Functional Measures:     All PROM unforced  Firm end-feel with PROM shoulder flexion and elbow extension     PROM flexion right shoulder: 68*    PROM extension right elbow: 70* from neutral    AAROM extension right elbow: 75* from neutral    Instructed pt to stop holding hand at chest as this is likely contributing to elbow flexion contracture  Reviewed allowing arm to hang at side and swing normally with gait    Reviewed propping arm on pillow with hand elevated when performing stress ball squeeze to reduce hand swelling     Pain Level (0-10 scale) post treatment: 0/10    ASSESSMENT/Changes in Function:     Pt is making slow progress towards initial goals in therapy. He continues to present with limited shoulder PROM flexion and elbow flexion contracture. Pt's posture of holding right arm against chest is likely contributing, and pt was educated regarding avoiding this today. Will continue to address strength, ROM, flexibility, and swelling deficits in order to increase functional use of right UE and allow for return to PLOF. Patient will continue to benefit from skilled PT services to modify and progress therapeutic interventions, address ROM deficits, address strength deficits, analyze and address soft tissue restrictions, analyze and cue movement patterns, analyze and modify body mechanics/ergonomics, assess and modify postural abnormalities and instruct in home and community integration to attain remaining goals. Progress towards goals / Updated goals:  Short Term Goals: To be accomplished in 1 weeks:  1.  Pt will be compliant with initial HEP in order to optimize therapy outcomes. Goal met. 1/21/22  Long Term Goals: To be accomplished in 4 weeks:  1.  Pt will improve FOTO by 29 points in order to demonstrate functional improvement. 2.  Pt will report 50% improvement in symptoms in order to demonstrate improvement in QOL. 3.  Pt will improve PROM flexion to 120* in order to progress towards reaching overhead with ADL's.  Not met. PROM flexion 68* (71* at eval) 1/20/22  4.  Pt will demonstrate hand girth measurement within 1cm difference when compared B at MCP's in order to increase functional use of right hand. 5.  Pt will improve right elbow extension AROM to 20* in order to improve ability with future reaching. Not met.   Elbow extension PROM 70* from neutral (72* from neutral at eval) 1/20/22  6.  Pt will report moderate difficulty with lower extremity dressing (once cleared for use of right UE with ADL's) in order to maximize independence.      PLAN  [x]  Upgrade activities as tolerated     [x]  Continue plan of care  []  Update interventions per flow sheet       []  Discharge due to:_  []  Other:_      Janae Soto, PT 1/20/2022  1:05 PM    Future Appointments   Date Time Provider Davina King   1/25/2022  3:00 PM Asa Radha, PT [facility]   1/27/2022 10:30 AM Ranny Jeanne [facility]   2/1/2022  1:30 PM Ranny Jeanne [facility]   2/3/2022  1:30 PM Asa Radha, PT [facility]   2/8/2022 12:45 PM Ranny Jeanne [facility]   2/10/2022 12:00 PM Ranny Jeanne [facility]   2/15/2022 12:45 PM Ranny Jeanne [facility]   2/17/2022  1:30 PM Asa Radha, PT [facility]   2/22/2022 12:45 PM Ranny Jeanne KL[facility]   2/24/2022 12:45 PM Ranny Jeanne [facility]

## 2022-01-21 ENCOUNTER — APPOINTMENT (OUTPATIENT)
Dept: GENERAL RADIOLOGY | Age: 87
End: 2022-01-21
Attending: STUDENT IN AN ORGANIZED HEALTH CARE EDUCATION/TRAINING PROGRAM
Payer: MEDICARE

## 2022-01-21 ENCOUNTER — APPOINTMENT (OUTPATIENT)
Dept: CT IMAGING | Age: 87
End: 2022-01-21
Attending: STUDENT IN AN ORGANIZED HEALTH CARE EDUCATION/TRAINING PROGRAM
Payer: MEDICARE

## 2022-01-21 VITALS
HEART RATE: 82 BPM | DIASTOLIC BLOOD PRESSURE: 68 MMHG | SYSTOLIC BLOOD PRESSURE: 155 MMHG | RESPIRATION RATE: 17 BRPM | OXYGEN SATURATION: 97 %

## 2022-01-21 PROCEDURE — 70450 CT HEAD/BRAIN W/O DYE: CPT

## 2022-01-21 PROCEDURE — 73030 X-RAY EXAM OF SHOULDER: CPT

## 2022-01-21 PROCEDURE — 72125 CT NECK SPINE W/O DYE: CPT

## 2022-01-21 PROCEDURE — 73070 X-RAY EXAM OF ELBOW: CPT

## 2022-01-21 NOTE — ED TRIAGE NOTES
Pt to ED via EMS with ground level fall and right shoulder pain, pt is post op right shoulder replacement on Dec 14, 2021.

## 2022-01-21 NOTE — ED NOTES
I have reviewed discharge instructions with the patient and caregiver. The patient and caregiver verbalized understanding. Pt discharged from ED via w/c, stable in no distress.

## 2022-01-21 NOTE — DISCHARGE INSTRUCTIONS
You were evaluated in our emergency department following a fall in your home. At the time of our evaluation you sustained a skin tear to your right elbow which we cleansed and repaired with Steri-Strips. You can allow for these strips to fall off on their own, there is no need to remove them yourself. Please monitor the site for evidence of drainage, redness, tenderness, which could all be signs of a infection. If you are concerned, please return to the emergency department for repeat evaluation of this wound. Your preliminary scans that we took today were negative to our interpretation. If there are any modifications to these reads, we will notify you at the provided number in your chart. It is important for you to follow-up with your primary care doctor within 1 week to discuss your visit today. If you develop additional falls, headache, dizziness, weakness, chest pain, or any new or concerning symptoms, please return immediately to the emergency department. Thank you for allowing us to care for you today.

## 2022-01-21 NOTE — ED PROVIDER NOTES
EMERGENCY DEPARTMENT HISTORY AND PHYSICAL EXAM    12:43 AM    Date: 1/20/2022  Patient Name: Hector Ro    History of Presenting Illness     Chief Complaint   Patient presents with   [facility] Shoulder Pain     History Provided By: Patient and Patient's Daughter  Hector Ro is a 80year-old male with past medical history of HTN with recent right reverse total shoulder arthroplasty (12/14/2021) presenting via EMS s/p fall at home on carpeted floor. Pt reports that he had caught his foot in the carpet when he had fallen forward into a side table and hit his right shoulder and head. Pt denies loss of consciousness and called his daughter for assistance back up. Of note, patient is still recovering on the right side from his recent surgery. Patient reports that he has had edema present to his right hand that has been present since surgery and denies increase in swelling since the fall. Patient denies head pain, chest pain, shortness of breath, abdominal pain. Pt is not on anticoagulation therapy. PCP: Maryjane Duran, DO    Current Outpatient Medications   Medication Sig Dispense Refill    methylPREDNISolone (MEDROL, SILVIA,) 4 mg tablet Take as directed 1 Dose Pack 0    naproxen sodium (ALEVE) 220 mg cap Take 1 Cap by mouth two (2) times a day. Prn pain 30 Cap 0    diclofenac (VOLTAREN) 1 % gel Apply  to affected area four (4) times daily. 100 g 4    budesonide-formoterol (SYMBICORT) 80-4.5 mcg/actuation HFAA INHALE 2 PUFFS INTO MOUTH ONCE DAILY      gabapentin (NEURONTIN) 100 mg capsule Take 1 tab PO BID PRN for nerve pain 45 Cap 0    albuterol (PROAIR HFA) 90 mcg/actuation inhaler Take 2 Puffs by inhalation daily.  Cetirizine (ZYRTEC) 10 mg cap Take  by mouth.  lisinopril-hydrochlorothiazide (PRINZIDE, ZESTORETIC) 10-12.5 mg per tablet Take 1 Tab by mouth daily.          Past History     Past Medical History:  Past Medical History:   Diagnosis Date    Closed compression fracture of L3 lumbar vertebra 2018    Chronic aysmptomatic, noted on MRI 2018    Hypertension        Past Surgical History:  Past Surgical History:   Procedure Laterality Date    HX CATARACT REMOVAL Left 2004    HX CATARACT REMOVAL Right 2004    HX HERNIA REPAIR  2001    HX PROSTATECTOMY      HX RETINAL DETACHMENT REPAIR  10/2003       Family History:  History reviewed. No pertinent family history. Social History:  Social History     Tobacco Use    Smoking status: Former Smoker     Quit date: 1978     Years since quittin.0    Smokeless tobacco: Never Used   Substance Use Topics    Alcohol use: Yes     Alcohol/week: 1.7 standard drinks     Types: 2 Standard drinks or equivalent per week    Drug use: No       Allergies:  No Known Allergies    Review of Systems     Review of Systems   Constitutional: Negative for chills, diaphoresis, fatigue and fever. HENT: Negative for congestion, ear pain, facial swelling, nosebleeds, trouble swallowing and voice change. Eyes: Negative for pain and redness. Respiratory: Negative for cough, choking, chest tightness, shortness of breath, wheezing and stridor. Cardiovascular: Negative for chest pain, palpitations and leg swelling. Gastrointestinal: Negative for abdominal pain, constipation, diarrhea, nausea and vomiting. Genitourinary: Positive for difficulty urinating. Negative for flank pain and hematuria. Musculoskeletal: Positive for arthralgias (Right shoulder). Negative for back pain, neck pain and neck stiffness. Skin: Positive for wound. Neurological: Negative for dizziness, seizures, syncope, speech difficulty, light-headedness, numbness and headaches. Hematological: Bruises/bleeds easily. Psychiatric/Behavioral: Negative for agitation and confusion.      Physical Exam     Visit Vitals  BP (!) 155/68 (BP 1 Location: Left upper arm, BP Patient Position: At rest)   Pulse 82   Resp 17   SpO2 97%     Physical Exam  Vitals and nursing note reviewed. Constitutional:       General: He is not in acute distress. Appearance: Normal appearance. He is normal weight. He is not ill-appearing, toxic-appearing or diaphoretic. HENT:      Head: Normocephalic. Right Ear: External ear normal.      Left Ear: External ear normal.      Nose: Nose normal. No congestion or rhinorrhea. Mouth/Throat:      Mouth: Mucous membranes are moist.      Pharynx: Oropharynx is clear. No oropharyngeal exudate or posterior oropharyngeal erythema. Eyes:      General: No scleral icterus. Right eye: No discharge. Left eye: No discharge. Extraocular Movements: Extraocular movements intact. Conjunctiva/sclera: Conjunctivae normal.      Pupils: Pupils are equal, round, and reactive to light. Cardiovascular:      Rate and Rhythm: Normal rate and regular rhythm. Pulses: Normal pulses. Heart sounds: Normal heart sounds. No murmur heard. No friction rub. No gallop. Pulmonary:      Effort: Pulmonary effort is normal. No respiratory distress. Breath sounds: Normal breath sounds. No stridor. No wheezing, rhonchi or rales. Chest:      Chest wall: No tenderness. Abdominal:      General: Abdomen is flat. There is no distension. Palpations: There is no mass. Tenderness: There is no abdominal tenderness. There is no guarding or rebound. Musculoskeletal:         General: Swelling (right hand) and tenderness (right elbow, right shoulder) present. Cervical back: Normal range of motion and neck supple. No rigidity or tenderness. Right lower leg: Edema present. Left lower leg: Edema present. Comments: Reduced ROM to right upper extremity secondary to recent surgery and pain; compression stockings in place to bilateral lower extremities   Skin:     General: Skin is warm and dry. Capillary Refill: Capillary refill takes less than 2 seconds. Findings: Bruising present.    Neurological: General: No focal deficit present. Mental Status: He is alert and oriented to person, place, and time. Cranial Nerves: No cranial nerve deficit. Sensory: No sensory deficit. Psychiatric:         Mood and Affect: Mood normal.         Behavior: Behavior normal.         Thought Content: Thought content normal.         Judgment: Judgment normal.       Diagnostic Study Results     Labs -  No results found for this or any previous visit (from the past 12 hour(s)). Radiologic Studies -   CT HEAD WO CONT   Final Result   1. No evidence of acute intracranial abnormality. 2. Mild burden of presumed chronic small vessel ischemic changes, and mild   generalized volume loss. CT SPINE CERV WO CONT   Final Result   1. No CT signs of acute cervical spine trauma. 2. Moderate multilevel DDD and advanced multilevel facet DJD. 3. Incidental focal lucency in the C2 vertebral body, nonspecific, with broad   differential considerations including benign etiologies or metastases/myeloma. Consider nonemergent follow-up bone scan for further evaluation. XR SHOULDER RT AP/LAT MIN 2 V    (Results Pending)   XR ELBOW RT AP/LAT    (Results Pending)       Medical Decision Making   I am the first provider for this patient. I reviewed the vital signs, available nursing notes, past medical history, past surgical history, family history and social history. Vital Signs-Reviewed the patient's vital signs. Records Reviewed: Nursing Notes, Old Medical Records and Ambulance Run Sheet (Time of Review: 12:43 AM)    ED Course: Progress Notes, Reevaluation, and Consults:       Provider Notes (Medical Decision Making):   MDM   80year-old male presenting s/p fall with avulsion to right elbow, pain in right upper extremity, and head impact. No LOC, recalls entire event. No anticoagulation therapy. Awake, alert, in no acute distress. Initial vitals show hypertension, otherwise within normal limits. Lungs clear to auscultation, regular rate and rhythm, skin avulsion to elbow with controlled bleeding, nonfocal neurological exam, no midline vertebral tenderness to palpation of cervical spine. Skin avulsion was thoroughly cleansed and approximated using Steri-Strips. CT Head, cervical spine, and xrays negative upon ED interpretation. Upon repeat assessment, patient is well-appearing in no acute distress. Pt is able to ambulate without difficulty. Due to delay in final impression of scans, and request for Pt to go home, will discharge home with close return precautions. Patient is safe for discharged home at this time with PCP follow-up. All questions were answered for patient and patient's family member. Both verbalized understanding and agreement with plan. Wound Closure by Adhesive    Date/Time: 1/21/2022 3:09 AM  Performed by: Deann Sky DO  Authorized by: Vasu Crawford DO     Consent:     Consent obtained:  Verbal    Consent given by:  Patient    Risks discussed:  Infection, poor cosmetic result and poor wound healing  Anesthesia (see MAR for exact dosages): Anesthesia method:  None  Laceration details:     Location:  Shoulder/arm    Shoulder/arm location:  R elbow  Exploration:     Contaminated: no    Treatment:     Area cleansed with:  Immanuel    Amount of cleaning:  Standard  Skin repair:     Repair method:  Steri-Strips  Post-procedure details:     Dressing:  Non-adherent dressing and tube gauze    Patient tolerance of procedure: Tolerated well, no immediate complications  Comments:      Skin avulsion to right elbow, thoroughly cleansed, approximated with Steri-strips       Diagnosis     Clinical Impression:   1. Fall, initial encounter    2. Skin tear of right elbow without complication, initial encounter    3. Right arm pain    4.  Chronic right shoulder pain      Disposition: Home    Follow-up Information     Follow up With Specialties Details Why Contact Info Herbert Iglesias, DO Family Medicine Schedule an appointment as soon as possible for a visit in 1 week To follow up this visit [address] [facility] [address] [address] [phone]      [facility] EMERGENCY DEPT Emergency Medicine Go to  As needed, If symptoms worsen [address]  [phone]           Discharge Medication List as of 1/21/2022  3:24 AM      CONTINUE these medications which have NOT CHANGED    Details   methylPREDNISolone (MEDROL, SILVIA,) 4 mg tablet Take as directed, Print, Disp-1 Dose Pack, R-0      naproxen sodium (ALEVE) 220 mg cap Take 1 Cap by mouth two (2) times a day. Prn pain, Print, Disp-30 Cap, R-0      diclofenac (VOLTAREN) 1 % gel Apply  to affected area four (4) times daily. , Print, Disp-100 g, R-4      budesonide-formoterol (SYMBICORT) 80-4.5 mcg/actuation HFAA INHALE 2 PUFFS INTO MOUTH ONCE DAILY, Historical Med      gabapentin (NEURONTIN) 100 mg capsule Take 1 tab PO BID PRN for nerve pain, Print, Disp-45 Cap, R-0      albuterol (PROAIR HFA) 90 mcg/actuation inhaler Take 2 Puffs by inhalation daily. , Historical Med      Cetirizine (ZYRTEC) 10 mg cap Take  by mouth., Historical Med      lisinopril-hydrochlorothiazide (PRINZIDE, ZESTORETIC) 10-12.5 mg per tablet Take 1 Tab by mouth daily. , Historical Med           Disclaimer: Sections of this note are dictated using utilizing voice recognition software. Minor typographical errors may be present. If questions arise, please do not hesitate to contact me or call our department.

## 2022-01-25 ENCOUNTER — HOSPITAL ENCOUNTER (OUTPATIENT)
Dept: PHYSICAL THERAPY | Age: 87
Discharge: HOME | End: 2022-01-25
Payer: MEDICARE

## 2022-01-25 PROCEDURE — 97110 THERAPEUTIC EXERCISES: CPT

## 2022-01-25 PROCEDURE — 97140 MANUAL THERAPY 1/> REGIONS: CPT

## 2022-01-25 NOTE — PROGRESS NOTES
PT DAILY TREATMENT NOTE     Patient Name: Traci Mix  Date:2022  : 10/8/1930  [x]  Patient  Verified  Payor: Roger Mensah / Plan: [facility] HUMANA MEDICARE CHOICE PPO/PFFS / Product Type: Managed Care Medicare /    In time:3:04  Out time:3:44  Total Treatment Time (min): 40  Visit #: 4 of 12    Medicare/BCBS Only   Total Timed Codes (min):  40 1:1 Treatment Time:  40       Treatment Area: Right shoulder pain [M25.511]    SUBJECTIVE  Pain Level (0-10 scale): 0/10 at rest, 10/10 with movement   Any medication changes, allergies to medications, adverse drug reactions, diagnosis change, or new procedure performed?: [x] No    [] Yes (see summary sheet for update)  Subjective functional status/changes:   [] No changes reported  Pt reports that he fell last Thursday evening. He was adjusting his thermostat and his feet got tangled and he fell on his right side onto a table and hit his head. He went to the emergency room and had imaging of his head and shoulder, and everything was normal.  He has been having increased right shoulder and elbow pain since. He has a wound on his elbow from the fall. OBJECTIVE      23 min Therapeutic Exercise:  [x] See flow sheet :   Rationale: increase ROM and increase strength to improve the patients ability to improve ability with ADL's       17 min Manual Therapy:  Gentle shoulder clocks, PROM shoulder flexion with oscillations, PROM elbow extension with oscillations   The manual therapy interventions were performed at a separate and distinct time from the therapeutic activities interventions.   Rationale: decrease pain, increase ROM and increase tissue extensibility to improve ability with ADL's             With   [] TE   [] TA   [] neuro   [] other: Patient Education: [x] Review HEP    [] Progressed/Changed HEP based on:   [] positioning   [] body mechanics   [] transfers   [] heat/ice application    [] other:      Other Objective/Functional Measures: Shoulder Flexion PROM: 32*  Elbow Extension PROM: 68* from neutral     Significant guarding with PROM initially  Reduced guarding with gentle oscillations and and verbal cues to relax  All PROM performed unforced     Steri strips intact on elbow wound   Reduced pain following session  Pt declined ice    Continued swelling and pitting edema in right UE     Pain Level (0-10 scale) post treatment: 0/10 rest, 5/10 with movement     ASSESSMENT/Changes in Function:     Pt reports increased pain levels since fall 1/20/22. He remains highly motivated in therapy but demonstrated reduced PROM and decreased tolerance with therapy interventions this visit. Therapist called MD's office and left message notifying them of fall, increased pain levels, and reduced PROM. Will continue to address strength, ROM, and flexibility deficits in order to increase functional use of right UE. Patient will continue to benefit from skilled PT services to modify and progress therapeutic interventions, address ROM deficits, address strength deficits, analyze and address soft tissue restrictions, analyze and cue movement patterns, analyze and modify body mechanics/ergonomics, assess and modify postural abnormalities and instruct in home and community integration to attain remaining goals. Progress towards goals / Updated goals:  Short Term Goals: To be accomplished in 1 weeks:  1.  Pt will be compliant with initial HEP in order to optimize therapy outcomes. Goal met. 1/21/22  Long Term Goals: To be accomplished in 4 weeks:  1.  Pt will improve FOTO by 29 points in order to demonstrate functional improvement. 2.  Pt will report 50% improvement in symptoms in order to demonstrate improvement in QOL. 3.  Pt will improve PROM flexion to 120* in order to progress towards reaching overhead with ADL's.  Not met. PROM flexion 68* (71* at eval) 1/20/22 Not met.   PROM flexion 32* 1/25/22  4.  Pt will demonstrate hand girth measurement within 1cm difference when compared B at MCP's in order to increase functional use of right hand. 5.  Pt will improve right elbow extension AROM to 20* in order to improve ability with future reaching. Not met.   Elbow extension PROM 70* from neutral (72* from neutral at eval) 1/20/22  6.  Pt will report moderate difficulty with lower extremity dressing (once cleared for use of right UE with ADL's) in order to maximize independence.      PLAN  []  Upgrade activities as tolerated     []  Continue plan of care  []  Update interventions per flow sheet       []  Discharge due to:_  []  Other:_      Anais Jauregui, PT 1/25/2022  3:29 PM    Future Appointments   Date Time Provider Davina King   1/27/2022 10:30 AM Boelus Negus [facility]   2/1/2022  1:30 PM Boelus Negus [facility]   2/3/2022  1:30 PM Chioma Batista, PT [facility]   2/8/2022 12:45 PM Boelus Negus [facility]   2/10/2022 12:00 PM Boelus Negus [facility]   2/15/2022 12:45 PM Grace Negus [facility]   2/17/2022  1:30 PM Chioma Batista PT [id number]   2/22/2022 12:45 PM Boelus Negus [id number] [facility]   2/24/2022 12:45 PM Boelus Negus [facility]

## 2022-01-26 ENCOUNTER — TELEPHONE (OUTPATIENT)
Dept: PHYSICAL THERAPY | Age: 87
End: 2022-01-26

## 2022-01-27 ENCOUNTER — HOSPITAL ENCOUNTER (OUTPATIENT)
Dept: PHYSICAL THERAPY | Age: 87
Discharge: HOME | End: 2022-01-27
Payer: MEDICARE

## 2022-01-27 PROCEDURE — 97110 THERAPEUTIC EXERCISES: CPT

## 2022-01-27 PROCEDURE — 97140 MANUAL THERAPY 1/> REGIONS: CPT

## 2022-01-27 NOTE — PROGRESS NOTES
PT DAILY TREATMENT NOTE     Patient Name: Nubia Flores  Date:2022  : 10/8/1930  [x]  Patient  Verified  Payor: Addy Chavez / Plan: [facility] HU[employer] MEDICARE CHOICE PPO/PFFS / Product Type: Managed Care Medicare /    In time:10:28  Out time: 11:18  Total Treatment Time (min): 50  Visit #: 5 of 12    Medicare/BCBS Only   Total Timed Codes (min):  50 1:1 Treatment Time:  50       Treatment Area: Right shoulder pain [M25.511]    SUBJECTIVE  Pain Level (0-10 scale): 0/10 at rest, 10/10 with movement   Any medication changes, allergies to medications, adverse drug reactions, diagnosis change, or new procedure performed?: [x] No    [] Yes (see summary sheet for update)  Subjective functional status/changes:   [] No changes reported  Patient reports he has an appt tomorrow for a f/u with [facility]. The therapist last time told him she would call them and notify them of his fall. He is not doing well, his pain is 10/10 with movement but 0/10 at rest. He reports this morning his arm was freeing up a bit without pain. OBJECTIVE      17 min Therapeutic Exercise:  [x] See flow sheet :   Rationale: increase ROM and increase strength to improve the patients ability to improve ability with ADL's       33 min Manual Therapy:  STM/TPR to Right UT/LS and supraspinatus; STM/TPR to Right bicep/tricep and forearm; Right UE edema massage; PROM shoulder flexion with oscillations, PROM elbow extension with oscillations; PROM wrist flex/ext/UD/RD/Pron/Sup; manual elbow S 2 x 30 seconds, manual digit S 2 x 30 seconds    The manual therapy interventions were performed at a separate and distinct time from the therapeutic activities interventions.   Rationale: decrease pain, increase ROM and increase tissue extensibility to improve ability with ADL's             With   [] TE   [] TA   [] neuro   [] other: Patient Education: [x] Review HEP    [] Progressed/Changed HEP based on:   [] positioning   [] body mechanics [] transfers   [] heat/ice application    [] other:      Other Objective/Functional Measures:   Elbow Extension PROM: 68* from neutral     VCs for ms guarding with MT  All PROM performed unforced     Steri strips intact on elbow wound   Pt declined ice    Continued swelling and pitting edema in right UE, edema massage performed     Pain Level (0-10 scale) post treatment: 2/10    ASSESSMENT/Changes in Function:   No change in patient's reduced ROM and increased pain levels today. MT performed unforced and to patient tolerance with significant trigger points in Right UT/LS and supraspinatus. Patient has follow up appt with surgeon tomorrow. Will progress as able. Patient will continue to benefit from skilled PT services to modify and progress therapeutic interventions, address ROM deficits, address strength deficits, analyze and address soft tissue restrictions, analyze and cue movement patterns, analyze and modify body mechanics/ergonomics, assess and modify postural abnormalities and instruct in home and community integration to attain remaining goals. Progress towards goals / Updated goals:  Short Term Goals: To be accomplished in 1 weeks:  1.  Pt will be compliant with initial HEP in order to optimize therapy outcomes. Goal met. 1/21/22  Long Term Goals: To be accomplished in 4 weeks:  1.  Pt will improve FOTO by 29 points in order to demonstrate functional improvement. 2.  Pt will report 50% improvement in symptoms in order to demonstrate improvement in QOL. 3.  Pt will improve PROM flexion to 120* in order to progress towards reaching overhead with ADL's.  Not met. PROM flexion 68* (71* at eval) 1/20/22 Not met. PROM flexion 32* 1/25/22  4.  Pt will demonstrate hand girth measurement within 1cm difference when compared B at MCP's in order to increase functional use of right hand. 5.  Pt will improve right elbow extension AROM to 20* in order to improve ability with future reaching. Not met.   Elbow extension PROM 70* from neutral (72* from neutral at eval) 1/20/22  6.  Pt will report moderate difficulty with lower extremity dressing (once cleared for use of right UE with ADL's) in order to maximize independence.      PLAN  []  Upgrade activities as tolerated     [x]  Continue plan of care  []  Update interventions per flow sheet       []  Discharge due to:_  []  Other:_      JACK Blum 1/27/2022  11:18 AM    Future Appointments   Date Time Provider Davina King   1/27/2022 10:30 AM Estevan Lobe [facility]   2/1/2022  1:30 PM Estevan Lobe [facility]   2/3/2022  1:30 PM Boneta Plane, PT [facility]   2/8/2022 12:45 PM Estevan Lobe [facility]   2/10/2022 12:00 PM Estevan Lobe [facility]   2/15/2022 12:45 PM Estevan Lobe [facility]   2/17/2022  1:30 PM Boneta Plane, PT [facility]OYZ[facility]   2/22/2022 12:45 PM Estevan Lobe [facility]CLZ[facility]   2/24/2022 12:45 PM Estevan Lobe [facility]

## 2022-02-01 ENCOUNTER — APPOINTMENT (OUTPATIENT)
Dept: PHYSICAL THERAPY | Age: 87
End: 2022-02-01

## 2022-02-03 ENCOUNTER — APPOINTMENT (OUTPATIENT)
Dept: PHYSICAL THERAPY | Age: 87
End: 2022-02-03

## 2022-02-08 ENCOUNTER — APPOINTMENT (OUTPATIENT)
Dept: PHYSICAL THERAPY | Age: 87
End: 2022-02-08

## 2022-02-10 ENCOUNTER — APPOINTMENT (OUTPATIENT)
Dept: PHYSICAL THERAPY | Age: 87
End: 2022-02-10

## 2022-02-15 ENCOUNTER — APPOINTMENT (OUTPATIENT)
Dept: PHYSICAL THERAPY | Age: 87
End: 2022-02-15

## 2022-02-17 ENCOUNTER — APPOINTMENT (OUTPATIENT)
Dept: PHYSICAL THERAPY | Age: 87
End: 2022-02-17

## 2022-02-22 ENCOUNTER — APPOINTMENT (OUTPATIENT)
Dept: PHYSICAL THERAPY | Age: 87
End: 2022-02-22

## 2022-02-24 ENCOUNTER — APPOINTMENT (OUTPATIENT)
Dept: PHYSICAL THERAPY | Age: 87
End: 2022-02-24

## 2022-03-28 ENCOUNTER — HOSPITAL ENCOUNTER (OUTPATIENT)
Dept: PHYSICAL THERAPY | Age: 87
Discharge: HOME | End: 2022-03-28
Payer: MEDICARE

## 2022-03-28 ENCOUNTER — APPOINTMENT (OUTPATIENT)
Dept: PHYSICAL THERAPY | Age: 87
End: 2022-03-28

## 2022-03-28 PROCEDURE — 97162 PT EVAL MOD COMPLEX 30 MIN: CPT

## 2022-03-28 NOTE — PROGRESS NOTES
PT DAILY TREATMENT NOTE/SHOULDER EVAL     Patient Name: Josie Bills  Date:3/28/2022  : 10/8/1930  [x]  Patient  Verified  Payor: Rafal Abraham / [facility] HU[employer]NA [facility]IC[facility] CHOICE PPO/PFFS / Product Type: Managed Care Medicare /    In time:2:14P  Out time:2:53P  Total Treatment Time (min): 39  Visit #: 1 of 12    Medicare/BCBS Only   Total Timed Codes (min):  14 1:1 Treatment Time: 39       Treatment Area: Right shoulder pain [M25.511]    SUBJECTIVE  Pain Level (0-10 scale): 0/10  []constant []intermittent []improving []worsening []no change since onset    Any medication changes, allergies to medications, adverse drug reactions, diagnosis change, or new procedure performed?: [x] No    [] Yes (see summary sheet for update)  Subjective functional status/changes:     Comorbidities: HTN, visual impairment, hearing impairment, history of L3 compression fracture    Prior Level of Function: lives alone, has caregiver 4 hours/day 3 days/week, right-handed, Ind with ADL's       The Plan of Care and following information is based on the information from the initial evaluation. Assessment/ key information: Patient is a 57-year-old right-handed male who presents to therapy with chief complaint of right shoulder pain. Patient was being seen for skilled outpatient PT following right reverse TSA on 21 but had fallen on his right side. He was adjusting his thermostat and his feet got tangled and he fell on his right side onto a table and hit his head. He followed up with MD regarding fall and discovered he had a fracture to the right side where \"the metal went into the bone\". Imaging of head and right UE at ER following fall were unremarkable. He reports imaging at recent MD visit was \"good\". He is supposed to be wearing a sling for 4 additional weeks; he only takes it off to shower. He has not had any falls/LOB since fall on 22. He also denies dizziness.  Right UE swollen with erythema noted; he reports his hand has been swollen since surgery but is better. He has not had pain or radicular symptoms in the shoulder in the last few weeks. Patient presents to therapy with sling donned on right. Exam reveals right elbow AROM measured at 50-89 deg.  strength grossly diminished on right measured at 14# then 15# in two trials (compared with 38# and 44# on left). Patient with noticeable right UE tremors upon resting and worsened with active right UE motions. Sensation intact to light touch B. Patient mod to max A to don/doff sling. Patient would benefit from skilled outpatient PT to address above mentioned deficits to return to prior level of function, increase independence with ADLs, and improve overall quality of life. Additional testing held this visit due to physician script reading \"DISTAL ROM\". Follow up phone call initiated to physician's office prior to evaluation for additional clarification regarding current protocol.           25 min [x]Eval                  []Re-Eval       7 min Therapeutic Exercise:  [x] See flow sheet :   Rationale: increase ROM, increase strength and increase proprioception to improve the patients ability to perform ADLs with increased ease    7 min Therapeutic Activity:  [x]  See flow sheet :   Rationale: increase ROM, increase strength and increase proprioception  to improve the patients ability to perform ADLs with increased ease           With   [] TE   [x] TA   [] neuro   [] other: Patient Education: [x] Review HEP    [] Progressed/Changed HEP based on:   [] positioning   [] body mechanics   [] transfers   [] heat/ice application    [x] other: Ability to perform cervical exercises and gripping with sling donned; ensuring performance of passive elbow/ext when daughter/caregiver present to assist with donning/doffing sling      Other Objective/Functional Measures:     Physical Therapy Evaluation - Shoulder    Posture: [] Poor    [x] Fair    [] Good    Describe:    ROM:  [] Unable to assess at this time                                           AROM                                                              PROM   Left Right  Left Right   Flexion 124  Flexion     Extension   Extension     Scaption/ABD 91  Scaptin/ABD     ER @ 0 Degrees   ER @ 0 Degrees     ER @ 90 Degrees   ER @ 90 Degrees     IR @ 90 Degrees   IR @ 90 Degrees         End Feel / Painful Arc:     Strength:   [] Unable to assess at this time           Strength in available range:                                                                    L (1-5) R (1-5) Pain   Flexors 4+  [] Yes   [] No   Abductors 4  [] Yes   [] No   External Rotators 3  [] Yes   [] No   Internal Rotators 3+  [] Yes   [] No   Supraspinatus   [] Yes   [] No   Serratus Anterior   [] Yes   [] No   Lower Trapezius   [] Yes   [] No   Elbow Flexion 4  [] Yes   [] No   Elbow Extension 4+  [] Yes   [] No         Other Tests / Comments:     Elbow AROM: 50-90 deg   strength (pounds): right 14, 15; left 38, 44    Noticeable shaking to right UE with active movements and at rest worsened with active movements      Sensation intact to light touch B      Pain Level (0-10 scale) post treatment: 0/10    ASSESSMENT/Changes in Function: See POC    Patient will continue to benefit from skilled PT services to modify and progress therapeutic interventions, address functional mobility deficits, address ROM deficits, address strength deficits, analyze and address soft tissue restrictions, analyze and cue movement patterns, analyze and modify body mechanics/ergonomics, assess and modify postural abnormalities, address imbalance/dizziness and instruct in home and community integration to attain remaining goals.      [x]  See Plan of Care  []  See progress note/recertification  []  See Discharge Summary         Progress towards goals / Updated goals:  See POC    PLAN  []  Upgrade activities as tolerated     [x]  Continue plan of care  []  Update interventions per flow sheet       []  Discharge due to:_  []  Other:_      Briseida Jim, PT 3/28/2022  9:01 AM

## 2022-03-28 NOTE — PROGRESS NOTES
[facility] [address]  [phone] fax    Physical Therapy Discharge Summary    Patient name: Tomer Neal Start of Care: 22   Referral source: Gene Isabel : 10/8/1930   Medical/Treatment Diagnosis: Right shoulder pain [M25.511]  Payor: HU[employer]NA MEDICARE / Plan: [facility] MEDICARE CHOICE PPO/PFFS / Product Type: Managed Care Medicare /  Onset Date:21     Prior Hospitalization: see medical history Provider#: [id number]   Medications: Verified on Patient Summary List    Comorbidities: HTN, visual impairment, hearing impairment, history of L3 compression fracture    Prior Level of Function: lives alone (currently staying with daughter), right-handed, Ind with ADL's     Visits from Start of Care: 5    Missed Visits: 0    Reporting Period : 22 to 22    Summary of Care:  Goal: Pt will be compliant with initial HEP in order to optimize therapy outcomes. Status at last note/certification: Initiated at eval  Status at discharge: met    Goal: Pt will improve FOTO by 29 points in order to demonstrate functional improvement. Status at last note/certification:31/100  Status at discharge: not met. Not assessed d/t unplanned DC    Goal: Pt will report 50% improvement in symptoms in order to demonstrate improvement in QOL. Status at last note/certification: 0% as baseline   Status at discharge: not met. Not assessed d/t unplanned DC    Goal: Pt will improve PROM flexion to 120* in order to progress towards reaching overhead with ADL's. Status at last note/certification: 71*  Status at discharge: not met. 32* following fall    Goal: Pt will demonstrate hand girth measurement within 1cm difference when compared B at MCP's in order to increase functional use of right hand. Status at last note/certification: 2cm girth difference   Status at discharge: not met.   Not assessed d/t unplanned DC    Goal: Pt will improve right elbow extension AROM to 20* in order to improve ability with future reaching  Status at last note/certification: 72* from neutral   Status at discharge: not met. Slowly progressing. 68* from neutral     Goal: Pt will report moderate difficulty with lower extremity dressing (once cleared for use of right UE with ADL's) in order to maximize independence.    Status at last note/certification: much difficulty   Status at discharge: not met. Not assessed d/t unplanned DC      ASSESSMENT/RECOMMENDATIONS:  Pt's daughter called and CX all remaining therapy appointments d/t a fall that resulted in a fracture. DC at this time d/t new injury.   Unable to assess goals d/t unplanned DC.        [x]Discontinue therapy: []Patient has reached or is progressing toward set goals      [x]Patient is non-compliant or has abdicated      [x]Self-DC d/t new injury     Radha Whitaker, PT 3/28/2022 10:39 AM

## 2022-03-28 NOTE — PROGRESS NOTES
[facility] - [facility]  [address]  [phone] fax    Plan of Care/ Statement of Necessity for Physical Therapy Services    Patient name: Laura Rivera Start of Care: 3/28/2022   Referral source: Serafin Baumgarten, [address] : 10/8/1930    Medical Diagnosis: Right shoulder pain [M25.511]  Payor: HU[employer]NA MEDICARE / Plan: [facility] MEDICARE CHOICE PPO/PFFS / Product Type: Managed Care Medicare /  Onset Date:22    Treatment Diagnosis: Right shoulder pain   Prior Hospitalization: see medical history Provider#: [id number]   Medications: Verified on Patient summary List    Comorbidities: HTN, visual impairment, hearing impairment, history of L3 compression fracture    Prior Level of Function: lives alone, has caregiver 4 hours/day 3 days/week, right-handed, Ind with ADL's       The Plan of Care and following information is based on the information from the initial evaluation. Assessment/ key information: Patient is a 59-year-old right-handed male who presents to therapy with chief complaint of right shoulder pain. Patient was being seen for skilled outpatient PT following right reverse TSA on 21 but had fallen on his right side. He was adjusting his thermostat and his feet got tangled and he fell on his right side onto a table and hit his head. He followed up with MD regarding fall and discovered he had a fracture to the right side where \"the metal went into the bone\". Imaging of head and right UE at ER following fall were unremarkable. He reports imaging at recent MD visit was \"good\". He is supposed to be wearing a sling for 4 additional weeks; he only takes it off to shower. He has not had any falls/LOB since fall on 22. He also denies dizziness. Right UE swollen with erythema noted; he reports his hand has been swollen since surgery but is better. He has not had pain or radicular symptoms in the shoulder in the last few weeks. Patient presents to therapy with sling donned on right. Exam reveals right elbow AROM measured at 50-89 deg.  strength grossly diminished on right measured at 14# then 15# in two trials (compared with 38# and 44# on left). Patient with noticeable right UE tremors upon resting and worsened with active right UE motions. Sensation intact to light touch B. Patient mod to max A to don/doff sling. Patient would benefit from skilled outpatient PT to address above mentioned deficits to return to prior level of function, increase independence with ADLs, and improve overall quality of life. Additional testing held this visit due to physician script reading \"DISTAL ROM\". Follow up phone call initiated to physician's office prior to evaluation for additional clarification regarding current protocol. Evaluation Complexity History HIGH Complexity :3+ comorbidities / personal factors will impact the outcome/ POC ; Examination HIGH Complexity : 4+ Standardized tests and measures addressing body structure, function, activity limitation and / or participation in recreation  ;Presentation MEDIUM Complexity : Evolving with changing characteristics  ; Clinical Decision Making MEDIUM Complexity : FOTO score of 26-74  Overall Complexity Rating: MEDIUM  Problem List: pain affecting function, decrease ROM, decrease strength, edema affecting function, impaired gait/ balance, decrease ADL/ functional abilitiies, decrease activity tolerance, decrease flexibility/ joint mobility and decrease transfer abilities   Treatment Plan may include any combination of the following: Therapeutic exercise, Therapeutic activities, Neuromuscular re-education, Physical agent/modality, Gait/balance training, Manual therapy, Patient education, Self Care training, Functional mobility training, Home safety training and Stair training  Patient / Family readiness to learn indicated by: asking questions, trying to perform skills and interest  Persons(s) to be included in education: patient (P) and family support person (FSP);list daughter  Barriers to Learning/Limitations: yes;  sensory deficits-vision/hearing/speech  Patient Goal (s): to get my shoulder like it used to be  Patient Self Reported Health Status: fair  Rehabilitation Potential: fair    Short Term Goals: To be accomplished in 3 treatments:   1. Patient will report compliance with initial HEP to optimize therapy outcomes. Long Term Goals: To be accomplished in 4 weeks:   1. Patient will improve FOTO score by at least 5 points in order to demonstrate functional improvement. 2. Patient will report no more than \"little difficulty\" with \"Taking off glasses or sunglasses \" with FOTO in order to demonstrate improved tolerance to ADLs. 3. Patient will be able to don/doff sling with mod I in order to demonstrate improved activity tolerance. 4. Patient will improve right elbow ext ROM to at least 45 deg from neutral in order to increase functional use of right UE. Status at eval: 50-89 deg   5. Patient will improve right  strength to at least 20# in order to perform ADLs with increased ease. Status at eval: 14#, 15#   6. Patient will improve right shoulder AAROM into flex and abd to at least 90 deg (once cleared to advance per protocol) in order to improve tolerance to reaching. Frequency / Duration: Patient to be seen 2-3 times per week for 4 weeks. Patient/ Caregiver education and instruction: Diagnosis, prognosis, self care, activity modification and exercises   [x]  Plan of care has been reviewed with PTA    Certification Period: 3/28/22-4/26/22  Shaye Taveras, PT 3/28/2022 9:02 AM    ________________________________________________________________________    I certify that the above Therapy Services are being furnished while the patient is under my care. I agree with the treatment plan and certify that this therapy is necessary.     [address] [facility] Signature:____________Date:_________TIME:________     JAY Ruiz  ** Signature, Date and Time must be completed for valid certification **    Please sign and return to [facility]  [address]  [phone] fax

## 2022-03-31 ENCOUNTER — HOSPITAL ENCOUNTER (OUTPATIENT)
Dept: PHYSICAL THERAPY | Age: 87
Discharge: HOME | End: 2022-03-31
Payer: MEDICARE

## 2022-03-31 PROCEDURE — 97110 THERAPEUTIC EXERCISES: CPT

## 2022-03-31 NOTE — PROGRESS NOTES
PT DAILY TREATMENT NOTE     Patient Name: Chidi Hitchccok  Date:3/31/2022  : 10/8/1930  [x]  Patient  Verified  Payor: Jamessheridan Plate / Plan: [facility] [employer] MEDICARE CHOICE PPO/PFFS / Product Type: Managed Care Medicare /    In time:9:02  Out time:9:40  Total Treatment Time (min): 38  Visit #: 2 of 12    Medicare/BCBS Only   Total Timed Codes (min):  38 1:1 Treatment Time:  38       Treatment Area: Right shoulder pain [M25.511]    SUBJECTIVE  Pain Level (0-10 scale): 0/10  Any medication changes, allergies to medications, adverse drug reactions, diagnosis change, or new procedure performed?: [x] No    [] Yes (see summary sheet for update)  Subjective functional status/changes:   [] No changes reported  Pt reports that he is unable to get the sling on and off by himself, so his daughter helps him. He is supposed to continue to wear his sling until he sees his MD again. Next MD follow up is 22, and he is scheduled for more xrays at that time. Radiograph results per ortho's note note 3/18/22:  RADIOGRAPHS: The patient received AP transthoracic views of the right shoulder. 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 There is no evidence of new fracture or dislocation. Fracture fragments are appropriately aligned. There is no evidence of fracture fragment migration or displacement. Radiographs were taken and reviewed today in the office.       OBJECTIVE    38 min Therapeutic Exercise:  [x] See flow sheet :   Rationale: increase ROM and increase strength to improve the patients ability to improve ease of ADL's            With   [] TE   [] TA   [] neuro   [] other: Patient Education: [x] Review HEP    [] Progressed/Changed HEP based on:   [] positioning   [] body mechanics   [] transfers   [] heat/ice application    [] other:      Other Objective/Functional Measures:     Limited cervical AROM all planes   Difficulty grasping gripper, able to perform gripper in small range once grasped     Minimal wrist extension AROM against gravity. Therapist assisted concentrically and cued pt to focus on eccentric lowering from wrist extension    Tremors with attempting gentle PROM of elbow into extension when sitting, ceased d/t tremors  Had pt stand and performed gentle PROM into elbow extension with gravity assisting. Increased ability to relax and able to perform without tremors. Elbow extension PROM: 87* from neutral   PROM performed unforced     No pain during or following session     Pain Level (0-10 scale) post treatment: 0/10    ASSESSMENT/Changes in Function:     Initiated treatment per POC. Pt was motivated in therapy and put forth good effort with interventions. 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 Will continue to address strength, ROM, and flexibility deficits in order to increase functional use of right UE. Pt is scheduled for MD follow up and xrays in ~2 weeks to assess healing of fracture. Patient will continue to benefit from skilled PT services to modify and progress therapeutic interventions, address functional mobility deficits, address ROM deficits, address strength deficits, analyze and address soft tissue restrictions, analyze and cue movement patterns, analyze and modify body mechanics/ergonomics, assess and modify postural abnormalities, address imbalance/dizziness and instruct in home and community integration to attain remaining goals. Progress towards goals / Updated goals:  Short Term Goals: To be accomplished in 3 treatments:              1. Patient will report compliance with initial HEP to optimize therapy outcomes. Goal met. 3/31/22  Long Term Goals:  To be accomplished in 4 weeks:              1. Patient will improve FOTO score by at least 5 points in order to demonstrate functional improvement. 2. Patient will report no more than \"little difficulty\" with \"Taking off glasses or sunglasses \" with FOTO in order to demonstrate improved tolerance to ADLs. 3. Patient will be able to don/doff sling with mod I in order to demonstrate improved activity tolerance. 4. Patient will improve right elbow ext ROM to at least 45 deg from neutral in order to increase functional use of right UE. Status at eval: 50-89 deg              5. Patient will improve right  strength to at least 20# in order to perform ADLs with increased ease. Status at eval: 14#, 15#              6. Patient will improve right shoulder AAROM into flex and abd to at least 90 deg (once cleared to advance per protocol) in order to improve tolerance to reaching.      PLAN  [x]  Upgrade activities as tolerated     [x]  Continue plan of care  []  Update interventions per flow sheet       []  Discharge due to:_  []  Other:_      Darien Jones, PT 3/31/2022  9:08 AM    No future appointments.

## 2022-04-06 ENCOUNTER — HOSPITAL ENCOUNTER (OUTPATIENT)
Dept: PHYSICAL THERAPY | Age: 87
Discharge: HOME | End: 2022-04-06
Payer: MEDICARE

## 2022-04-06 PROCEDURE — 97110 THERAPEUTIC EXERCISES: CPT

## 2022-04-06 NOTE — PROGRESS NOTES
PT DAILY TREATMENT NOTE     Patient Name: Katie Pass  Date:2022  : 10/8/1930  [x]  Patient  Verified  Payor: Nelida Mendiola / Plan: [facility] H[employer] MEDICARE CHOICE PPO/PFFS / Product Type: Managed Care Medicare /    In time:4:30P  Out time:5:13P  Total Treatment Time (min): 43  Visit #: 3 of 12    Medicare/BCBS Only   Total Timed Codes (min):  43 1:1 Treatment Time:  43       Treatment Area: Right shoulder pain [M25.511]    SUBJECTIVE  Pain Level (0-10 scale): 0/10  Any medication changes, allergies to medications, adverse drug reactions, diagnosis change, or new procedure performed?: [x] No    [] Yes (see summary sheet for update)  Subjective functional status/changes:   [] No changes reported    Patient reports he feels about the same as he did last time. He is wondering if he we are able to work on the shoulder.     OBJECTIVE    43 min Therapeutic Exercise:  [x] See flow sheet :   Rationale: increase ROM, increase strength and increase proprioception to improve the patients ability to perform functional tasks with increased ease              With   [] TE   [] TA   [] neuro   [] other: Patient Education: [x] Review HEP    [] Progressed/Changed HEP based on:   [] positioning   [] body mechanics   [] transfers   [] heat/ice application    [] other: educated on distal ROM only per protocol at this time but will again attempt to follow up with MD     Other Objective/Functional Measures:     Patient reported stretching to area of biceps/lateral upper following doffing sling  Max A for donning/doffing sling  Tactile feedback for form with cervical retractions in seated with min improvement following  Patient denied stretch with UT stretch even with hand on head for increased resistance-activity held  Hand on head for levator stretch  Mod range with gripping  Tactile cues for scap squeezes for activitation  Increased tremors with attempts to wrap hand around gripper reduced with cues to allow therapist to squeeze gripper and place in patient's hand and to control  (decreased speed and hold time) rather than emphasizing increased power/strength  Min tremors with active wrist flex/ext (more with flex)  Emphasis on ecc control with wrist ext against gravity  Elbow tenderness reported with resting elbow on armrest eliminated with placing pillow under elbow  Limited range with active supination  Min improvement with form with cervical retraction in semi reclined    Pain Level (0-10 scale) post treatment: 0/10    ASSESSMENT/Changes in Function: Patient denies overall improvement at this time. He continues to demonstrate decreased  strength and difficulty with active elbow motions and wrist ext and supination. Patient continues to require assistance with donning/doffing sling so spends most time with it on at home due to limited assistance with being able to take it off. He requires tactile feedback for form/muscle recruitment with strengthening for parascapular and deep neck flexors muscles. He reports continued compliance with HEP. Plan to attempt to follow up with referring MD regarding protocol and ability to progress with more proximal ROM activities. Patient will continue to benefit from skilled PT services to modify and progress therapeutic interventions, address functional mobility deficits, address ROM deficits, address strength deficits, analyze and address soft tissue restrictions, analyze and cue movement patterns, analyze and modify body mechanics/ergonomics, assess and modify postural abnormalities and instruct in home and community integration to attain remaining goals. Progress towards goals / Updated goals:  Short Term Goals: To be accomplished in 3 treatments:              1. Patient will report compliance with initial HEP to optimize therapy outcomes. Goal met. 3/31/22  Long Term Goals: To be accomplished in 4 weeks:              1.  Patient will improve FOTO score by at least 5 points in order to demonstrate functional improvement.               2. Patient will report no more than \"little difficulty\" with \"Taking off glasses or sunglasses \" with FOTO in order to demonstrate improved tolerance to ADLs.             3. Patient will be able to don/doff sling with mod I in order to demonstrate improved activity tolerance.              4. Patient will improve right elbow ext ROM to at least 45 deg from neutral in order to increase functional use of right UE.               Status at eval: 50-89 deg              5. Patient will improve right  strength to at least 20# in order to perform ADLs with increased ease.              Status at eval: 14#, 15#              6. Patient will improve right shoulder AAROM into flex and abd to at least 90 deg (once cleared to advance per protocol) in order to improve tolerance to reaching.      PLAN  [x]  Upgrade activities as tolerated     [x]  Continue plan of care  []  Update interventions per flow sheet       []  Discharge due to:_  []  Other:_      Sweetie Mena PT 4/6/2022  9:04 AM    Future Appointments   Date Time Provider Davina King   4/6/2022  4:30 PM Tc Mcghee, PT [facility]   4/8/2022  3:00 PM Chikis Molina PTA [facility]   4/12/2022  5:15 PM Tc Mcghee, PT [facility]   4/15/2022  3:00 PM Reena Hickey PT [facility]   4/19/2022  6:00 PM Tc Mcghee, [place] [facility]   4/22/2022  3:00 PM Reena Hickey, PT [facility]   4/27/2022  5:15 PM Gabriela Verde, PT [facility]   4/29/2022  4:30 PM Gabriela Verde, PT [facility]

## 2022-04-08 ENCOUNTER — HOSPITAL ENCOUNTER (OUTPATIENT)
Dept: PHYSICAL THERAPY | Age: 87
Discharge: HOME | End: 2022-04-08
Payer: MEDICARE

## 2022-04-08 PROCEDURE — 97110 THERAPEUTIC EXERCISES: CPT

## 2022-04-08 PROCEDURE — 97140 MANUAL THERAPY 1/> REGIONS: CPT

## 2022-04-08 NOTE — PROGRESS NOTES
PT DAILY TREATMENT NOTE     Patient Name: Coty Campbell  Date:2022  : 10/8/1930  [x]  Patient  Verified  Payor: Angie Morataya / Plan: [facility] MEDICARE CHOICE PPO/PFFS / Product Type: Managed Care Medicare /    In time:300  Out time:338  Total Treatment Time (min): 38  Visit #: 4 of 12    Medicare/BCBS Only   Total Timed Codes (min):  38 1:1 Treatment Time:  38       Treatment Area: Right shoulder pain [M25.511]    SUBJECTIVE  Pain Level (0-10 scale): 0/10  Any medication changes, allergies to medications, adverse drug reactions, diagnosis change, or new procedure performed?: [x] No    [] Yes (see summary sheet for update)  Subjective functional status/changes:   [] No changes reported  Pt stated that he has no pain today     OBJECTIVE    15 min Therapeutic Exercise:  [x] See flow sheet :   Rationale: increase ROM, increase strength and increase proprioception to improve the patients ability to perform functional tasks with increased ease    23 min Manual Therapy:  PROM to right wrist and elbow   The manual therapy interventions were performed at a separate and distinct time from the therapeutic activities interventions. Rationale: increase ROM and increase tissue extensibility to increase ease with ADLs          With   [x] TE   [] TA   [] neuro   [] other: Patient Education: [x] Review HEP    [] Progressed/Changed HEP based on:   [] positioning   [] body mechanics   [] transfers   [] heat/ice application    [] other: educated on distal ROM only per protocol at this time but will again attempt to follow up with MD     Other Objective/Functional Measures:   Had significant decrease in tremors after PROM for the wrist and elbow  Pt was not interested in performing neck exercises  Poor strength with the gripper    Pain Level (0-10 scale) post treatment: 0/10    ASSESSMENT/Changes in Function:   Pt is making slow progress toward goals. Pt cont to be unable to don/doff sling.  Cont with decreased AROM in the right wrist and elbow. Cont with decreased right  strength. Pt reports inability to hold anything in the right hand. Pt cont to report inability to perform any ADLs with the right UE    Patient will continue to benefit from skilled PT services to modify and progress therapeutic interventions, address functional mobility deficits, address ROM deficits, address strength deficits, analyze and address soft tissue restrictions, analyze and cue movement patterns, analyze and modify body mechanics/ergonomics, assess and modify postural abnormalities and instruct in home and community integration to attain remaining goals. Progress towards goals / Updated goals:  Short Term Goals: To be accomplished in 3 treatments:              1. Patient will report compliance with initial HEP to optimize therapy outcomes. Goal met. 3/31/22  Long Term Goals: To be accomplished in 4 weeks:              1. Patient will improve FOTO score by at least 5 points in order to demonstrate functional improvement.               2. Patient will report no more than \"little difficulty\" with \"Taking off glasses or sunglasses \" with FOTO in order to demonstrate improved tolerance to ADLs.             3. Patient will be able to don/doff sling with mod I in order to demonstrate improved activity tolerance.              4. Patient will improve right elbow ext ROM to at least 45 deg from neutral in order to increase functional use of right UE.               Status at eval: 50-89 deg              5. Patient will improve right  strength to at least 20# in order to perform ADLs with increased ease.              Status at eval: 14#, 15#              6. Patient will improve right shoulder AAROM into flex and abd to at least 90 deg (once cleared to advance per protocol) in order to improve tolerance to reaching.      PLAN  [x]  Upgrade activities as tolerated     [x]  Continue plan of care  []  Update interventions per flow sheet []  Discharge due to:_  []  Other:_      Luis Ramirez, PTA 4/8/2022  9:04 AM    Future Appointments   Date Time Provider Davina King   4/8/2022  3:00 PM Erick Riedel [facility]   4/12/2022  5:15 PM Tc Marroquin, PT [facility]   4/15/2022  3:00 PM Colleen Hurley, PT [facility]   4/19/2022  6:00 PM Tc Marroquin, [place] [facility]   4/22/2022  3:00 PM Colleen Hurley, PT [facility]   4/27/2022  5:15 PM Tucker Nixon, PT [facility]   4/29/2022  4:30 PM Tucker Nixon, PT [facility]

## 2022-04-11 ENCOUNTER — TELEPHONE (OUTPATIENT)
Dept: PHYSICAL THERAPY | Age: 87
End: 2022-04-11

## 2022-04-12 ENCOUNTER — HOSPITAL ENCOUNTER (OUTPATIENT)
Dept: PHYSICAL THERAPY | Age: 87
Discharge: HOME | End: 2022-04-12
Payer: MEDICARE

## 2022-04-12 PROCEDURE — 97110 THERAPEUTIC EXERCISES: CPT

## 2022-04-12 PROCEDURE — 97140 MANUAL THERAPY 1/> REGIONS: CPT

## 2022-04-12 NOTE — PROGRESS NOTES
PT DAILY TREATMENT NOTE     Patient Name: Shila Mauro  Date:2022  : 10/8/1930  [x]  Patient  Verified  Payor: Jan Tineo / Plan: [facility] HUMAN MEDICARE CHOICE PPO/PFFS / Product Type: Managed Care Medicare /    In time: 5:15P  Out time:5:56P  Total Treatment Time (min): 41  Visit #: 5 of 12    Medicare/BCBS Only   Total Timed Codes (min): 41 1:1 Treatment Time: 41       Treatment Area: Right shoulder pain [M25.511]    SUBJECTIVE  Pain Level (0-10 scale): 0/10  Any medication changes, allergies to medications, adverse drug reactions, diagnosis change, or new procedure performed?: [x] No    [] Yes (see summary sheet for update)  Subjective functional status/changes:   [] No changes reported    Patient reports he is ok today. He felt good after last visit. OBJECTIVE       13 min Therapeutic Exercise:  [x] See flow sheet :   Rationale: increase ROM, increase strength and increase proprioception to improve the patients ability to perform ADLs with increased ease      28 min Manual Therapy:  PROM and stretching into elbow flex/ext, wrist RD/UD and pronation/supination; manual stretching of each finger into full ext; scap mobs into upward rotation   The manual therapy interventions were performed at a separate and distinct time from the therapeutic activities interventions.   Rationale: decrease pain, increase ROM and increase tissue extensibility to perform ADLs with increased ease              With   [] TE   [] TA   [] neuro   [] other: Patient Education: [x] Review HEP    [] Progressed/Changed HEP based on:   [] positioning   [] body mechanics   [] transfers   [] heat/ice application    [] other:      Other Objective/Functional Measures:     Educated patient on no activity with shoulder specifically due to protocol but that office was advised to send updates once able to progress with shoulder ROM   Able to doff sling with min A to release shoulder strap and remove sling from over elbow  Able to tolerate PROM in sitting with out reports of increased pain but tightness reported in forearm with pronation/supination  Limited range with forearm pronation/supination and radial deviation with PROM   Tremors initially with PROM into each direction eliminated with repetitions into same movement (compared with continued tremors with alternating between opposite motions)  Patient able to rest hand flatter on pillow following PROM for fingers (more noted with medial two fingers)  Patient able to perform finger to thumb pinching in full range with pointer finger and 75% range with middle finger but only able to attempt with medial two fingers  Unable to perform finger to thumb pinching with pinky finger on left  Mod A to doff sling  Elbow ext AROM: 86 deg from neutral  Educated on performing pinching exercises at home and having daughter/aide assist with donning/doffing sling so patient can rest without it (with elbow extended and palm flat) to tolerance at home  Max tactile cues for form with scap squeezes-patient able to perform on left with cues to extend elbow and act as if he was starting lawnmower  Limited scapular elevation on right vs left    Pain Level (0-10 scale) post treatment: 0/10    ASSESSMENT/Changes in Function: Patient making slow progress towards goals with skilled outpatient PT. He demonstrates decreased edema to entire right UE from start of care with edema most pronounced at elbow and in hand. Patient continues to lack functional use of right UE contributing to continued difficulty with donning/doffing sling. Patient continues to lack full ext elbow AROM and  strength on the right. He reports continued compliance with HEP at this time.      Patient will continue to benefit from skilled PT services to modify and progress therapeutic interventions, address functional mobility deficits, address ROM deficits, address strength deficits, analyze and address soft tissue restrictions, analyze and cue movement patterns, analyze and modify body mechanics/ergonomics, assess and modify postural abnormalities and instruct in home and community integration to attain remaining goals. Progress towards goals / Updated goals:  Short Term Goals: To be accomplished in 3 treatments:              1. Patient will report compliance with initial HEP to optimize therapy outcomes. Goal met. 3/31/22  Long Term Goals: To be accomplished in 4 weeks:              1. Patient will improve FOTO score by at least 5 points in order to demonstrate functional improvement.               2. Patient will report no more than \"little difficulty\" with \"Taking off glasses or sunglasses \" with FOTO in order to demonstrate improved tolerance to ADLs.             3. Patient will be able to don/doff sling with mod I in order to demonstrate improved activity tolerance. Progressing-min A to mod A (4/12/22)               4. Patient will improve right elbow ext ROM to at least 45 deg from neutral in order to increase functional use of right UE.               Status at eval: 50-89 deg  Progressing-ext 86 deg from neutral (4/12/22)              5. Patient will improve right  strength to at least 20# in order to perform ADLs with increased ease.              Status at eval: 14#, 15#              6. Patient will improve right shoulder AAROM into flex and abd to at least 90 deg (once cleared to advance per protocol) in order to improve tolerance to reaching.      PLAN  [x]  Upgrade activities as tolerated     [x]  Continue plan of care  []  Update interventions per flow sheet       []  Discharge due to:_  []  Other:_      Robbie Stafford, PT 4/12/2022  11:16 AM    Future Appointments   Date Time Provider Davina King   4/12/2022  5:15 PM Tc Galicia PT [facility]   4/15/2022  3:00 PM Rosalia Hurst PT [facility]   4/19/2022  6:00 PM Domitila Alberto [facility]   4/22/2022  3:00 PM Raj Harrison, [place] [facility]   4/27/2022  5:15 PM Anibal Manriquez PT [facility]   4/29/2022  4:30 PM Anibal Manriquez PT [facility]

## 2022-04-15 ENCOUNTER — HOSPITAL ENCOUNTER (OUTPATIENT)
Dept: PHYSICAL THERAPY | Age: 87
Discharge: HOME | End: 2022-04-15
Payer: MEDICARE

## 2022-04-15 PROCEDURE — 97140 MANUAL THERAPY 1/> REGIONS: CPT

## 2022-04-15 PROCEDURE — 97110 THERAPEUTIC EXERCISES: CPT

## 2022-04-15 NOTE — PROGRESS NOTES
PT DAILY TREATMENT NOTE     Patient Name: Lily Garces  Date:4/15/2022  : 10/8/1930  [x]  Patient  Verified  Payor: Sumaya Escudero / [facility] / Product Type: Managed Care Medicare /    In time: 3:00  Out time:3:48  Total Treatment Time (min): 48  Visit #: 6 of 12    Medicare/BCBS Only   Total Timed Codes (min):  48 1:1 Treatment Time:  48       Treatment Area: Right shoulder pain [M25.511]    SUBJECTIVE  Pain Level (0-10 scale): 0/10  Any medication changes, allergies to medications, adverse drug reactions, diagnosis change, or new procedure performed?: [x] No    [] Yes (see summary sheet for update)  Subjective functional status/changes:   [] No changes reported  Pt states he's doing okay today and that he has a follow up appointment with his doctor on Monday. He says he's ready to come out of the sling. He also states that he works on moving his hand at home and trying to touch his fingers to his thumb. OBJECTIVE    12 min Therapeutic Exercise:  [x] See flow sheet :    Rationale: increase ROM and increase strength to improve the patients ability to allow ease with ADLs    36 min Manual Therapy: PROM of right elbow, wrist and digits, STM to right bicep/tricep and wrist flexors and extensors   The manual therapy interventions were performed at a separate and distinct time from the therapeutic activities interventions.   Rationale: increase ROM and increase tissue extensibility to allow ease with ADLs          With   [] TE   [] TA   [] neuro   [] other: Patient Education: [x] Review HEP    [] Progressed/Changed HEP based on:   [] positioning   [] body mechanics   [] transfers   [] heat/ice application    [] other:      Other Objective/Functional Measures:   Increased pitting edema present in right UE  Increased soft tissue restriction throughout right UE  Decreased right  strength   Trial 1-3 10 lbs each  Decreased right elbow ROM, 85 deg extension  FOTO: 4     Pain Level (0-10 scale) post treatment: 0/10    ASSESSMENT/Changes in Function: See progress note    Patient will continue to benefit from skilled PT services to modify and progress therapeutic interventions, address functional mobility deficits, address ROM deficits, address strength deficits, analyze and address soft tissue restrictions, analyze and cue movement patterns, analyze and modify body mechanics/ergonomics, assess and modify postural abnormalities, address imbalance/dizziness and instruct in home and community integration to attain remaining goals. []  See Plan of Care  [x]  See progress note/recertification  []  See Discharge Summary         Progress towards goals / Updated goals: See note    PLAN  []  Upgrade activities as tolerated     []  Continue plan of care  []  Update interventions per flow sheet       []  Discharge due to:_  [x]  Other: See progress note     Faith Issa, [address] [place] [address] 4/15/2022  2:56 PM    I was present during the entire treatment, directing and participating in the treatment.    Baldomero Jovel DPT      Future Appointments   Date Time Provider Davina King   4/15/2022  3:00 PM Marlin Torre [facility]   4/19/2022  6:00 PM Tc Kaufman [place] [id number]   4/22/2022  3:00 PM Araceli Rivero [facility]   4/27/2022  5:15 PM Bonny Salas, PT [facility]   4/29/2022  4:30 PM Bonny Prime, PT [facility]

## 2022-04-15 NOTE — PROGRESS NOTES
[facility]  [place]  [address] [facility], [place]  [phone] fax    Continued Plan of Care/ Re-certification for Physical Therapy Services    Patient name: Raegan Ku Start of Care: 3/28/2022   Referral source: Dal Kehr, [place] : 10/8/1930               Medical Diagnosis: Right shoulder pain [M25.511]  Payor: [employer] MEDICARE / Plan: [facility] MEDIC[facility] CHOICE PPO/PFFS / Product Type: Managed Care Medicare /  Onset Date:22               Treatment Diagnosis: Right shoulder pain   Prior Hospitalization: see medical history Provider#: [id number]   Medications: Verified on Patient summary List    Comorbidities: HTN, visual impairment, hearing impairment, history of L3 compression fracture    Prior Level of Function: lives alone, has caregiver 4 hours/day 3 days/week, right-handed, Ind with ADL's     Visits from Start of Care: 6    Missed Visits: 0    The Plan of Care and following information is based on the patient's current status:  Goal: Patient will improve FOTO score by at least 5 points in order to demonstrate functional improvement. Status at last note/certification: 34  Current Status: not met    Goal: Patient will report no more than \"little difficulty\" with \"Taking off glasses or sunglasses \" with FOTO in order to demonstrate improved tolerance to ADLs. Status at last note/certification: unable   Current Status: not met    Goal: Patient will be able to don/doff sling with mod I in order to demonstrate improved activity tolerance. Status at last note/certification: mod A  Current Status: not met    Goal: Patient will improve right elbow ext ROM to at least 45 deg from neutral in order to increase functional use of right UE. Status at last note/certification: 89 degrees  Current Status: not met    Goal: Patient will improve right  strength to at least 20# in order to perform ADLs with increased ease.   Status at last note/certification: 50#  Current Status: not met    Goal: Patient will improve right shoulder AAROM into flex and abd to at least 90 deg (once cleared to advance per protocol) in order to improve tolerance to reaching. Status at last note/certification: unable   Current Status: not met      Key functional changes: pt. Continues to have significant decrease in elbow mobility and hand/wrist function      Problems/ barriers to goal attainment: none reported     Problem List: pain affecting function, decrease ROM, decrease strength, impaired gait/ balance, decrease ADL/ functional abilitiies, decrease activity tolerance, decrease flexibility/ joint mobility and decrease transfer abilities    Treatment Plan: Therapeutic exercise, Therapeutic activities, Neuromuscular re-education, Physical agent/modality, Gait/balance training, Manual therapy, Patient education, Self Care training, Functional mobility training and Home safety training     Patient Goal (s) has been updated and includes: to move arm better     Goals for this certification period to be accomplished in 4 weeks:      1. Patient will improve FOTO score by at least 5 points in order to demonstrate functional improvement. 2. Patient will report no more than \"little difficulty\" with \"Taking off glasses or sunglasses \" with FOTO in order to demonstrate improved tolerance to ADLs. 3. Patient will be able to don/doff sling with mod I in order to demonstrate improved activity tolerance. 4. Patient will improve right elbow ext ROM to at least 45 deg from neutral in order to increase functional use of right UE. Status at eval: 50-89 deg              5. Patient will improve right  strength to at least 20# in order to perform ADLs with increased ease.               Status at eval: 14#, 15#              6. Patient will improve right shoulder AAROM into flex and abd to at least 90 deg (once cleared to advance per protocol) in order to improve tolerance to reaching. Frequency / Duration: Patient to be seen 2 times per week for 4 weeks:    Assessment / Recommendations: pt. Is making limited progress towards goals. He continues to have significant decrease in right elbow, wrist, and hand mobility and strength. Right elbow extension PROM was limited at 85 degrees with firm end feel. He also has poor right  strength at 10#. Pt. Also demonstrates poor right hand dexterity and has difficulty bringing thumb to fingers. He reports compliance with his sling use but has some difficulty performing exercises frequently at home secondary to requiring mod A to don/doff sling. Skilled PT is medically necessary in order to improve right UE mobility for increased ease of ADLs and improved quality of life. He would also benefit from occupational therapy in order to improve right hand fine motor tasks. Certification Period: 4/15/22-5/14/22    Jocelynn Viveros, PT 4/15/2022 3:52 PM    ________________________________________________________________________  I certify that the above Therapy Services are being furnished while the patient is under my care. I agree with the treatment plan and certify that this therapy is necessary. [] I have read the above and request that my patient continue as recommended.   [] I have read the above report and request that my patient continue therapy with the following changes/special instructions: _______________________________________  [] I have read the above report and request that my patient be discharged from therapy    Physician's Signature:____________Date:_________TIME:________     JAY Oviedo  ** Signature, Date and Time must be completed for valid certification **    Please sign and return to [facility] [address]  [phone] fax

## 2022-04-19 ENCOUNTER — HOSPITAL ENCOUNTER (OUTPATIENT)
Dept: PHYSICAL THERAPY | Age: 87
Discharge: HOME | End: 2022-04-19
Payer: MEDICARE

## 2022-04-19 PROCEDURE — 97140 MANUAL THERAPY 1/> REGIONS: CPT

## 2022-04-19 PROCEDURE — 97110 THERAPEUTIC EXERCISES: CPT

## 2022-04-19 NOTE — PROGRESS NOTES
PT DAILY TREATMENT NOTE     Patient Name: Katie Pass  Date:2022  : 10/8/1930  [x]  Patient  Verified  Payor: Nelida Mendiola / Plan: [facility] MEDICARE CHOICE PPO/PFFS / Product Type: Managed Care Medicare /    In time:5:15P  Out time:5:56P  Total Treatment Time (min): 41  Visit #: 1 of 8    Medicare/BCBS Only   Total Timed Codes (min):  41 1:1 Treatment Time:  41       Treatment Area: Right shoulder pain [M25.511]    SUBJECTIVE  Pain Level (0-10 scale): 1-2/10  Any medication changes, allergies to medications, adverse drug reactions, diagnosis change, or new procedure performed?: [x] No    [] Yes (see summary sheet for update)  Subjective functional status/changes:   [] No changes reported    Patient reports pain to the top of his arm today. He presents without sling donned reporting that physician advised him to wear it as needed. He is interested in OT to assist with performance with his hand/wrist.    OBJECTIVE      11 min Therapeutic Exercise:  [x] See flow sheet :   Rationale: increase ROM and increase strength to improve the patients ability to perform ADLs with increased ease      30 min Manual Therapy:  PROM into shoulder scap, abd, ER; elbow ext; wrist flex/ext/RD/UD; pronation/supination; hold relax for elbow ext   The manual therapy interventions were performed at a separate and distinct time from the therapeutic activities interventions.   Rationale: decrease pain, increase ROM and increase tissue extensibility to perform ADLs with increased ease            With   [] TE   [] TA   [] neuro   [] other: Patient Education: [x] Review HEP    [] Progressed/Changed HEP based on:   [] positioning   [] body mechanics   [] transfers   [] heat/ice application    [] other:      Other Objective/Functional Measures:     Attempted pendulums-patient unable to achieve elbow ext or shoulder flex leading to lack of movement in shoulder with momentum from body  PROM: 57 deg abd 55 deg scap  Firm end feel with shoulder PROM  Limited PROM with wrist motions  Increased tremors with passive pronation/supination not decreased with repetition   Attempted digiflex at 3#-patient with increased difficulty compared with gripper    Pain Level (0-10 scale) post treatment: 0/10    ASSESSMENT/Changes in Function: Updated activities per updated physician protocol. He demonstrates limited right shoulder PROM with firm end feel likely due to prolonged sling wear/limited motion of right shoulder. He continues to demonstrate tremors to right UE initially with passive motions largely reduced with cues for relaxation and repetitive motions. Patient will continue to benefit from skilled PT services to modify and progress therapeutic interventions, address functional mobility deficits, address ROM deficits, address strength deficits, analyze and address soft tissue restrictions, analyze and cue movement patterns, analyze and modify body mechanics/ergonomics, assess and modify postural abnormalities and instruct in home and community integration to attain remaining goals. Progress towards goals / Updated goals:     1. Patient will improve FOTO score by at least 5 points in order to demonstrate functional improvement.               2. Patient will report no more than \"little difficulty\" with \"Taking off glasses or sunglasses \" with FOTO in order to demonstrate improved tolerance to ADLs.             3. Patient will be able to don/doff sling with mod I in order to demonstrate improved activity tolerance.  Progressing-per patient, patient no longer required to wear sling (4/19/22)              4. Patient will improve right elbow ext ROM to at least 45 deg from neutral in order to increase functional use of right UE.               Status at recert: ext 85 deg with firm end feel              5. Patient will improve right  strength to at least 20# in order to perform ADLs with increased ease.              Status at recert: 66#              3. Patient will improve right shoulder AAROM into flex and abd to at least 90 deg (once cleared to advance per protocol) in order to improve tolerance to reaching.  Progressing-PROM: 57 deg abd 55 deg scap (4/19/22)    PLAN  [x]  Upgrade activities as tolerated     [x]  Continue plan of care  []  Update interventions per flow sheet       []  Discharge due to:_  []  Other:_      Robbie Stafford, PT 4/19/2022  11:48 AM    Future Appointments   Date Time Provider Davina King   4/19/2022  5:15 PM Tc Galicia, PT [facility]   4/22/2022  3:00 PM Plymouth Cage [facility]   4/27/2022  5:15 PM Tony Allen, PT [facility]   4/29/2022  4:30 PM Tony Allen, PT [facility]

## 2022-04-22 ENCOUNTER — HOSPITAL ENCOUNTER (OUTPATIENT)
Dept: PHYSICAL THERAPY | Age: 87
Discharge: HOME | End: 2022-04-22
Payer: MEDICARE

## 2022-04-22 PROCEDURE — 97110 THERAPEUTIC EXERCISES: CPT

## 2022-04-22 PROCEDURE — 97140 MANUAL THERAPY 1/> REGIONS: CPT

## 2022-04-22 NOTE — PROGRESS NOTES
PT DAILY TREATMENT NOTE     Patient Name: Katie Pass  Date:2022  : 10/8/1930  [x]  Patient  Verified  Payor: Nelida Mendiola / Plan: [facility] MEDICARE CHOICE PPO/PFFS / Product Type: Managed Care Medicare /    In time: 256  Out time: 335  Total Treatment Time (min): 39  Visit #: 1 of 8    Medicare/BCBS Only   Total Timed Codes (min):  39 1:1 Treatment Time:  39       Treatment Area: Right shoulder pain [M25.511]    SUBJECTIVE  Pain Level (0-10 scale): 0/10  Any medication changes, allergies to medications, adverse drug reactions, diagnosis change, or new procedure performed?: [x] No    [] Yes (see summary sheet for update)  Subjective functional status/changes:   [] No changes reported  Pt stated that he has no pain and has been out of the sling since monday    OBJECTIVE    14 min Therapeutic Exercise:  [x] See flow sheet :   Rationale: increase ROM and increase strength to improve the patients ability to perform ADLs with increased ease    25 min Manual Therapy:  PROM into shoulder scap, abd, ER; elbow ext; wrist flex/ext/RD/UD; pronation/supination   The manual therapy interventions were performed at a separate and distinct time from the therapeutic activities interventions.   Rationale: decrease pain, increase ROM and increase tissue extensibility to perform ADLs with increased ease        With   [x] TE   [] TA   [] neuro   [] other: Patient Education: [x] Review HEP    [] Progressed/Changed HEP based on:   [] positioning   [] body mechanics   [] transfers   [] heat/ice application    [] other:      Other Objective/Functional Measures:   Attempted to perform finger tips to thumb, was able to reach first 2 fingers, but unable to reach ring finger  Minimal wrist movement even with assist  Had increased edema in the right hand, forearm and fingers today  Got OT eval scheduled  Minimal PROM for shoulder and moderate for elbow    Pain Level (0-10 scale) post treatment: 0/10    ASSESSMENT/Changes in Function:   Pt is making very slow progress toward goals. Pt has had increased edema in the right UE since coming out of the sling. Cont with decreased AROM and PROM in the right UE. Cont to be unable to sleep in the bed and sleeps in recliner. Cont with difficulty with performing most ADLs    Patient will continue to benefit from skilled PT services to modify and progress therapeutic interventions, address functional mobility deficits, address ROM deficits, address strength deficits, analyze and address soft tissue restrictions, analyze and cue movement patterns, analyze and modify body mechanics/ergonomics, assess and modify postural abnormalities and instruct in home and community integration to attain remaining goals. Progress towards goals / Updated goals:     1. Patient will improve FOTO score by at least 5 points in order to demonstrate functional improvement.               2. Patient will report no more than \"little difficulty\" with \"Taking off glasses or sunglasses \" with FOTO in order to demonstrate improved tolerance to ADLs.             3. Patient will be able to don/doff sling with mod I in order to demonstrate improved activity tolerance. Progressing-per patient, patient no longer required to wear sling (4/19/22)              4. Patient will improve right elbow ext ROM to at least 45 deg from neutral in order to increase functional use of right UE.               Status at recert: ext 85 deg with firm end feel              5. Patient will improve right  strength to at least 20# in order to perform ADLs with increased ease.              Status at recert: 84#              8. Patient will improve right shoulder AAROM into flex and abd to at least 90 deg (once cleared to advance per protocol) in order to improve tolerance to reaching.  Progressing-PROM: 57 deg abd 55 deg scap (4/19/22)    PLAN  [x]  Upgrade activities as tolerated     [x]  Continue plan of care  []  Update interventions per flow sheet       []  Discharge due to:_  []  Other:_      Warden Stock, PTA 4/22/2022  11:48 AM    Future Appointments   Date Time Provider Davina King   4/22/2022  3:00 PM [facility]   4/27/2022  5:15 PM Eri Mckeon, PT [facility]   4/29/2022  4:30 PM Eri Mckeon, PT [facility]

## 2022-04-27 ENCOUNTER — HOSPITAL ENCOUNTER (OUTPATIENT)
Dept: PHYSICAL THERAPY | Age: 87
Discharge: HOME | End: 2022-04-27
Payer: MEDICARE

## 2022-04-27 PROCEDURE — 97110 THERAPEUTIC EXERCISES: CPT

## 2022-04-27 PROCEDURE — 97140 MANUAL THERAPY 1/> REGIONS: CPT

## 2022-04-27 NOTE — PROGRESS NOTES
PT DAILY TREATMENT NOTE     Patient Name: Rohit Aguillon  Date:2022  : 10/8/1930  [x]  Patient  Verified  Payor: Arie Marshall / Plan: [facility] MEDICARE CHOICE PPO/PFFS / Product Type: Managed Care Medicare /    In time:515  Out time:558  Total Treatment Time (min): 43  Visit #: 3 of 8    Medicare/BCBS Only   Total Timed Codes (min):  43 1:1 Treatment Time:  43       Treatment Area: Right shoulder pain [M25.511]    SUBJECTIVE  Pain Level (0-10 scale): 0/10  Any medication changes, allergies to medications, adverse drug reactions, diagnosis change, or new procedure performed?: [x] No    [] Yes (see summary sheet for update)  Subjective functional status/changes:   [] No changes reported  Pt reports he is not much better.      OBJECTIVE    Modality rationale: decrease pain to improve the patients ability to ease with ROM and stretches during treatment   Min Type Additional Details    [] Estim:  []Unatt       []IFC  []Premod                        []Other:  []w/ice   []w/heat  Position:  Location:    [] Estim: []Att    []TENS instruct  []NMES                    []Other:  []w/US   []w/ice   []w/heat  Position:  Location:    []  Traction: [] Cervical       []Lumbar                       [] Prone          []Supine                       []Intermittent   []Continuous Lbs:  [] before manual  [] after manual    []  Ultrasound: []Continuous   [] Pulsed                           []1MHz   []3MHz W/cm2:  Location:    []  Iontophoresis with dexamethasone         Location: [] Take home patch   [] In clinic   10 during stretches []  Ice     [x]  heat  []  Ice massage  []  Laser   []  Anodyne Position:seated  Location:right sh    []  Laser with stim  []  Other:  Position:  Location:    []  Vasopneumatic Device    []  Right     []  Left  Pre-treatment girth:  Post-treatment girth:  Measured at (location):  Pressure:       [] lo [] med [] hi   Temperature: [] lo [] med [] hi   [] Skin assessment post-treatment: []intact []redness- no adverse reaction    []redness - adverse       20 min Therapeutic Exercise:  [] See flow sheet :   Rationale: increase ROM and increase strength to improve the patients ability to ease with adl's     23 min Manual Therapy:  Retrograde massage, Gentle PROM to shoulder and elbow, Passive stretch to wrist and digits. Scapula retraction/protraction/UT STM   The manual therapy interventions were performed at a separate and distinct time from the therapeutic activities interventions. Rationale: decrease pain, increase ROM, increase tissue extensibility, decrease edema  and decrease trigger points to ease with adl's          With   [] TE   [] TA   [] neuro   [] other: Patient Education: [x] Review HEP    [] Progressed/Changed HEP based on:   [] positioning   [] body mechanics   [] transfers   [] heat/ice application    [] other:      Other Objective/Functional Measures: Pt presents with an internally rotated elbow contracture, edema to his forearm and fingers. Passive stretch was mostly done and pt tolerated well while having heat on his shoulders. Hard and stiff end feel at end range  Pain Level (0-10 scale) post treatment: 0/1    ASSESSMENT/Changes in Function: Slow limited progress. Pt reports he does his ex's daily and now has an OT eval soon. Patient will continue to benefit from skilled PT services to modify and progress therapeutic interventions, address functional mobility deficits, address ROM deficits, address strength deficits, analyze and address soft tissue restrictions, analyze and cue movement patterns, analyze and modify body mechanics/ergonomics, assess and modify postural abnormalities and instruct in home and community integration to attain remaining goals.      []  See Plan of Care  []  See progress note/recertification  []  See Discharge Summary         Progress towards goals / Updated goals:    1. Patient will improve FOTO score by at least 5 points in order to demonstrate functional improvement.               2. Patient will report no more than \"little difficulty\" with \"Taking off glasses or sunglasses \" with FOTO in order to demonstrate improved tolerance to ADLs.             3. Patient will be able to don/doff sling with mod I in order to demonstrate improved activity tolerance.  Progressing-per patient, patient no longer required to wear sling (4/19/22)              4. Patient will improve right elbow ext ROM to at least 45 deg from neutral in order to increase functional use of right UE.               Status at recert: ext 85 deg with firm end feel              5. Patient will improve right  strength to at least 20# in order to perform ADLs with increased ease.              Status at recert: 41#              7. Patient will improve right shoulder AAROM into flex and abd to at least 90 deg (once cleared to advance per protocol) in order to improve tolerance to reaching. Progressing-PROM: 57 deg abd 55 deg scap (4/19/22)       PLAN  [x]  Upgrade activities as tolerated     [x]  Continue plan of care  []  Update interventions per flow sheet       []  Discharge due to:_  []  Other:_      Bari Howard, PT 4/27/2022  3:54 PM    Future Appointments   Date Time Provider Davina King   4/27/2022  5:15 PM Nafisa Alfredo PT [facility]   4/29/2022  4:30 PM Nafisa Alfredo PT [facility]   5/11/2022  3:00 PM Shanti Ellis OTR/L [facility]

## 2022-04-29 ENCOUNTER — HOSPITAL ENCOUNTER (OUTPATIENT)
Dept: PHYSICAL THERAPY | Age: 87
Discharge: HOME | End: 2022-04-29
Payer: MEDICARE

## 2022-04-29 PROCEDURE — 97110 THERAPEUTIC EXERCISES: CPT

## 2022-04-29 PROCEDURE — 97140 MANUAL THERAPY 1/> REGIONS: CPT

## 2022-04-29 NOTE — PROGRESS NOTES
PT DAILY TREATMENT NOTE     Patient Name: Chidi Hitchcock  Date:2022  : 10/8/1930  [x]  Patient  Verified  Payor: Millie Lincoln / Plan: [facility] [employer] MEDICARE CHOICE PPO/PFFS / Product Type: Managed Care Medicare /    In time:430  Out time:513  Total Treatment Time (min): 43  Visit #: 4 of     Medicare/BCBS Only   Total Timed Codes (min):  43 1:1 Treatment Time:  43       Treatment Area: Right shoulder pain [M25.511]    SUBJECTIVE  Pain Level (0-10 scale): 0/10  Any medication changes, allergies to medications, adverse drug reactions, diagnosis change, or new procedure performed?: [x] No    [] Yes (see summary sheet for update)  Subjective functional status/changes:   [] No changes reported  Pt's caregiver said , Mr Maday Zhong stated he is doing better.    Mr Maday Zhong stated \"his arm is no good\"    OBJECTIVE    Modality rationale: decrease pain to improve the patients ability to ease with passive stretching   Min Type Additional Details    [] Estim:  []Unatt       []IFC  []Premod                        []Other:  []w/ice   []w/heat  Position:  Location:    [] Estim: []Att    []TENS instruct  []NMES                    []Other:  []w/US   []w/ice   []w/heat  Position:  Location:    []  Traction: [] Cervical       []Lumbar                       [] Prone          []Supine                       []Intermittent   []Continuous Lbs:  [] before manual  [] after manual    []  Ultrasound: []Continuous   [] Pulsed                           []1MHz   []3MHz W/cm2:  Location:    []  Iontophoresis with dexamethasone         Location: [] Take home patch   [] In clinic   10 (during treatment) []  Ice     [x]  heat  []  Ice massage  []  Laser   []  Anodyne Position:seated  Location:right sh    []  Laser with stim  []  Other:  Position:  Location:    []  Vasopneumatic Device    []  Right     []  Left  Pre-treatment girth:  Post-treatment girth:  Measured at (location):  Pressure:       [] lo [] med [] hi   Temperature: [] lo [] med [] hi   [] Skin assessment post-treatment:  []intact []redness- no adverse reaction    []redness - adverse reaction:       20 min Therapeutic Exercise:  [] See flow sheet :   Rationale: increase ROM and increase strength to improve the patients ability to ease with adl's      23 min Manual Therapy:  Passive shoulder stretch with moist heat on shoulder./ Isometrics x 6, passive flexion/ seated scapula mobs, Ball massage to pecs, back and UT. Contrat/relax elbow flexion ext. Digit flexion/ext, wrist flexion ext. Finger splay   The manual therapy interventions were performed at a separate and distinct time from the therapeutic activities interventions. Rationale: decrease pain, increase ROM, increase tissue extensibility and decrease trigger points to ease with adl's          With   [] TE   [] TA   [] neuro   [] other: Patient Education: [x] Review HEP    [] Progressed/Changed HEP based on:   [] positioning   [] body mechanics   [] transfers   [] heat/ice application    [] other:      Other Objective/Functional Measures: Pt right , dominant shoulder in internally rotated, protracted with elbow contracture    AAROM=65 deg scaption  Flexion=unable  ER=unable  Pain=0/10  Right  strength=15#    Pain Level (0-10 scale) post treatment: 0/10    ASSESSMENT/Changes in Function: Limited function progress made. Passive stretching during moist heat on shoulder. 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 He does his HEP all day . He is unable to wash his face, shave, drink from a cup. He uses a straw.     Patient will continue to benefit from skilled PT services to modify and progress therapeutic interventions, address functional mobility deficits, address ROM deficits, address strength deficits, analyze and address soft tissue restrictions, analyze and cue movement patterns, analyze and modify body mechanics/ergonomics, assess and modify postural abnormalities and address imbalance/dizziness to attain remaining goals. [x]  See Plan of Care  []  See progress note/recertification  []  See Discharge Summary         Progress towards goals / Updated goals:    1. Patient will improve FOTO score by at least 5 points in order to demonstrate functional improvement.               2. Patient will report no more than \"little difficulty\" with \"Taking off glasses or sunglasses \" with FOTO in order to demonstrate improved tolerance to ADLs.             3. Patient will be able to don/doff sling with mod I in order to demonstrate improved activity tolerance.  Progressing-per patient, patient no longer required to wear sling (4/19/22)              4. Patient will improve right elbow ext ROM to at least 45 deg from neutral in order to increase functional use of right UE.               Status at recert: ext 85 deg with firm end feel              5. Patient will improve right  strength to at least 20# in order to perform ADLs with increased ease.              Status at recert: 74#  CURRENT Status: 15# 4/29/22              6. Patient will improve right shoulder AAROM into flex and abd to at least 90 deg (once cleared to advance per protocol) in order to improve tolerance to reaching. Progressing-PROM: 57 deg abd 55 deg scap (4/19/22)    PLAN  []  Upgrade activities as tolerated     []  Continue plan of care  []  Update interventions per flow sheet       []  Discharge due to:_  []  Other:_      Amol Gold, PT 4/29/2022  6:41 PM    Future Appointments   Date Time Provider Davina King   5/4/2022  2:15 PM Tc Alva, PT [facility]   5/6/2022  3:00 PM Margareth Lester [facility]   5/10/2022  2:15 PM Destinee Car [facility]   5/11/2022  3:00 PM Samson Sage OTR/L [facility]   5/13/2022  4:30 PM Tc Alva, PT [facility]   5/17/2022  4:30 PM Richard Castro, PT [facility]   5/20/2022  3:00 PM Tc Alva, PT [facility] 5/24/2022  3:00 PM Hudgins Carma Bence, PT [facility]   5/26/2022  3:00 PM Stefani Quiros PTA [facility]

## 2022-05-04 ENCOUNTER — HOSPITAL ENCOUNTER (OUTPATIENT)
Dept: PHYSICAL THERAPY | Age: 87
Discharge: HOME | End: 2022-05-04
Payer: MEDICARE

## 2022-05-04 PROCEDURE — 97140 MANUAL THERAPY 1/> REGIONS: CPT

## 2022-05-04 PROCEDURE — 97110 THERAPEUTIC EXERCISES: CPT

## 2022-05-04 NOTE — PROGRESS NOTES
aPT DAILY TREATMENT NOTE     Patient Name: Lily Garces  Date:2022  : 10/8/1930  [x]  Patient  Verified  Payor: Sumaya Escudero / Plan: [facility] [employer] MEDICARE CHOICE PPO/PFFS / Product Type: Managed Care Medicare /    In time: 2:20P  Out time:3:00P  Total Treatment Time (min): 40  Visit #: 5 of     Medicare/BCBS Only   Total Timed Codes (min):  40 1:1 Treatment Time:  40       Treatment Area: Right shoulder pain [M25.511]    SUBJECTIVE  Pain Level (0-10 scale): 0/10  Any medication changes, allergies to medications, adverse drug reactions, diagnosis change, or new procedure performed?: [x] No    [] Yes (see summary sheet for update)  Subjective functional status/changes:   [] No changes reported      Patient reports the heat did not really help. His elbow has been bothering him more. OBJECTIVE    15 min Therapeutic Exercise:  [] See flow sheet :   Rationale: increase ROM and increase strength to improve the patients ability to perform ADLs with increased ease      25 min Manual Therapy:  Hold/relax to promote elbow flex; manual resistance for ecc wrist ext in increased pronation   The manual therapy interventions were performed at a separate and distinct time from the therapeutic activities interventions.   Rationale: decrease pain, increase ROM and increase tissue extensibility to perform ADLs with increased ease             With   [] TE   [] TA   [] neuro   [] other: Patient Education: [x] Review HEP    [] Progressed/Changed HEP based on:   [] positioning   [] body mechanics   [] transfers   [] heat/ice application    [] other:      Other Objective/Functional Measures:      Educated patient and daughter on progress with therapy including continued decreased elbow ext and ability to progress with shoulder PROM following MD visit 22  Educated on sitting with UE resting on pillow and ensuring he is not going into flex contracture  Elbow ext AROM-to 92 deg  Min improved ability to grasp therapist's hand to perform ecc wrist ext   Self tactile feedback to keep elbow in increased ext/flat on pillow    Pain Level (0-10 scale) post treatment: 0/10    ASSESSMENT/Changes in Function: Patient demonstrates min improvement in right elbow ext AROM but continues to requires verbal cues and tactile feedback for increased relaxation into position. Patient limited by tremors with active and passive movements. He continues to demonstrate decreased functional use of right UE. He reports overall frustration with limited progress at this time. He continues to deny pain in shoulder. Patient will continue to benefit from skilled PT services to modify and progress therapeutic interventions, address functional mobility deficits, address ROM deficits, address strength deficits, analyze and address soft tissue restrictions, analyze and cue movement patterns, analyze and modify body mechanics/ergonomics, assess and modify postural abnormalities, address imbalance/dizziness and instruct in home and community integration to attain remaining goals. []  See Plan of Care  []  See progress note/recertification  []  See Discharge Summary         Progress towards goals / Updated goals:    1. Patient will improve FOTO score by at least 5 points in order to demonstrate functional improvement.               2. Patient will report no more than \"little difficulty\" with \"Taking off glasses or sunglasses \" with FOTO in order to demonstrate improved tolerance to ADLs.             3. Patient will be able to don/doff sling with mod I in order to demonstrate improved activity tolerance.  Progressing-per patient, patient no longer required to wear sling (4/19/22)              4. Patient will improve right elbow ext ROM to at least 45 deg from neutral in order to increase functional use of right UE.               Status at recert: ext 85 deg with firm end feel  88 deg from neutral following hold/relax 5/4/22                5. Patient will improve right  strength to at least 20# in order to perform ADLs with increased ease.              Status at recert: 79#  CURRENT Status: 15# 4/29/22              6. Patient will improve right shoulder AAROM into flex and abd to at least 90 deg (once cleared to advance per protocol) in order to improve tolerance to reaching. Progressing-PROM: 57 deg abd 55 deg scap (4/19/22)    PLAN  []  Upgrade activities as tolerated     []  Continue plan of care  []  Update interventions per flow sheet       []  Discharge due to:_  []  Other:_      Bronwyn Epstein, PT 5/4/2022  9:14 AM    Future Appointments   Date Time Provider Davina King   5/4/2022  2:15 PM Tc Hickman, PT [facility]   5/6/2022  3:00 PM Paramjit Dowling [facility]   5/10/2022  2:15 PM Lisset Sims [facility]   5/11/2022  3:00 PM Abril Hines, OTR/L [facility]   5/13/2022  4:30 PM Tc Hickman, PT [facility]   5/17/2022  4:30 PM Luis Alberto Gotti, PT [facility]   5/20/2022  3:00 PM Tc Hikcman, PT [facility]   5/24/2022  3:00 PM Domitila Cabrera [facility]   5/26/2022  3:00 PM Isidro Nichols PTA [facility]

## 2022-05-06 ENCOUNTER — HOSPITAL ENCOUNTER (OUTPATIENT)
Dept: PHYSICAL THERAPY | Age: 87
Discharge: HOME | End: 2022-05-06
Payer: MEDICARE

## 2022-05-06 PROCEDURE — 97140 MANUAL THERAPY 1/> REGIONS: CPT

## 2022-05-06 PROCEDURE — 97110 THERAPEUTIC EXERCISES: CPT

## 2022-05-06 NOTE — PROGRESS NOTES
aPT DAILY TREATMENT NOTE     Patient Name: Aislinn Garcia  Date:2022  : 10/8/1930  [x]  Patient  Verified  Payor: Christian Skelton / [facility]/PFFS / Product Type: Managed Care Medicare /    In time: 3:00  Out time: 3:40  Total Treatment Time (min): 40  Visit #: 6 of     Medicare/BCBS Only   Total Timed Codes (min):  40 1:1 Treatment Time:  40       Treatment Area: Right shoulder pain [M25.511]    SUBJECTIVE  Pain Level (0-10 scale): 0/10  Any medication changes, allergies to medications, adverse drug reactions, diagnosis change, or new procedure performed?: [x] No    [] Yes (see summary sheet for update)  Subjective functional status/changes:   [] No changes reported  Patient retold medical hx regarding shoulder. OBJECTIVE    15 min Therapeutic Exercise:  [] See flow sheet :   Rationale: increase ROM and increase strength to improve the patients ability to perform ADLs with increased ease      25 min Manual Therapy: Scapula mobs; PROM into shoulder scap, abd, ER; elbow ext; wrist flex/ext/RD/UD; pronation/supination; shoulder isometrics x 6; CR to promote elbow flex; Digit flexion/ext, wrist flexion/ext and finger splay   The manual therapy interventions were performed at a separate and distinct time from the therapeutic activities interventions.   Rationale: decrease pain, increase ROM and increase tissue extensibility to perform ADLs with increased ease             With   [] TE   [] TA   [] neuro   [] other: Patient Education: [x] Review HEP    [] Progressed/Changed HEP based on:   [] positioning   [] body mechanics   [] transfers   [] heat/ice application    [] other:      Other Objective/Functional Measures:     - improved ease with 6# gripper  - initiated scap mobs with MT  - most challenged with shoulder add and ER isometrics    Pain Level (0-10 scale) post treatment: 0/10    ASSESSMENT/Changes in Function:   Patient continues to make slow progress with ROM goals but demonstrates improving  strength. Decreased tremors after MT. Patient will continue to benefit from skilled PT services to modify and progress therapeutic interventions, address functional mobility deficits, address ROM deficits, address strength deficits, analyze and address soft tissue restrictions, analyze and cue movement patterns, analyze and modify body mechanics/ergonomics, assess and modify postural abnormalities, address imbalance/dizziness and instruct in home and community integration to attain remaining goals. []  See Plan of Care  []  See progress note/recertification  []  See Discharge Summary         Progress towards goals / Updated goals:  1. Patient will improve FOTO score by at least 5 points in order to demonstrate functional improvement. 2. Patient will report no more than \"little difficulty\" with \"Taking off glasses or sunglasses \" with FOTO in order to demonstrate improved tolerance to ADLs. 3. Patient will be able to don/doff sling with mod I in order to demonstrate improved activity tolerance. Progressing-per patient, patient no longer required to wear sling (4/19/22)  4. Patient will improve right elbow ext ROM to at least 45 deg from neutral in order to increase functional use of right UE.   Status at recert: ext 85 deg with firm end feel  88 deg from neutral following hold/relax 5/4/22  5. Patient will improve right  strength to at least 20# in order to perform ADLs with increased ease. Status at recert: 28#  CURRENT Status: 15# 4/29/22  6.  Patient will improve right shoulder AAROM into flex and abd to at least 90 deg (once cleared to advance per protocol) in order to improve tolerance to reaching.   Progressing-PROM: 57 deg abd 55 deg scap (4/19/22)    PLAN  []  Upgrade activities as tolerated     []  Continue plan of care  []  Update interventions per flow sheet       []  Discharge due to:_  []  Other:_      Emanuel Irving, VICKI 5/6/2022  3:40 PM    Future Appointments   Date Time Provider Davina King   5/6/2022  3:00 PM Panfilo Magallanes [facility]   5/10/2022  2:15 PM Yuri Lungsinan [facility]   5/11/2022  3:00 PM Faylene Graft, OTR/L [facility]   5/13/2022  4:30 PM Tc Mercedes, PT [facility]   5/17/2022  4:30 PM Toshia Houston, SUE [facility]   5/20/2022  3:00 PM Domitila Watts [facility]   5/24/2022  3:00 PM Domitila Watts [facility]   5/26/2022  3:00 PM Panfilo Magallanes [facility]

## 2022-05-10 ENCOUNTER — HOSPITAL ENCOUNTER (OUTPATIENT)
Dept: PHYSICAL THERAPY | Age: 87
Discharge: HOME | End: 2022-05-10
Payer: MEDICARE

## 2022-05-10 PROCEDURE — 97110 THERAPEUTIC EXERCISES: CPT

## 2022-05-10 PROCEDURE — 97140 MANUAL THERAPY 1/> REGIONS: CPT

## 2022-05-10 NOTE — PROGRESS NOTES
PT DAILY TREATMENT NOTE     Patient Name: Judith Chavez  Date:5/10/2022  : 10/8/1930  [x]  Patient  Verified  Payor: Shakira Trent / Plan: [facility] [employer] MEDICARE CHOICE PPO/PFFS / Product Type: Managed Care Medicare /    In time:2:16  Out time:2:55  Total Treatment Time (min): 39  Visit #: 7 of     Medicare/BCBS Only   Total Timed Codes (min):  39 1:1 Treatment Time:  39       Treatment Area: Right shoulder pain [M25.511]    SUBJECTIVE  Pain Level (0-10 scale): 0  Any medication changes, allergies to medications, adverse drug reactions, diagnosis change, or new procedure performed?: [x] No    [] Yes (see summary sheet for update)  Subjective functional status/changes:   [] No changes reported  Pt reports not having pain but his arm feels tight    OBJECTIVE    16 min Therapeutic Exercise:  [x] See flow sheet :   Rationale: increase ROM and increase strength to improve the patients ability to perform ADLs    23 min Manual Therapy:  Scap mobs and STM to medial border of scap. Shoulder PROM in to flex/scap and ER. Elbow and wrist PROM in all dir. STM to UT   The manual therapy interventions were performed at a separate and distinct time from the therapeutic activities interventions. Rationale: decrease pain, increase ROM and increase tissue extensibility to improve functional mobility            With   [] TE   [] TA   [] neuro   [] other: Patient Education: [x] Review HEP    [] Progressed/Changed HEP based on:   [] positioning   [] body mechanics   [] transfers   [] heat/ice application    [] other:      Other Objective/Functional Measures:      Pain Level (0-10 scale) post treatment: 0    ASSESSMENT/Changes in Function: Cont's to be very limited with right shoulder, elbow, wrist, and hand ROM. Elbow ext improves with prolonged gentle stretching. Attempted finger web but unable to finger splay enough to put tension on the web.  Demo's good tolerance to manual therapy and reports no pain following treatment today. Patient will continue to benefit from skilled PT services to modify and progress therapeutic interventions, address functional mobility deficits, address ROM deficits, address strength deficits, analyze and address soft tissue restrictions, analyze and cue movement patterns, analyze and modify body mechanics/ergonomics and assess and modify postural abnormalities to attain remaining goals. []  See Plan of Care  []  See progress note/recertification  []  See Discharge Summary         Progress towards goals / Updated goals:  1. Patient will improve FOTO score by at least 5 points in order to demonstrate functional improvement. 2. Patient will report no more than \"little difficulty\" with \"Taking off glasses or sunglasses \" with FOTO in order to demonstrate improved tolerance to ADLs. 3. Patient will be able to don/doff sling with mod I in order to demonstrate improved activity tolerance. Progressing-per patient, patient no longer required to wear sling (4/19/22)  4. Patient will improve right elbow ext ROM to at least 45 deg from neutral in order to increase functional use of right UE.   Status at recert: ext 85 deg with firm end feel  88 deg from neutral following hold/relax 5/4/22  5. Patient will improve right  strength to at least 20# in order to perform ADLs with increased ease. Status at recert: 06#  CURRENT Status: 15# 4/29/22  6.  Patient will improve right shoulder AAROM into flex and abd to at least 90 deg (once cleared to advance per protocol) in order to improve tolerance to reaching.   Progressing-PROM: 57 deg abd 55 deg scap (4/19/22)    PLAN  []  Upgrade activities as tolerated     [x]  Continue plan of care  []  Update interventions per flow sheet       []  Discharge due to:_  []  Other:_      Kala Gardner PTA 5/10/2022  2:07 PM    Future Appointments   Date Time Provider Davina King   5/10/2022  2:15 PM [facility]   5/11/2022  3:00 PM Cydney Slaughter, OTR/L [facility]   5/13/2022  4:30 PM Tc Gallegos,  [facility]   5/17/2022  4:30 PM Abdelrahman Lipoma, PT [facility]   5/20/2022  3:00 PM [place] [facility]   5/24/2022  3:00 PM TcGulf [place], [place] [facility]   5/26/2022  3:00 PM Vitaliy Maria [facility]

## 2022-05-11 ENCOUNTER — HOSPITAL ENCOUNTER (OUTPATIENT)
Dept: PHYSICAL THERAPY | Age: 87
Discharge: HOME | End: 2022-05-11
Payer: MEDICARE

## 2022-05-11 PROCEDURE — 97166 OT EVAL MOD COMPLEX 45 MIN: CPT

## 2022-05-11 NOTE — PROGRESS NOTES
OT DAILY TREATMENT NOTE/HAND EVAL     Patient Name: Lily Garces  Date:2022  : 10/8/1930  [x]  Patient  Verified  Payor: Sumaya Escudero / Plan: [facility] MEDICARE CHOICE PPO/PFFS / Product Type: Managed Care Medicare /    In time:315  Out time:345  Total Treatment Time (min): 30  Visit #: 1 of 8    Medicare/BCBS Only   Total Timed Codes (min):  0 1:1 Treatment Time:  30     Treatment Area: Right hand weakness [R29.898]  SUBJECTIVE  Pain Level (0-10 scale): 0/10  []constant []intermittent []improving []worsening []no change since onset    Pain Rating:   Current: (0-no pain 10-debilitating pain) no   At best: (0-no pain 10-debilitating pain) no  At worst: (0-no pain 10-debilitating pain) no    Any medication changes, allergies to medications, adverse drug reactions, diagnosis change, or new procedure performed?: [x] No    [] Yes (see summary sheet for update)  Subjective functional status/changes:     PLOF: Alone, help with dressing, caregiver helps transportation, meals  Limitations to PLOF: UB dressing, fasteners,  Mechanism of Injury: Surgery  Current symptoms/Complaints: Tremors, can't , write  Previous Treatment/Compliance: good  PMHx/Surgical Hx: left total shoulder replacement, HTN  Work Hx: Retired  Living Situation: Alone with caregiver and family assist  Pt Goals: Be able to use hand  Barriers: []pain []financial []time []transportation [x]other  Motivation: Fluctuating    OBJECTIVE/EXAMINATION  Domestic Life:   Activity/Recreational Limitations: not able to use right hand  Mobility: I without device  Self Care: Assist for dressing, bathing, meals, home care, driving    Precautions:Falls    Hand Dominance: right handed   Hand Involved: right    Social History:      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 Able to achieve loose fist and partial extension of digits    Strength:      Hand Strength:    Gross Grasp 3pt Pinch Lateral Pinch Tip Pinch   Right   10 12  12  12    Left  41  14  17  16     Sensation:    Appears intact      Edema: diffuse throughout hand        Nine-Hole Peg Test:  Left= 41_____seconds  Right=_Remove 2:12 minutes____seconds  ADLs  Feeding:        []MaxA   []ModA   [x]Moisés   [] CGA   []SBA   []Bruno   []Independent  UE Dressing:       [x]MaxA   []ModA   []Moisés   [] CGA   []SBA   []Bruno   []Independent  LE Dressing:       []MaxA   []ModA   [x]Moisés   [] CGA   []SBA   []Bruno   []Independent  Grooming:       []MaxA   []ModA   [x]Moisés   [] CGA   []SBA   []Bruno   []Independent  Toileting: TBA      []MaxA   []ModA   []Moisés   [] CGA   []SBA   []Bruno   []Independent  Bathing:       [x]MaxA   []ModA   []Moisés   [] CGA   []SBA   []Bruno   []Independent  Light Meal Prep:    [x]MaxA   []ModA   []Moisés   [] CGA   []SBA   []Bruno   []Independent  Household/Other: [x]MaxA   []ModA   []Moisés   [] CGA   []SBA   []Bruno   []Independent  Adaptive Equip:     []MaxA   []ModA   []Moisés   [] CGA   []SBA   []Bruno   []Independent  Driving:       [x]MaxA   []ModA   []Moisés   [] CGA   []SBA   []Bruno   []Independent      Pain Level (0-10 scale) post treatment: 0/10    ASSESSMENT/Changes in Function:    []  See Plan of Care  []  See progress note/recertification  []  See Discharge Summary             PLAN  []  Upgrade activities as tolerated     []  Continue plan of care  []  Update interventions per flow sheet       []  Discharge due to:_  []  Other:_      Lazar Easley, OTR/L 5/11/2022  3:04 PM

## 2022-05-11 NOTE — PROGRESS NOTES
[facility]  [address] [place] [address]  [phone] fax    Plan of Care/Statement of Necessity for Occupational Therapy Services    Patient name: Vanessa Lopez Start of Care: 2022   Referral source: Elizabeth Kennedy MD : 10/8/1930    Medical Diagnosis: Right hand weakness [R29.898]  Payor: Jewel Cano / Plan: [facility] MEDICARE CHOICE PPO/PFFS / Product Type: Managed Care Medicare /  Onset Date:2021    Treatment Diagnosis: Incoordination right hand   Prior Hospitalization: see medical history Provider#: [id number]   Medications: Verified on Patient summary List    Comorbidities: Right total shoulder replacement, HTN   Prior Level of Function: I self care some home care, retired [employer], liked fixing things          The Plan of Care and following information is based on the information from the initial evaluation. Assessment/ key information: 80year old RHD male who had right total shoulder replacement in 2021 and is currently unable to use right hand well due to tremors, weakness and decreased ROM. Right UE is limited in AROM throughout. Patient wi able to achieve loose fist and 10# . He can remove all pegs for 9 hole peg test, but cannot reposition items in hand due to tremors. He is currently using his left hand for most activities and requires assist of others for dressing and bathing. Family is signing checks for him. He is markedly frustrated with his inability ot use his hand and his dependence on others and makes frequent self limiting comments. His FOTO is 4/100 reflecting very severe limitations in function. He will benefit from brief skilled occupational therapy to present task modifications, home program,  and equipment to improve hand strength and coordination for increased independence.       Evaluation Complexity: History MEDIUM Complexity : Expanded review of history including physical, cognitive and psychosocial  history  Examination MEDIUM Complexity : 3-5 performance deficits relating to physical, cognitive , or psychosocial skils that result in activity limitations and / or participation restrictions Clinical Decision Making MEDIUM Complexity : Patient may present with comorbidities that affect occupational performnce. Miniml to moderate modification of tasks or assistance (eg, physical or verbal ) with assesment(s) is necessary to enable patient to complete evaluation   Overall Complexity Rating: MEDIUM    Problem List: Decreased range of motion, Decreased strength, Edema effecting function, Decreased coordination/prehension and Decreased ADL/functional abilities      Treatment Plan may include any combination of the following: Therapeutic exercise, Therapeutic activities, Physical agent/modality, Manual therapy, Patient education and ADLs/IADLs    Patient / Family readiness to learn indicated by: asking questions, trying to perform skills and interest    Persons(s) to be included in education:   patient (P) and family support person (FSP);list caregiver    Barriers to Learning/Limitations: yes;  emotional and sensory deficits-vision/hearing/speech    Patient Goal (s): Able to use hand, get right arm better    Patient Self Reported Health Status: fair    Rehabilitation Potential: fair    Short Term Goals: To be accomplished in 2 weeks:  1. Patient will be familiar with adaptive equipment to ease buttoning, and writing. 2.  Patient will be familiar with task modifications to ease donning jacket and t shirt. 3.  Patient will be independent in home program for fine motor skills and hand strengthening. Long Term Goals: To be accomplished in 4 weeks:   1. Patient will be able to sign name legibly to complete checks  2. Patient will be able to button shirt using button hook. 3.  Patient will improve right hand  at least 10# for assist with self care.   4.  Patient will be able to reposition items in hand without dropping. 5.  Patient will report improved use of right hand for self care tasks as shown by increase in FOTO of at least 15 points. Frequency / Duration: Patient to be seen 2 times per week for 4 weeks:    Patient/ Caregiver education and instruction: Diagnosis, prognosis, self care, activity modification and exercises  [x]  Plan of care has been reviewed with SWANSON    Certification Period: 5/11/22-6/9/22    SYDNIE Neal/L 5/11/2022 4:38 PM      ________________________________________________________________________    I certify that the above Therapy Services are being furnished while the patient is under my care. I agree with the treatment plan and certify that this therapy is necessary.     [de-identified] Signature:____________Date:_________TIME:________     Leatha Walter MD  ** Signature, Date and Time must be completed for valid certification **    Please sign and return to [facility]   [address] [place] [address]  [phone] fax

## 2022-05-13 ENCOUNTER — HOSPITAL ENCOUNTER (OUTPATIENT)
Dept: PHYSICAL THERAPY | Age: 87
Discharge: HOME | End: 2022-05-13
Payer: MEDICARE

## 2022-05-13 PROCEDURE — 97140 MANUAL THERAPY 1/> REGIONS: CPT

## 2022-05-13 PROCEDURE — 97110 THERAPEUTIC EXERCISES: CPT

## 2022-05-13 NOTE — PROGRESS NOTES
[facility]  [address] [facility]  [phone] fax    Continued Plan of Care/ Re-certification for Physical Therapy Services    Patient name: Saira Gil Start of Care: 3/28/2022   Referral source: Partha Rhoades, [address] : 10/8/1930   Medical/Treatment Diagnosis: Right shoulder pain [M25.511]  Payor: [employer] MEDICARE / Plan: [facility] MEDICARE CHOICE PPO/PFFS / Product Type: Managed Care Medicare /  Onset Date:22             Prior Hospitalization: see medical history Provider#: [id number]   Medications: Verified on Patient Summary List    Comorbidities: HTN, visual impairment, hearing impairment, history of L3 compression   Prior Level of Function:lives alone, has caregiver 4 hours/day 3 days/week, right-handed, Ind with ADL's   Visits from Start of Care: 13    Missed Visits: 0    The Plan of Care and following information is based on the patient's current status:  Goal: Patient will improve FOTO score by at least 5 points in order to demonstrate functional improvement. Status at last note/certification: [id number]  Current Status:NOT MET-4/10    GoalPatient will report no more than \"little difficulty\" with \"Taking off glasses or sunglasses \" with FOTO in order to demonstrate improved tolerance to ADLs. Status at last note/certification: Progressing-\"I can't do this\"  Current Status:NOT MET-\"I can't do this\"    Goal: Patient will be able to don/doff sling with mod I in order to demonstrate improved activity tolerance.   Status at last note/certification: Progressing  Current Status: Patient not required to wear sling at this time    Goal: Patient will improve right elbow ext ROM to at least 45 deg from neutral in order to increase functional use of right UE.   Status at last note/certification: [id number]-92 deg with firm end feel  Current Status: Progressing-81 deg from neutral with firm end feel    Goal: Patient will improve right  strength to at least 20# in order to perform ADLs with increased ease. Status at last note/certification:  Current Status: Progressing-11#    Goal: Patient will improve right shoulder AAROM into flex and abd to at least 90 deg (once cleared to advance per protocol) in order to improve tolerance to reaching. Status at last note/certification:  Current Status: Progressing-PROM: scap 63 deg, abd 67 deg; AROM: 40 deg scap    Key functional changes: increased right shoulder PROM      Problems/ barriers to goal attainment: limited functional use of right UE    Problem List: decrease ROM, decrease strength, decrease ADL/ functional abilitiies, decrease activity tolerance, decrease flexibility/ joint mobility and decrease transfer abilities    Treatment Plan: Therapeutic exercise, Therapeutic activities, Neuromuscular re-education, Physical agent/modality, Manual therapy, Patient education, Self Care training, Functional mobility training and Home safety training     Patient Goal (s) has been updated and includes: \"to get back to normal:     Goals for this certification period to be accomplished in 4 weeks:  1. Patient will improve right elbow ext ROM to at least 45 deg from neutral in order to increase functional use of right UE.   2. Patient will improve right  strength to at least 20# in order to perform ADLs with increased ease. 3. Patient and family will be able to verbalize understanding of PROM, scapula mobilizations, and hold/relax technique in order to continue with progression of ROM at home upon discharge. Frequency / Duration: Patient to be seen 1-2 times per week for 4 weeks:    Assessment / Recommendations:Patient making limited overall progress with therapy. He reports limited overall subjective improvement since start of care.  He continues to have limited functional use of right UE secondary to decreased ROM/strength, capsular tightness, decreased scapular mobility, and tremors with positioning. FOTO score remains unchanged at 4/100 points. Right  strength measured at 11#. Right elbow ext AROM remains severely limited at 81 deg from neurtral with firm end feel following manual interventions. Firm end feel noted with right shoulder PROM with min improvement over the last month. Right shoulder AROM not within functional limits measured at 40 deg scaption; patient unable to actively place right UE into position to achieve flex or abd due to capsular tightness/UE contractures. He continues to present with edema to right UE (most noted to wrist and hand) which he reports will vary in severity day to day. Patient has been seen for skilled outpatient occupational therapy evaluation but is currently awaiting insurance authorization to continue. Patient reports continued compliance with HEP/therapist recommendations for activities at home. Patient would benefit from continued skilled outpatient PT to address ROM, capsular tightness, decreased  strength, and to educate patient/caregivers on manual interventions in order to improve functional use of right UE. Continuation with skilled outpatient PT pending physician recommendations following upcoming appointment due to limited progress in therapy. Certification Period: 5/14/22-6/12/22    Robbie Stafford, PT 5/13/2022 5:20 PM    ________________________________________________________________________  I certify that the above Therapy Services are being furnished while the patient is under my care. I agree with the treatment plan and certify that this therapy is necessary. [] I have read the above and request that my patient continue as recommended.   [] I have read the above report and request that my patient continue therapy with the following changes/special instructions: _______________________________________  [] I have read the above report and request that my patient be discharged from therapy    Physician's Signature:____________Date:_________TIME:________     JAY Hernández  ** Signature, Date and Time must be completed for valid certification **    Please sign and return to [facility] [employer] [facility]  [address] [place] [address]  [phone] fax

## 2022-05-13 NOTE — PROGRESS NOTES
PT DAILY TREATMENT NOTE     Patient Name: Saranya Trivedi  Date:2022  : 10/8/1930  [x]  Patient  Verified  Payor: Lisa Pruett / Plan: [facility] MEDICARE CHOICE PPO/PFFS / Product Type: Managed Care Medicare /    In time:4:35P  Out time:5:12P  Total Treatment Time (min): 33  Visit #: 8 of     Medicare/BCBS Only   Total Timed Codes (min):  33 1:1 Treatment Time:  33       Treatment Area: Right shoulder pain [M25.511]    SUBJECTIVE  Pain Level (0-10 scale): 0/10  Any medication changes, allergies to medications, adverse drug reactions, diagnosis change, or new procedure performed?: [x] No    [] Yes (see summary sheet for update)  Subjective functional status/changes:   [] No changes reported      Patient reports his MD appointment is . He reports very min improvement with therapy but that his shoulder is not where it needs to be. He had an occupational therapy appointment but is waiting on approval to continue.      OBJECTIVE      23 min Therapeutic Activity:  [x]  See flow sheet : measurements, FOTO, patient education   Rationale: increase ROM, increase strength and increase proprioception  to improve the patients ability to perform ADLs with increased ease    10 min Manual Therapy: Scap mobs into elevation/depression, upward/downward rotation; protraction/retraction             With   [] TE   [x] TA   [] neuro   [] other: Patient Education: [x] Review HEP    [] Progressed/Changed HEP based on:   [] positioning   [] body mechanics   [] transfers   [] heat/ice application    [x] other: progress in therapy and findings with measurements, ability to educate daughter/nurse on manual interventions      Other Objective/Functional Measures:     Right shoulder AROM: 40 deg scap (due to positioning of UE)  Right shoulder PROM: abd 67 deg, 63 deg scap   FOTO:   Attempted to have patient stand from chair with right hand placed on armrest but using left to help perform transfer to encourage right UE use-patient unable to perform with right UE on armrest      Pain Level (0-10 scale) post treatment: 0/10    ASSESSMENT/Changes in Function: SEE RECERTIFICATION    Patient will continue to benefit from skilled PT services to modify and progress therapeutic interventions, address functional mobility deficits, address ROM deficits, address strength deficits, analyze and address soft tissue restrictions, analyze and cue movement patterns, analyze and modify body mechanics/ergonomics, assess and modify postural abnormalities, address imbalance/dizziness and instruct in home and community integration to attain remaining goals. []  See Plan of Care  [x]  See progress note/recertification  []  See Discharge Summary         Progress towards goals / Updated goals:    SEE RECERTIFICATION FOR UPDATED GOALS    1. Patient will improve FOTO score by at least 5 points in order to demonstrate functional improvement. NOT MET-4/10  2. Patient will report no more than \"little difficulty\" with \"Taking off glasses or sunglasses \" with FOTO in order to demonstrate improved tolerance to ADLs. NOT MET-\"can't do\"  3. Patient will be able to don/doff sling with mod I in order to demonstrate improved activity tolerance. Progressing-per patient, patient no longer required to wear sling (4/19/22)  4. Patient will improve right elbow ext ROM to at least 45 deg from neutral in order to increase functional use of right UE.   Status at recert: ext 85 deg with firm end feel  88 deg from neutral following hold/relax 5/4/22  81 deg from neutral (following PROM and hold relax) 5/13/22  5. Patient will improve right  strength to at least 20# in order to perform ADLs with increased ease. Status at recert: 15#  CURRENT Status: 15# 4/29/22   Progressing-11# (5/13/22)  6.  Patient will improve right shoulder AAROM into flex and abd to at least 90 deg (once cleared to advance per protocol) in order to improve tolerance to reaching.   Progressing-PROM: 57 deg abd 55 deg scap (4/19/22)    PLAN   [x]  Upgrade activities as tolerated     [x]  Continue plan of care  []  Update interventions per flow sheet       []  Discharge due to:_  []  Other:_      Archie Serra, PT 5/13/2022  8:58 AM    Future Appointments   Date Time Provider Davina King   5/13/2022  4:30 PM Tc Alva, PT [facility]   5/17/2022  4:30 PM Richard Castro PT [facility]   5/20/2022  3:00 PM Tc Alva, SUE [facility]   5/24/2022  3:00 PM Tc Alva, [place] [facility]   5/26/2022  3:00 PM Jose L Enamorado PTA [facility]

## 2022-05-16 ENCOUNTER — TELEPHONE (OUTPATIENT)
Dept: PHYSICAL THERAPY | Age: 87
End: 2022-05-16

## 2022-05-17 ENCOUNTER — HOSPITAL ENCOUNTER (OUTPATIENT)
Dept: PHYSICAL THERAPY | Age: 87
Discharge: HOME | End: 2022-05-17
Payer: MEDICARE

## 2022-05-17 PROCEDURE — 97110 THERAPEUTIC EXERCISES: CPT

## 2022-05-17 PROCEDURE — 97140 MANUAL THERAPY 1/> REGIONS: CPT

## 2022-05-17 NOTE — PROGRESS NOTES
PT DAILY TREATMENT NOTE     Patient Name: Vanessa Lopez  Date:2022  : 10/8/1930  [x]  Patient  Verified  Payor: Jewel Cano / Plan: [facility] [employer] MEDICARE CHOICE PPO/PFFS / Product Type: Managed Care Medicare /    In time:430  Out time:509  Total Treatment Time (min): 49  Visit #: 1 of 4    Medicare/BCBS Only   Total Timed Codes (min):  39 1:1 Treatment Time:  39       Treatment Area: Right shoulder pain [M25.511]    SUBJECTIVE  Pain Level (0-10 scale): 0/10  Any medication changes, allergies to medications, adverse drug reactions, diagnosis change, or new procedure performed?: [x] No    [] Yes (see summary sheet for update)  Subjective functional status/changes:   [] No changes reported  Reports he will finish up the rest of PT visits and continue with OT. An MD looked at hs elbow but no one has followed up.      Pt then reported :  \" I  feel like I'm taking this shoulder to the grave with me\"     OBJECTIVE    Modality rationale: decrease pain to improve the patients ability to ease with adl's   Min Type Additional Details    [] Estim:  []Unatt       []IFC  []Premod                        []Other:  []w/ice   []w/heat  Position:  Location:    [] Estim: []Att    []TENS instruct  []NMES                    []Other:  []w/US   []w/ice   []w/heat  Position:  Location:    []  Traction: [] Cervical       []Lumbar                       [] Prone          []Supine                       []Intermittent   []Continuous Lbs:  [] before manual  [] after manual    []  Ultrasound: []Continuous   [] Pulsed                           []1MHz   []3MHz W/cm2:  Location:    []  Iontophoresis with dexamethasone         Location: [] Take home patch   [] In clinic   10 []  Ice     [x]  heat  []  Ice massage  []  Laser   []  Anodyne Position:  Location:    []  Laser with stim  []  Other:  Position:  Location:    []  Vasopneumatic Device    []  Right     []  Left  Pre-treatment girth:  Post-treatment girth:  Measured at (location):  Pressure:       [] lo [] med [] hi   Temperature: [] lo [] med [] hi   [] Skin assessment post-treatment:  []intact []redness- no adverse reaction    []redness - adverse reaction:       19 min Therapeutic Exercise:  [] See flow sheet :   Rationale: increase ROM to improve the patients ability to ease with ADL's      20 min Manual Therapy:  PROM stretch, Scapula mobs, Shoulder isometrics. The manual therapy interventions were performed at a separate and distinct time from the therapeutic activities interventions. Rationale: decrease pain and increase ROM to ease with adl's          With   [] TE   [] TA   [] neuro   [] other: Patient Education: [x] Review HEP    [] Progressed/Changed HEP based on:   [] positioning   [] body mechanics   [] transfers   [] heat/ice application    [] other:      Other Objective/Functional Measures: Passive stretch in flexion / scaption /and ER. Pt reports no pain during moderate stretching. Pt continues to be restrricted in all directions with global hypomobility. Scapula in elevated an winged position. MH placed with shoulder supported in  Scaption/abd. Pain Level (0-10 scale) post treatment: 0/10    ASSESSMENT/Changes in Function: Limited progress. Pt reports working on HEP and trying his best to improve shoulder motion. He is very disappointed in MD's surgical outcome. He has not been able to move his elbow since the sling hs been removed. .     Patient will continue to benefit from skilled PT services to modify and progress therapeutic interventions, address functional mobility deficits, address ROM deficits, address strength deficits, analyze and address soft tissue restrictions, analyze and cue movement patterns, analyze and modify body mechanics/ergonomics, assess and modify postural abnormalities and address imbalance/dizziness to attain remaining goals.      []  See Plan of Care  [x]  See progress note/recertification  []  See Discharge Summary Progress towards goals / Updated goals:  1. Patient will improve right elbow ext ROM to at least 45 deg from neutral in order to increase functional use of right UE.   2. Patient will improve right  strength to at least 20# in order to perform ADLs with increased ease.   3. Patient and family will be able to verbalize understanding of PROM, scapula mobilizations, and hold/relax technique in order to continue with progression of ROM at home upon discharge    PLAN  [x]  Upgrade activities as tolerated     [x]  Continue plan of care  []  Update interventions per flow sheet       []  Discharge due to:_  []  Other:_      Nixon Bueno PT 5/17/2022  10:12 AM    Future Appointments   Date Time Provider Davina King   5/17/2022  4:30 PM Abdelrahman José PT [facility]   5/20/2022  3:00 PM [place], [place] [facility]   5/24/2022  3:00 PM TcBurke, [place] [facility]   5/26/2022  3:00 PM Martha Padilla PTA [facility]

## 2022-05-20 ENCOUNTER — HOSPITAL ENCOUNTER (OUTPATIENT)
Dept: PHYSICAL THERAPY | Age: 87
Discharge: HOME | End: 2022-05-20
Payer: MEDICARE

## 2022-05-20 PROCEDURE — 97110 THERAPEUTIC EXERCISES: CPT

## 2022-05-20 PROCEDURE — 97140 MANUAL THERAPY 1/> REGIONS: CPT

## 2022-05-20 PROCEDURE — 97535 SELF CARE MNGMENT TRAINING: CPT

## 2022-05-20 PROCEDURE — 97530 THERAPEUTIC ACTIVITIES: CPT

## 2022-05-20 NOTE — PROGRESS NOTES
OT DAILY TREATMENT NOTE 10-18    Patient Name: Sumeet Gunn  Date:2022  : 10/8/1930  [x]  Patient  Verified  Payor: Sanya Garcia / Plan: [facility] MEDICARE CHOICE PPO/PFFS / Product Type: Managed Care Medicare /    In time:945  Out time:1030  Total Treatment Time (min): 1030  Visit #: 2 of 8    Medicare/BCBS Only   Total Timed Codes (min):  45 1:1 Treatment Time:  45     Treatment Area: Right hand weakness [R29.898]    SUBJECTIVE  Pain Level (0-10 scale): 0/10  Any medication changes, allergies to medications, adverse drug reactions, diagnosis change, or new procedure performed?: [x] No    [] Yes (see summary sheet for update)  Subjective functional status/changes:   [] No changes reported  None of these things are going to work  There ain't nothing in that box that is going to help me    OBJECTIVE    19 min Therapeutic Exercise:  [] See flow sheet :   Rationale: increase ROM and increase strength to improve the patients ability to move Right UE    PROM Right Shoulder   AROM Digit flexion/Extension with 1# distal wrist weight   Supination/Pronation- AAROM    8 min Therapeutic Activity:  []  See flow sheet :   Rationale: increase ROM, improve coordination and activity modifications  to improve the patients ability to write, manage tremors    Tremor Education: proximal stabilization   Joint Compression (Elbow/wrist)     18 min Self Care/Home Management: Writing   Rationale: increase ROM, increase strength and improve coordination  to improve the patients ability to write    Writing: Distal 1# wrist weight, with proximal stabilization, weighted pen, steady write, built up pen, Pen again    Attempts to write with left hand due to tremors       With   [] TE   [] TA   [] neuro   [] other: Patient Education: [x] Review HEP    [] Progressed/Changed HEP based on:   [] positioning   [] body mechanics   [] transfers   [] heat/ice application   [] Splint wear/care   [] Sensory re-education   [] scar management      [] other:            Other Objective/Functional Measures: Max encouragement for task initiation    Good response to joint compression- decreased tremors noted afterwards    Pain Level (0-10 scale) post treatment: 0/10    ASSESSMENT/Changes in Function: self limiting present with multiple comments made, some guarding present     Patient will continue to benefit from skilled OT services to modify and progress therapeutic interventions, address ROM deficits, address strength deficits and instruct in home and community integration to attain remaining goals. []  See Plan of Care  []  See progress note/recertification  []  See Discharge Summary         Progress towards goals / Updated goals:  Short Term Goals: To be accomplished in 2 weeks:  1. Patient will be familiar with adaptive equipment to ease buttoning, and writing. Current Status (5/20/2022): Initiated with adaptive writing utensils    2. Patient will be familiar with task modifications to ease donning jacket and t shirt. 3.  Patient will be independent in home program for fine motor skills and hand strengthening.      Long Term Goals: To be accomplished in 4 weeks:              1.  Patient will be able to sign name legibly to complete checks  2. Patient will be able to button shirt using button hook. 3.  Patient will improve right hand  at least 10# for assist with self care. 4.  Patient will be able to reposition items in hand without dropping.   5.  Patient will report improved use of right hand for self care tasks as shown by increase in FOTO of at least 15 points.         PLAN  []  Upgrade activities as tolerated     [x]  Continue plan of care  []  Update interventions per flow sheet       []  Discharge due to:_  []  Other:_      BRENT Pina  5/20/2022  8:32 AM        Future Appointments   Date Time Provider Davina King   5/20/2022  9:45 AM Cade Banerjee [id number] [facility]   5/20/2022  3:00 PM Tc Sophie Mercedes, PT [facility]   5/24/2022  9:00 AM Stephen Hudson NADHLJR [facility]   5/24/2022  3:00 PM Tc Mercedes, PT [id number] [facility]   5/26/2022  2:15 PM Stephenkarin Hudson [id number] [facility]   5/26/2022  3:00 PM Panfilo Elpidio [facility]   5/31/2022  3:00 PM Stephen Hudson DEUNGNL [facility]   6/3/2022  3:00 PM Stephen Becca YFKFTHC [facility]   6/8/2022  2:15 PM Faylene Graft, OTR/L [facility]   6/10/2022  2:15 PM Stephen Becca PRDSNQR [facility]

## 2022-05-20 NOTE — PROGRESS NOTES
PT DAILY TREATMENT NOTE     Patient Name: Lior Hubbard  Date:2022  : 10/8/1930  [x]  Patient  Verified  Payor: Ronit Saez / Plan: [facility] [employer] MEDICARE CHOICE PPO/PFFS / Product Type: Managed Care Medicare /    In time: 3:03P  Out time:3:41P  Total Treatment Time (min): 38  Visit #: 2 of 8    Medicare/BCBS Only   Total Timed Codes (min):  38 1:1 Treatment Time:  31       Treatment Area: Right shoulder pain [M25.511]    SUBJECTIVE  Pain Level (0-10 scale): 0/10  Any medication changes, allergies to medications, adverse drug reactions, diagnosis change, or new procedure performed?: [x] No    [] Yes (see summary sheet for update)  Subjective functional status/changes:   [] No changes reported      Patient reports his doctors visit went about the same. He feels they did not do much. He reports the OT helped when moving his arm to help with his tremors. OBJECTIVE      16 min Therapeutic Exercise:  [] See flow sheet :   Rationale: increase ROM, increase strength and increase proprioception to improve the patients ability to perform ADLs with increased ease      15 min Manual Therapy:  Hold/relax to encourage elbow flex, wrist pron/supination; STM/DTM to right UT, rhomboids, and teres   The manual therapy interventions were performed at a separate and distinct time from the therapeutic activities interventions.   Rationale: decrease pain, increase ROM and increase tissue extensibility to perform ADLs with increased ease              With   [] TE   [] TA   [] neuro   [] other: Patient Education: [x] Review HEP    [] Progressed/Changed HEP based on:   [] positioning   [] body mechanics   [] transfers   [] heat/ice application    [] other:      Other Objective/Functional Measures:    -7 min-patient waiting for therapist  Decreased tremors noted with manual interventions with manual elbow compression  Unable to perform ER isometric but able to minimally assist with ER AAROM  Min blood noted on towel following removal of towel with shoulder isometrics but no visible abrasions noted by patient or therapist-patient advised to monitor for new bruising/abrasions  Patient educated on ability to allow daughter/caregiver to attend one of remaining PT visits to review passive/AA techniques but patient deferred at this time  Increased tightness noted to right UT and teres reduced with manual interventions  Educated on performing AA wand flex and ER for HEP  Educated on ability to provide pictures of all updated exercises if needed    Pain Level (0-10 scale) post treatment: 0/10    ASSESSMENT/Changes in Function: Patient making limited additional progress towards goals. He demonstrates min overall improvement in PROM following manual interventions with poor carryover between sessions. He continues to demonstrate significant right elbow flex and right shoulder add/IR contractures contributing ot increased UT and teres tightness. Patient self limiting with activities but will be able to perform them in limited range with max encouragement/demosntration. He reports continued compliance with HEP. Patient will continue to benefit from skilled PT services to modify and progress therapeutic interventions, address functional mobility deficits, address ROM deficits, address strength deficits, analyze and address soft tissue restrictions, analyze and cue movement patterns, analyze and modify body mechanics/ergonomics, assess and modify postural abnormalities, address imbalance/dizziness and instruct in home and community integration to attain remaining goals. Progress towards goals / Updated goals:  1. Patient will improve right elbow ext ROM to at least 45 deg from neutral in order to increase functional use of right UE.   2. Patient will improve right  strength to at least 20# in order to perform ADLs with increased ease.   3. Patient and family will be able to verbalize understanding of PROM, scapula mobilizations, and hold/relax technique in order to continue with progression of ROM at home upon discharge    PLAN  []  Upgrade activities as tolerated     []  Continue plan of care  []  Update interventions per flow sheet       []  Discharge due to:_  []  Other:_      Sweetie Mena, PT 5/20/2022  8:52 AM    Future Appointments   Date Time Provider Davina King   5/20/2022  9:45 AM Norman Lai [id number] [facility]   5/20/2022  3:00 PM Tc Mcghee, PT [facility]   5/24/2022  9:00 AM Norman Lai [id number] [facility]   5/24/2022  3:00 PM Tc Mcghee, PT [facility]   5/26/2022  2:15 PM Norman Lai [id number] [facility]   5/26/2022  3:00 PM Yuri Salgado, PTA [facility]   5/31/2022  3:00 PM Norman Lai LOMDSWN [facility]   6/3/2022  3:00 PM Norman Pounds EGOFBVZ [facility]   6/8/2022  2:15 PM Alan Zarate OTR/L [facility]   6/10/2022  2:15 PM Norman Pounds [id number] [facility]

## 2022-05-24 ENCOUNTER — HOSPITAL ENCOUNTER (OUTPATIENT)
Dept: PHYSICAL THERAPY | Age: 87
Discharge: HOME | End: 2022-05-24
Payer: MEDICARE

## 2022-05-24 PROCEDURE — 97530 THERAPEUTIC ACTIVITIES: CPT

## 2022-05-24 PROCEDURE — 97110 THERAPEUTIC EXERCISES: CPT

## 2022-05-24 PROCEDURE — 97140 MANUAL THERAPY 1/> REGIONS: CPT

## 2022-05-24 NOTE — PROGRESS NOTES
PT DAILY TREATMENT NOTE     Patient Name: Santana Toledo  Date:2022  : 10/8/1930  [x]  Patient  Verified  Payor: Elfego Flannery / Plan: [facility] [employer] MEDICARE CHOICE PPO/PFFS / Product Type: Managed Care Medicare /    In time:3:03P  Out time:3:41P  Total Treatment Time (min): 38  Visit #: 3 of 8    Medicare/BCBS Only   Total Timed Codes (min):  38 1:1 Treatment Time:  38       Treatment Area: Right shoulder pain [M25.511]    SUBJECTIVE  Pain Level (0-10 scale): 0/10  Any medication changes, allergies to medications, adverse drug reactions, diagnosis change, or new procedure performed?: [x] No    [] Yes (see summary sheet for update)  Subjective functional status/changes:   [] No changes reported    Patient reports he is ok today. \"My arm is still not working\". He did not find any scratches after last visit but hit his arm on a storm door yesterday. OBJECTIVE    9 min Therapeutic Exercise   Rationale: increase ROM, increase strength and increase proprioception  to improve the patients ability to perform ADLs with increased ease    20 min Therapeutic Activity:  [x]  See flow sheet : bandage change, patient education, measurements, patient education   Rationale: increase ROM, increase strength and increase proprioception  to improve the patients ability to perform ADLs with increased ease     8 min Manual Therapy:  Hold/relax for elbow ext, STM/TPR to right UT and teres   The manual therapy interventions were performed at a separate and distinct time from the therapeutic activities interventions.   Rationale: decrease pain, increase ROM, increase tissue extensibility and decrease trigger points to perform ADLs with increased ease            With   [] TE   [] TA   [] neuro   [] other: Patient Education: [x] Review HEP    [] Progressed/Changed HEP based on:   [] positioning   [] body mechanics   [] transfers   [] heat/ice application    [] other:      Other Objective/Functional Measures:    strength: left 33#, 35#, right 13#, 16#   Right elbow ext-88 deg from neutral  Following measurements of elbow ext patient with noted blood around bandage posterior forearm  Bandaging changed and removed with noted bruising to area;bleeding slowed with manual pressure  Wound cleaned with items (gauze, bandage, gloves, paper towels) disposed of within hospital regulations  Patient advised to have nursing and daughters continue to monitor this wound (and one on elbow) for delayed healing, infection, etc.  Limited range with shoulder shrugs on right, trace activation improved with manual assistance/tactile cues with scap squezes    Pain Level (0-10 scale) post treatment: 0/10    ASSESSMENT/Changes in Function: Patient making limited additional progress towards goals. He demonstrates min improved  strength since last tested an continued decreased elbow ext ROM. Patient continues to present with decreased functional use of right UE due to contractures and tremors. Plan to review isometrics, self hold/relax techniques, and AA exercises and discharge next visit. Patient will continue to benefit from skilled PT services to modify and progress therapeutic interventions, address functional mobility deficits, address ROM deficits, address strength deficits, analyze and address soft tissue restrictions, analyze and cue movement patterns, analyze and modify body mechanics/ergonomics, assess and modify postural abnormalities and instruct in home and community integration to attain remaining goals. []  See Plan of Care  []  See progress note/recertification  []  See Discharge Summary         Progress towards goals / Updated goals:  1. Patient will improve right elbow ext ROM to at least 45 deg from neutral in order to increase functional use of right UE. Progressing-88 deg from neutral (5/24/22)  2. Patient will improve right  strength to at least 20# in order to perform ADLs with increased ease.  Progressing-14.5# (5/24/22)  3. Patient and family will be able to verbalize understanding of PROM, scapula mobilizations, and hold/relax technique in order to continue with progression of ROM at home upon discharge.     PLAN  [x]  Upgrade activities as tolerated     [x]  Continue plan of care  []  Update interventions per flow sheet       []  Discharge due to:_  []  Other:_      Lucia Griffith, PT 5/24/2022  10:25 AM    Future Appointments   Date Time Provider Davina King   5/24/2022  3:00 PM Domitila Majano MQYNIED [facility]   5/26/2022  2:15 PM Tahira Montes GNZYXBS [facility]   5/26/2022  3:00 PM Josue Peck PTA PYIBWEO [facility]   5/31/2022  3:00 PM Tahira Montes KKXYOBQ [facility]   6/3/2022  3:00 PM Tahira Montes IJMJXZQ [facility]   6/8/2022  2:15 PM Corey Copeland OTR/L MMCPTPB [facility]   6/10/2022  2:15 PM aThira Montes OSDWTZH [facility]

## 2022-05-24 NOTE — PROGRESS NOTES
OT DAILY TREATMENT NOTE 10-18    Patient Name: Miguel Fitzgerald  Date:2022  : 10/8/1930  [x]  Patient  Verified  Payor: Deondre Williamson / Plan: [facility] MEDICARE CHOICE PPO/PFFS / Product Type: Managed Care Medicare /    In time:900  Out time:940  Total Treatment Time (min): 40  Visit #: 3 of 8    Medicare/BCBS Only   Total Timed Codes (min):  40 1:1 Treatment Time:  40     Treatment Area: Right hand weakness [R29.898]    SUBJECTIVE  Pain Level (0-10 scale): 0/10  Any medication changes, allergies to medications, adverse drug reactions, diagnosis change, or new procedure performed?: [x] No    [] Yes (see summary sheet for update)  Subjective functional status/changes:   [] No changes reported  My arm doesn't work    OBJECTIVE    30 min Therapeutic Exercise:  [] See flow sheet :   Rationale: increase ROM and increase strength to improve the patients ability to , move Right UE    Isometrics: with ball, beach ball, and pillow- administered HEP   Ball Exercises: B Shoulder flexion, B Elbow Extension     10 min Manual Therapy:  compression   The manual therapy interventions were performed at a separate and distinct time from the therapeutic activities interventions.   Rationale: decrease pain to improve functional use of Right UE    Manual Compression elbow/wrist    With   [] TE   [] TA   [] neuro   [] other: Patient Education: [x] Review HEP    [] Progressed/Changed HEP based on:   [] positioning   [] body mechanics   [] transfers   [] heat/ice application   [] Splint wear/care   [] Sensory re-education   [] scar management      [] other:            Other Objective/Functional Measures:     Decreased tremors with use of compression/isometric techniques      Pain Level (0-10 scale) post treatment: 0/10    ASSESSMENT/Changes in Function: self limiting present     Patient will continue to benefit from skilled OT services to modify and progress therapeutic interventions, address ROM deficits, address strength deficits and instruct in home and community integration to attain remaining goals. []  See Plan of Care  []  See progress note/recertification  []  See Discharge Summary         Progress towards goals / Updated goals:  Short Term Goals: To be accomplished in 2 weeks:  1.  Patient will be familiar with adaptive equipment to ease buttoning, and writing. Current Status (5/20/2022): Initiated with adaptive writing utensils     2. Jenise Hilario will be familiar with task modifications to ease donning jacket and t shirt.     3.  Patient will be independent in home program for fine motor skills and hand strengthening. Current Status (5/24/2022): Administered Isometrics      Long Term Goals: To be accomplished in 4 weeks:              1.  Patient will be able to sign name legibly to complete checks  2.  Patient will be able to button shirt using button hook. 3.  Patient will improve right hand  at least 10# for assist with self care. 4.  Patient will be able to reposition items in hand without dropping.   5.  Patient will report improved use of right hand for self care tasks as shown by increase in FOTO of at least 15 points.      PLAN  []  Upgrade activities as tolerated     [x]  Continue plan of care  []  Update interventions per flow sheet       []  Discharge due to:_  []  Other:_      BRENT Lei  5/24/2022  9:08 AM         Future Appointments   Date Time Provider Davina King   5/24/2022  3:00 PM Tc Kuafman, [place] MMCPTPB [facility]   5/26/2022  2:15 PM Sven Brand LCZBOFN [facility]   5/26/2022  3:00 PM Tiffany Gonzalez PTA MMCPTPB [facility]   5/31/2022  3:00 PM Sven Brand OCGRQUX [facility]   6/3/2022  3:00 PM Sven Brand BERYCWW [facility]   6/8/2022  2:15 PM SYDNIE Gilliam/RICK MMCPTPB [facility]   6/10/2022  2:15 PM Sven [facility]

## 2022-05-26 ENCOUNTER — HOSPITAL ENCOUNTER (OUTPATIENT)
Dept: PHYSICAL THERAPY | Age: 87
Discharge: HOME | End: 2022-05-26
Payer: MEDICARE

## 2022-05-26 PROCEDURE — 97530 THERAPEUTIC ACTIVITIES: CPT

## 2022-05-26 PROCEDURE — 97110 THERAPEUTIC EXERCISES: CPT

## 2022-05-26 PROCEDURE — 97140 MANUAL THERAPY 1/> REGIONS: CPT

## 2022-05-26 NOTE — PROGRESS NOTES
OT DAILY TREATMENT NOTE     Patient Name: Lily Garces  Date:2022  : 10/8/1930  [x]  Patient  Verified  Payor: Sumaya Escudero / Plan: [facility] [employer] MEDICARE CHOICE PPO/PFFS / Product Type: Managed Care Medicare /    In time:2:15 Out time:300  Total Treatment Time (min): 45  Visit #: 4 of 8    Medicare/BCBS Only   Total Timed Codes (min):  45 1:1 Treatment Time:  45     Treatment Area: Right hand weakness [R29.898]    SUBJECTIVE  Pain Level (0-10 scale): 0/10  Any medication changes, allergies to medications, adverse drug reactions, diagnosis change, or new procedure performed?: [x] No    [] Yes (see summary sheet for update)  Subjective functional status/changes:   [x] No changes reported  \"I can't seem to do anything with this arm, it doesn't work\"  \"I banged my arm on the storm door then car door. \"  \"Therapy not helping me a damn bit, the arm doesn't work! \"  OBJECTIVE      25 min Therapeutic Exercise:  [] See flow sheet :   Rationale: increase strength and improve coordination to improve the patients ability to perform daily functional tasks with right UE.  RUE:  PROM elbow extension pause/hold with compression in stance with LUE assist  Crossing midline to target 3 sets x 5-8 reps  Isometrics with small ball, towel protraction/retraction, shoulder abduction/abbduction 3 sets x 10 reps        13 min Therapeutic Activity:  []  See flow sheet :   Rationale: increase ROM and improve coordination  to improve the patients ability to utilize RUE in daily functional tasks  RUE:  Large pegs placed in peg board with LUE assisting R hand for positioning and grasp       8 min Self Care/Home Management: Review of Isometric HEP, pt demonstrated   Rationale: increase ROM and improve coordination  to improve the patients ability to facilitate RUE in ADL    With   [] TE   [] TA   [] neuro   [] other: Patient Education: [x] Review HEP    [] Progressed/Changed HEP based on:   [x] positioning   [x] body mechanics   [] transfers   [] heat/ice application   [] Splint wear/care   [] Sensory re-education   [] scar management      [] other:            Other Objective/Functional Measures:   Decreased tremors with compression during PROM and isometrics. Pain Level (0-10 scale) post treatment: 0/10    ASSESSMENT/Changes in Function: Pt continues with self limiting behaviors requiring increased verbal, tactile, and visual cues for participation in session. Patient will continue to benefit from skilled OT services to modify and progress therapeutic interventions, address ROM deficits, address strength deficits and instruct in home and community integration to attain remaining goals. []  See Plan of Care  []  See progress note/recertification  []  See Discharge Summary         Progress towards goals / Updated goals:  Short Term Goals: To be accomplished in 2 weeks:  1. Patient will be familiar with adaptive equipment to ease buttoning, and writing. Current Status (5/20/2022): Initiated with adaptive writing utensils  5/26/2022: Pt declined this date  2. Patient will be familiar with task modifications to ease donning jacket and t shirt. 5/26/2022:Pt declined this date  3. Patient will be independent in home program for fine motor skills and hand strengthening. Current Status (5/24/2022): Administered Isometrics   5/26/2022:  Reviewed isometrics for HEP, pt demonstrated with Mod to Max verbal and physical cueing     Long Term Goals: To be accomplished in 4 weeks:              1.  Patient will be able to sign name legibly to complete checks  2. Patient will be able to button shirt using button hook. 3.  Patient will improve right hand  at least 10# for assist with self care. 4.  Patient will be able to reposition items in hand without dropping. 5.  Patient will report improved use of right hand for self care tasks as shown by increase in FOTO of at least 15 points.      PLAN  []  Upgrade activities as tolerated     [x]  Continue plan of care  []  Update interventions per flow sheet       []  Discharge due to:_  []  Other:_      ANDRESSA Garza 5/26/2022  12:37 PM  Nicholas Thomas    Future Appointments   Date Time Provider Davina King   5/26/2022  2:15 PM Marisol Johnson NVIUCCG [facility]   5/26/2022  3:00 PM Della Case MMCPTPB [facility]   5/31/2022  3:00 PM Marisol Johnson AFINMQV [facility]   6/3/2022  3:00 PM Marisol Johnson HGTUGIK [facility]   6/8/2022  2:15 PM Samreen Spann OTR/L MMCPTPB [facility]   6/10/2022  2:15 PM Marisol Johnson EFZVHQM [facility]

## 2022-05-26 NOTE — PROGRESS NOTES
Physical Therapy Discharge Instructions      [facility] [employer] [facility] [employer] [facility]  [address]  [phone] fax    Patient: Mervat Vasquez  : 10/8/1930      Continue Home Exercise Program DAILY!       Continue with    [x] Ice  as needed      [x] Heat           Follow up with MD:     [x] Upon completion of therapy     [] As needed      Recommendations:     [x]   Return to activity with home program    []   Return to activity with the following modifications:       []Post Rehab Program    []Join Independent aquatic program     []Return to/join local gym      Anna Urias PTA  2022  10:28 AM

## 2022-05-26 NOTE — PROGRESS NOTES
PT DAILY TREATMENT NOTE     Patient Name: Lior Hubbard  Date:2022  : 10/8/1930  [x]  Patient  Verified  Payor: Ronit Saez / Plan: [facility] MEDICARE CHOICE PPO/PFFS / Product Type: Managed Care Medicare /    In time: 3:00  Out time: 3:45  Total Treatment Time (min): 45  Visit #: 4 of 8    Medicare/BCBS Only   Total Timed Codes (min): 45 1:1 Treatment Time: 45       Treatment Area: Right shoulder pain [M25.511]    SUBJECTIVE  Pain Level (0-10 scale): 0/10  Any medication changes, allergies to medications, adverse drug reactions, diagnosis change, or new procedure performed?: [x] No    [] Yes (see summary sheet for update)  Subjective functional status/changes:   [] No changes reported  Pt reports his arm is not hurting him but it doesn't work. OBJECTIVE    17 min Therapeutic Exercise:  [x]  See flow sheet :    Rationale: increase ROM, increase strength and increase proprioception  to improve the patients ability to perform ADLs with increased ease    8 min Therapeutic Activity:  [x]  See flow sheet : patient education   Rationale: increase ROM, increase strength and increase proprioception  to improve the patients ability to perform ADLs with increased ease     20 min Manual Therapy: hold/relax for elbow ext, STM/TPR to right UT and teres; PROM right shoulder; right pec release; right subscaprelease   The manual therapy interventions were performed at a separate and distinct time from the therapeutic activities interventions.   Rationale: decrease pain, increase ROM, increase tissue extensibility and decrease trigger points to perform ADLs with increased ease          With   [] TE   [x] TA   [] neuro   [] other: Patient Education: [x] Review HEP    [] Progressed/Changed HEP based on:   [] positioning   [] body mechanics   [] transfers   [] heat/ice application    [] other:      Other Objective/Functional Measures:    strength: left 33#, 35#, right 13#, 16#   Right elbow ext-88 deg from neutral    - review of  isometrics, self hold/relax techniques, and AA exercises with pt and family      Pain Level (0-10 scale) post treatment: 0/10    ASSESSMENT/Changes in Function: SEE DISCHARGE SUMMARY     []  See Plan of Care  []  See progress note/recertification  [x]  See Discharge Summary         Progress towards goals / Updated goals:  1. Patient will improve right elbow ext ROM to at least 45 deg from neutral in order to increase functional use of right UE.   Progressing-88 deg from neutral (5/24/22)  2. Patient will improve right  strength to at least 20# in order to perform ADLs with increased ease. Progressing-14.5# (5/24/22)  3. Patient and family will be able to verbalize understanding of PROM, scapula mobilizations, and hold/relax technique in order to continue with progression of ROM at home upon discharge.   Progressing- (5/26/22)    PLAN  [x]  Upgrade activities as tolerated     [x]  Continue plan of care  []  Update interventions per flow sheet       [x]  Discharge due to:_lack of progress in PT, patient continuing with OT  []  Other:_      Otis Bridges PTA 5/26/2022  3:45 PM    Future Appointments   Date Time Provider Davina King   5/26/2022  2:15 PM Mabel Elim XFRDLLX [facility]   5/26/2022  3:00 PM Socrates Quintana PTA MMCPTPB [facility]   5/31/2022  3:00 PM Mabel Elim OOSDJFY [facility]   6/3/2022  3:00 PM Mabel Elim ECBYTYT [facility]   6/8/2022  2:15 PM Sheryle Conn, OTR/RICK MMCPTPB [facility]   6/10/2022  2:15 PM Mabel Elim QAXOUST [facility]

## 2022-05-31 ENCOUNTER — HOSPITAL ENCOUNTER (OUTPATIENT)
Dept: PHYSICAL THERAPY | Age: 87
Discharge: HOME | End: 2022-05-31
Payer: MEDICARE

## 2022-05-31 PROCEDURE — 97535 SELF CARE MNGMENT TRAINING: CPT

## 2022-05-31 NOTE — PROGRESS NOTES
OT DAILY TREATMENT NOTE 10-18    Patient Name: Hope Rota  Date:2022  : 10/8/1930  [x]  Patient  Verified  Payor: Daniel Alfredo / Plan: [facility] HUMANA MEDICARE CHOICE PPO/PFFS / Product Type: Managed Care Medicare /    In time:300  Out time:340  Total Treatment Time (min): 40  Visit #: 5 of 8    Medicare/BCBS Only   Total Timed Codes (min):  40 1:1 Treatment Time:  40     Treatment Area: Right hand weakness [R29.898]    SUBJECTIVE  Pain Level (0-10 scale): 0/10  Any medication changes, allergies to medications, adverse drug reactions, diagnosis change, or new procedure performed?: [x] No    [] Yes (see summary sheet for update)  Subjective functional status/changes:   [] No changes reported  This shit won't work. (adaptive equipment)  That is bullshit. This is bullshit. (response to scoop dish)    OBJECTIVE    40 min Self Care/Home Management: Adaptive equipment    Rationale: increase ROM, increase strength and improve coordination  to improve the patients ability to perform functional tasks with increased independence    Introduction to: Built up utensils, scoop dish, sock aid, reacher, button hook, dressing stick, angled/curved utensils, weighted fork, swivel utensils         With   [] TE   [] TA   [] neuro   [] other: Patient Education: [x] Review HEP    [] Progressed/Changed HEP based on:   [] positioning   [] body mechanics   [] transfers   [] heat/ice application   [] Splint wear/care   [] Sensory re-education   [] scar management      [] other:            Other Objective/Functional Measures:     Able to button with buttonhook using Right UE     Pain Level (0-10 scale) post treatment: 0/10    ASSESSMENT/Changes in Function: Patient continues to show lack of interest in adaptive devices for assist with increasing independence. Fixated on surgeon/elbow surgery. Ongoing self limiting present.      Patient will continue to benefit from skilled OT services to modify and progress therapeutic interventions, address ROM deficits, address strength deficits and instruct in home and community integration to attain remaining goals. []  See Plan of Care  []  See progress note/recertification  []  See Discharge Summary         Progress towards goals / Updated goals:  Short Term Goals: To be accomplished in 2 weeks:  1.  Patient will be familiar with adaptive equipment to ease buttoning, and writing. Current Status (5/20/2022): Initiated with adaptive writing utensils  5/26/2022: Pt declined this date  2. Pamela Perfect will be familiar with task modifications to ease donning jacket and t shirt.   5/26/2022:Pt declined this date  3.  Patient will be independent in home program for fine motor skills and hand strengthening.   Current Status (5/24/2022): Administered Isometrics   5/26/2022:  Reviewed isometrics for HEP, pt demonstrated with Mod to Max verbal and physical cueing     Long Term Goals: To be accomplished in 4 weeks:              1.  Patient will be able to sign name legibly to complete checks  2.  Patient will be able to button shirt using button hook. 3.  Patient will improve right hand  at least 10# for assist with self care. 4.  Patient will be able to reposition items in hand without dropping. 5.  Patient will report improved use of right hand for self care tasks as shown by increase in FOTO of at least 15 points.      PLAN  []  Upgrade activities as tolerated     [x]  Continue plan of care  []  Update interventions per flow sheet       []  Discharge due to:_  []  Other:_      SKY Lion/L  5/31/2022  11:37 AM        Future Appointments   Date Time Provider Davina King   5/31/2022  3:00 PM Dub Elsa HPVDAPB [facility]   6/3/2022  3:00 PM Dub Elsa PALENCIAUGESK [facility]   6/8/2022  2:15 PM SYDNIE Lobato/RICK MMCPTPB [facility]   6/10/2022  2:15 PM Dub Elsa AGUYQPX [facility]

## 2022-06-03 ENCOUNTER — HOSPITAL ENCOUNTER (OUTPATIENT)
Dept: PHYSICAL THERAPY | Age: 87
Discharge: HOME | End: 2022-06-03
Payer: MEDICARE

## 2022-06-03 PROCEDURE — 97530 THERAPEUTIC ACTIVITIES: CPT

## 2022-06-03 PROCEDURE — 97110 THERAPEUTIC EXERCISES: CPT

## 2022-06-03 NOTE — PROGRESS NOTES
OT DAILY TREATMENT NOTE 10-18    Patient Name: Niyah Armenta  [id number]  : 10/8/1930  [x]  Patient  Verified  Payor: Jose Raul Rahman / Plan: [facility] HUMANA MEDICARE CHOICE PPO/PFFS / Product Type: Managed Care Medicare /    In time:300  Out time:340  Total Treatment Time (min): 40  Visit #: 6 of 8    Medicare/BCBS Only   Total Timed Codes (min):  40 1:1 Treatment Time:  40     Treatment Area: Right hand weakness [R29.898]    SUBJECTIVE  Pain Level (0-10 scale): 0/10  Any medication changes, allergies to medications, adverse drug reactions, diagnosis change, or new procedure performed?: [x] No    [] Yes (see summary sheet for update)  Subjective functional status/changes:   [] No changes reported  I have two more visits and that's it. Patient declining idea of any adaptive strategies and devices at this time.      OBJECTIVE  25 min Therapeutic Exercise:  [] See flow sheet :   Rationale: increase ROM and increase strength to improve the patients ability to     Yellow Therabar: 3 ways: 15x each   Red Digi - Right hand- Mass Flexion   Digit Taps  SF Abduction    15 min Therapeutic Activity:  []  See flow sheet :   Rationale: increase ROM and improve coordination  to improve the patients ability to manipulate small items, reposition items in hand     Repositioning: attempts to flip marker in Right hand- able to complete approx 1/4 of task before giving up    Translation of marbles: digit to palm     Dexterity Balls: Gravity eliminated on table- unable    With   [] TE   [] TA   [] neuro   [] other: Patient Education: [x] Review HEP    [] Progressed/Changed HEP based on:   [] positioning   [] body mechanics   [] transfers   [] heat/ice application   [] Splint wear/care   [] Sensory re-education   [] scar management      [] other:            Other Objective/Functional Measures:     Difficulty with all strengthening tasks due to limitations with movement in Right UE   Cueing to attempt movements with Right UE  Able to keep 2 marbles in hand before dropping them onto table   Able to complete digit taps with IF consistently, MF sometimes, unable with ulnar digits   SF Abduction: able to complete with tremors present     Pain Level (0-10 scale) post treatment: 0/10    ASSESSMENT/Changes in Function: Pt declining progressing with adaptive strategies/techniques/equipment. Self limiting affecting functional use of Right Ue    Patient will continue to benefit from skilled OT services to modify and progress therapeutic interventions, address ROM deficits, address strength deficits and instruct in home and community integration to attain remaining goals. []  See Plan of Care  []  See progress note/recertification  []  See Discharge Summary         Progress towards goals / Updated goals:  Short Term Goals: To be accomplished in 2 weeks:  1.  Patient will be familiar with adaptive equipment to ease buttoning, and writing. Current Status (5/20/2022): Initiated with adaptive writing utensils  5/26/2022: Pt declined this date  Current Status (6/3/2022): Goal Discontinued per patient request    2. Shadi Villeda will be familiar with task modifications to ease donning jacket and t shirt.   5/26/2022:Pt declined this date  Current Status (6/3/2022): Goal Discontinued per patient request    3.  Patient will be independent in home program for fine motor skills and hand strengthening.   Current Status (5/24/2022): Administered Isometrics   5/26/2022:  Reviewed isometrics for HEP, pt demonstrated with Mod to Max verbal and physical cueing  Current Status (6/3/2022):  Pt reports completing isometric exercises daily, attempted to initiated FM HEP with in clinic demonstrations with minimal success      Long Term Goals: To be accomplished in 4 weeks:              1.  Patient will be able to sign name legibly to complete checks  Current Status (6/3/2022): Goal Discontinued per patient request    2.  Patient will be able to button shirt using button hook. Current Status (6/3/2022): Goal Discontinued per patient request    3.  Patient will improve right hand  at least 10# for assist with self care. Current Status (6/3/2022): Able to manipulate yellow therabar with moderate difficulty due to limitations with ROM    4.  Patient will be able to reposition items in hand without dropping.     5.  Patient will report improved use of right hand for self care tasks as shown by increase in FOTO of at least 15 points.        PLAN  []  Upgrade activities as tolerated     [x]  Continue plan of care  []  Update interventions per flow sheet       []  Discharge due to:_  []  Other:_      Temo Patches ,SWANSON/L  6/3/2022  11:13 AM        Future Appointments   Date Time Provider Davina King   6/3/2022  3:00 PM Bonnie Encarnacion [facility] [address]   6/8/2022  2:15 PM Hamilton Mendoza OTR/L [facility] [address]   6/10/2022  2:15 PM Bonnie Encarnacion [facility] [address]

## 2022-06-08 ENCOUNTER — HOSPITAL ENCOUNTER (OUTPATIENT)
Dept: PHYSICAL THERAPY | Age: 87
Discharge: HOME | End: 2022-06-08
Payer: MEDICARE

## 2022-06-08 PROCEDURE — 97530 THERAPEUTIC ACTIVITIES: CPT

## 2022-06-08 PROCEDURE — 97535 SELF CARE MNGMENT TRAINING: CPT

## 2022-06-08 PROCEDURE — 97110 THERAPEUTIC EXERCISES: CPT

## 2022-06-08 NOTE — PROGRESS NOTES
OT DAILY TREATMENT NOTE     Patient Name: Jailene Ramirez  Date:2022  : 10/8/1930  [x]  Patient  Verified  Payor: Satnam Gonzales / Plan: [facility] HUMANA MEDICARE CHOICE PPO/PFFS / Product Type: Managed Care Medicare /    In time:215  Out time:255  Total Treatment Time (min): 40  Visit #: 7 of 8    Medicare/BCBS Only   Total Timed Codes (min):  40 1:1 Treatment Time:  40     Treatment Area: Right hand weakness [R29.898]    SUBJECTIVE  Pain Level (0-10 scale): 0/10  Any medication changes, allergies to medications, adverse drug reactions, diagnosis change, or new procedure performed?: [x] No    [] Yes (see summary sheet for update)  Subjective functional status/changes:   [] No changes reported  My shoulder is messed up    OBJECTIVE      10 min Therapeutic Exercise:  [] See flow sheet :   Rationale: increase strength to improve the patients ability to grasp  REcheck   Reviewed HEP    15 min Therapeutic Activity:  []  See flow sheet :   Rationale: improve coordination  to improve the patients ability to manipulate items  Recheck 9 hole  Review HEP       15 min Self Care/Home Management: Upper body dressing   Rationale: task modification  to improve the patients ability to don shirt independently    With   [] TE   [] TA   [] neuro   [] other: Patient Education: [x] Review HEP    [] Progressed/Changed HEP based on: UB dressing, final HEP. Progress made  [] positioning   [] body mechanics   [] transfers   [] heat/ice application   [] Splint wear/care   [] Sensory re-education   [] scar management      [] other:            Other Objective/Functional Measures:   Right  20# (was 10)   9 hole unchanged  FOTO 30 (was 4)    Pain Level (0-10 scale) post treatment: 0/10    ASSESSMENT/Changes in Function: improved , function per FOTO. Ongoing severe tremors and inability to volitionally release at times limit function.   Patient unwilling to use task modifications or equipment-just wants hand and shoulder to work. []  See Plan of Care  []  See progress note/recertification  [x]  See Discharge Summary         Progress towards goals / Updated goals:  1.  Patient will be familiar with adaptive equipment to ease buttoning, and writing. Eval status: Unaware  Current Status (6/3/2022): Goal Discontinued per patient request     2.  Patient will be familiar with task modifications to ease donning jacket and t shirt. Eval status: Unaware   Current Status (6/3/2022): Goal Discontinued per patient request     3.  Patient will be independent in home program for fine motor skills and hand strengthening.   Eval status; did not have  Discharge status; Progressing, not met Pt reports completing isometric exercises daily, attempted to initiated FM HEP with in clinic demonstrations with minimal success .     Long Term Goals: To be accomplished in 4 weeks:              1.  Patient will be able to sign name legibly to complete checks  Eval status: Unable  Discharge  Status (6/3/2022): Goal Discontinued per patient request     2.  Patient will be able to button shirt using button hook. Eval status; Unaware   Discharge Status (6/3/2022): Goal Discontinued per patient request    3.  Patient will improve right hand  at least 10# for assist with self care. Eval status;  10#  Discharge status; Met improved 10# to 20#     4.  Patient will be able to reposition items in hand without dropping. Eval status; Unable  Discharge status;  Not met     5.  Patient will report improved use of right hand for self care tasks as shown by increase in FOTO of at least 15 points  Eval status; FOTO 4  Dishcarge status; met, now 30    PLAN  []  Upgrade activities as tolerated     []  Continue plan of care  []  Update interventions per flow sheet       [x]  Discharge due to:patient request_  []  Other:_      MICH Hussein 6/8/2022  8:13 AM    Future Appointments   Date Time Provider Davina King   6/8/2022  2:15 PM Luis Feng OTR/L [facility]   6/10/2022  2:15 PM Efrain Moraes [facility]

## 2022-06-10 ENCOUNTER — APPOINTMENT (OUTPATIENT)
Dept: PHYSICAL THERAPY | Age: 87
End: 2022-06-10
Payer: MEDICARE

## 2022-06-13 NOTE — PROGRESS NOTES
[facility] [place] [employer] [facility]  [address] [facility]  [phone] fax    Occupational Therapy Discharge Summary  Patient name: Baron Kenyon Start of Care: 22   Referral source: Cooper Parry MD : 10/8/1930   Medical/Treatment Diagnosis: Right hand weakness [R29.898]  Payor: Libertad Montano / Plan: [facility] MEDICARE CHOICE PPO/PFFS / Product Type: Managed Care Medicare /  Onset Date:2021     Prior Hospitalization: see medical history Provider#: [id number]   Medications: Verified on Patient Summary List    Comorbidities: Right total shoulder replacement, HTN  Prior Level of Function: I self care some home care, retired [employer], liked fixing things                                                                     Visits from [facility]: 7    Missed Visits: 0    Reporting Period : 22 to 22    Summary of Care:Patient seen for ADL retraining, therapeutic exercises and activities. He has HEP. Patient is resistant to trying adaptive equipment or techniques. He is very frustrated and \"just wants arm back\". Tremors persist and right hand is limited in ability to  or pinch with accuracy. Right        20# (was 10)     9 hole unchanged  FOTO 30 (was 4)    1.  Patient will be familiar with adaptive equipment to ease buttoning, and writing. Eval status: Unaware  Current Status (6/3/2022): Goal Discontinued per patient request     2.  Patient will be familiar with task modifications to ease donning jacket and t shirt.   Eval status: Unaware   Current Status (6/3/2022): Goal Discontinued per patient request     3.  Patient will be independent in home program for fine motor skills and hand strengthening.   Eval status; did not have  Discharge status; Progressing, not met Pt reports completing isometric exercises daily, attempted to initiated FM HEP with in clinic demonstrations with minimal success .     Long Term Goals: To be accomplished in [place] will be able to sign name legibly to complete checks  Eval status: Unable  Discharge  Status (6/3/2022): Goal Discontinued per patient request     2.  Patient will be able to button shirt using button hook. Eval status; Unaware   Discharge Status (6/3/2022): Goal Discontinued per patient request    3.  Patient will improve right hand  at least 10# for assist with self care. Eval status;  10#  Discharge status; Met improved 10# to 20#     4.  Patient will be able to reposition items in hand without dropping. Eval status; Unable  Discharge status; Not met     5.  Patient will report improved use of right hand for self care tasks as shown by increase in FOTO of at least 15 points  Eval status; FOTO 4  Dishcarge status; met, now 30    ASSESSMENT/Changes in Function: improved , function per FOTO. Ongoing severe tremors and inability to volitionally release at times limit function.   Patient unwilling to use task modifications or equipment-just \"wants hand and arm back\"    ASSESSMENT/RECOMMENDATIONS:  [x]Discontinue therapy: []Patient has reached or is progressing toward set goals      []Patient is non-compliant or has abdicated      [x]Due to lack of appreciable progress towards set goals    SYDNIE Lewis/L 6/13/2022 1:30 PM

## 2022-06-15 NOTE — PROGRESS NOTES
[facility] - [facility] [employer]  [facility]  [address] [facility]  [phone] fax    Physical Therapy Discharge Summary    Patient name: Baron Kenyon Start of Care: 3/28/2022   Referral source: Mendel Papas [place] : 10/8/1930   Medical/Treatment Diagnosis: Right shoulder pain [M25.511]  Payor: HUMANA MEDICARE / Plan: [facility] MEDICARE CHOICE PPO/PFFS / Product Type: Managed Care Medicare /  Onset Date:22           Prior Hospitalization: see medical history Provider#: [id number]   Medications: Verified on Patient Summary List    Comorbidities: HTN, visual impairment, hearing impairment, history of L3 compression   Prior Level of Function:lives alone, has caregiver 4 hours/day 3 days/week, right-handed, Ind with ADL's     Visits from Start of Care: 17    Missed Visits: 0    Reporting Period : 22 to 22    Summary of Care:  Goal: Patient will improve right elbow ext ROM to at least 45 deg from neutral in order to increase functional use of right UE.   Status at last note/certification:  Status at discharge: NOT MET-88 deg from neutral    Goal: Patient will improve right  strength to at least 20# in order to perform ADLs with increased ease. Status at last note/certification:  Status at discharge: NOT MET-14.5#    Goal: Patient and family will be able to verbalize understanding of PROM, scapula mobilizations, and hold/relax technique in order to continue with progression of ROM at home upon discharge.   Status at last note/certification:  Status at discharge: MET-patient reports understanding, patient declined to have family/caregiver participate in interventions      ASSESSMENT/RECOMMENDATIONS: Patient made fair additional progress towards goals. He demonstrates min overall improved  strength since last tested and continued decreased elbow ext AROM.  He demonstrates min overall improvement in PROM following manual interventions with poor carryover between sessions. Patient continues to present with decreased functional use of right UE due to contractures and tremors. Patient reports understanding/compliance with progressive HEP/therapist recommendations for activities at this time.  At this time, patient is appropriate for discharge from skilled outpatient PT.      [x]Discontinue therapy: []Patient has reached or is progressing toward set goals      []Patient is non-compliant or has abdicated      [x]Due to lack of appreciable progress towards set goals    Archie Serra, PT 6/15/2022 11:54 AM

## 2022-07-21 ENCOUNTER — HOSPITAL ENCOUNTER (OUTPATIENT)
Dept: PHYSICAL THERAPY | Age: 87
Discharge: HOME | End: 2022-07-21
Payer: MEDICARE

## 2022-07-21 PROCEDURE — 97166 OT EVAL MOD COMPLEX 45 MIN: CPT

## 2022-07-21 NOTE — PROGRESS NOTES
[facility] - [address]  [phone] fax    Plan of [address]  Statement of Necessity for Occupational Therapy Services    Patient name: Gregor Toussaint Start of Care: 2022   Referral source: Mabel Chavez, * : 10/8/1930    Medical Diagnosis: Right elbow pain [M25.521]  Finger stiffness, right [M25.641]  Payor: Jef Garcia / Plan: [address] PPO/PFFS / Product Type: Managed Care Medicare /  Onset Date: 2021   Treatment Diagnosis: right elbow contracture   Prior Hospitalization: see medical history Provider#: [id number]   Medications: Verified on Patient summary List    Comorbidities: Right shoulder partial arthroscopy   Prior Level of Function: Right handed independent in self care using right hand          The Plan of Care and following information is based on the information from the initial evaluation. Assessment/ key information: 80year old RHD male who underwent right partial shoulder arthroscopy in 2021. He has had fall since. He has been seen for several months at this facility for shoulder and elbow ROM in physical therapy. He was discharged in  when he met maximum benefit from physical therapy at that time. He was referred to occupational therapy and received same at this facility from May-2022. Therapy orders were for self care skills and fine motor hand use. He was discharged with HEP for hand as well as recommendations for task modifications to improve self care. He was not interested in modifications, and just wanted his hand to work right. He was evaluated today and continues to have severe edema present throughout the right extremity, poor skin integrity and open wound on right elbow. JOSHUA and PROM of right elbow extension are essentially equal at 90-95 degrees.   He has limited wrist flexion/extension, digit flexion-extension and tremors present in right hand.  is 15#. In short, this gentleman has significant limitations that have been addressed previously without significant functional improvement. His ongoing wound is of concern and should be addressed, however this facility does not offer wound care. Patient will be discharged from occupational therapy at this facility. He is interested in pursuing a splint to extend elbow, but skin integrity is extremely poor. A facility with capacity to address wound healing and provide trial of orthoses is recommended. Thank you for this referral.     Evaluation Complexity: History MEDIUM Complexity : Expanded review of history including physical, cognitive and psychosocial  history  Examination MEDIUM Complexity : 3-5 performance deficits relating to physical, cognitive , or psychosocial skils that result in activity limitations and / or participation restrictions Clinical Decision Making MEDIUM Complexity : Patient may present with comorbidities that affect occupational performnce. Miniml to moderate modification of tasks or assistance (eg, physical or verbal ) with assesment(s) is necessary to enable patient to complete evaluation   Overall Complexity Rating: MEDIUM    Problem List: Decreased range of motion, Decreased strength, Edema effecting function, Decreased coordination/prehension, Decreased ADL/functional abilities , Decreased activity tolerance, and Decreased flexibility/joint mobility     Treatment Plan may include any combination of the following: Patient education    Patient / Family readiness to learn indicated by: asking questions, trying to perform skills, and interest    Persons(s) to be included in education:   patient (P)    Barriers to Learning/Limitations: yes;  physical    Patient Goal (s): Get arm to work again    Patient Self Reported Health Status: fair    Rehabilitation Potential: poor    Short Term Goals:  To be accomplished in 1 treatments:   Complete assessment and make recommendations. Frequency / Duration: Patient to be seen 1 times per week for 1 treatments:(eval only)    Patient/ Caregiver education and instruction: Diagnosis, prognosis, other plan to refer back to MD  [x]  Plan of care has been reviewed with SWANSON    Certification Period: 7/21/22-7/22/22    Bessie SaezASAELR/L 7/21/2022 5:40 PM      ________________________________________________________________________    I certify that the above Therapy Services are being furnished while the patient is under my care. I agree with the treatment plan and certify that this therapy is necessary.     [de-identified] Signature:____________Date:_________TIME:________     Yudelka Adhikari *  ** Signature, Date and Time must be completed for valid certification **    Please sign and return to [facility] - [facility] [employer] [facility] [employer] [facility]   [address] [place] [address]  [phone] fax

## 2022-07-21 NOTE — PROGRESS NOTES
Subjective: pt is a {left/right:290928} hand dominant, 80 y.o.y/o, male who sustained his/her injury *** and had surgery on ***. Prior level of function: ***  Pain level:(0-no pain 10-debilitating pain) {severity:5014}    Description/Location: {bodypart:99170} with c/o {relief:04105} ***   Worst pain***/10 Least pain ***/10   Activities which aggravate pain: ***   Activities which ease pain: ***  Current functional limitations/living situation: ***    Medical hx: ***    Medications: See the written copy of this report in the patient's paper medical record.        Objective:  Edema: Circumference    Right  Left   Styolid Level  ***cm  ***cm   MCP   ***cm  ***cm   PIP   ***cm  ***cm  Scar/Wound: size, color, drainage, adhesions, location: ***  Topical Changes: Color: *** Temperature: *** Other:***  Sensation:  Light Touch []WFL          [] Impaired ***   Sharp/Dull []WFL          [] Impaired ***   Pain/Temperature []WFL          [] Impaired ***   Range of Motion:     Active Passive     Norms Right Left Right Left   Shoulder Flex 0-180        Ext 0-60        abd 0-180        Horizontal add 0-45        IR 0-70        ER 0-90       Elbow Ext/flex 0-150       Forearm Supination 0-80        Pronation 0-80       Wrist Flex 0-80        Ext 0-70        Ulnar Dev 0-30        Radial Dev 0-20         Strength:    Right Left   Shoulder Flex      Ext      abd      Horizontal add      IR      ER     Elbow Ext/flex     Forearm Supination      Pronation     Wrist Flex      Ext      Ulnar Dev      Radial Dev       Hand ROM    Index Middle Ring Small Thumb    MP      CMC   PIP      MP   DIP      IP         Grijalva Abd         Radial Abd     Hand Strength:   Gross Grasp 3pt Pinch Lateral Pinch Tip Pinch   Right        Left         Nine-Hole Peg Test:  Left= _____seconds  Right=_____seconds  Finger Opposition:  Stereognosis: Right  ***/5 correct    Left  ***/5 correct       Palpation: ***    ADLs  Feeding:        []MaxA   []ModA   []Moisés [] CGA   []SBA   []Bruno   []Independent  UE Dressing:       []MaxA   []ModA   []Moisés   [] CGA   []SBA   []Bruno   []Independent  LE Dressing:       []MaxA   []ModA   []Moisés   [] CGA   []SBA   []Bruno   []Independent  Grooming:       []MaxA   []ModA   []Moisés   [] CGA   []SBA   []Bruno   []Independent  Toileting:       []MaxA   []ModA   []Moisés   [] CGA   []SBA   []Bruno   []Independent  Bathing:       []MaxA   []ModA   []Moisés   [] CGA   []SBA   []Bruno   []Independent  Light Meal Prep:    []MaxA   []ModA   []Moisés   [] CGA   []SBA   []Bruno   []Independent  Household/Other: []MaxA   []ModA   []Moisés   [] CGA   []SBA   []Bruno   []Independent  Adaptive Equip:     []MaxA   []ModA   []Moisés   [] CGA   []SBA   []Bruno   []Independent  Driving:       []MaxA   []ModA   []Moisés   [] CGA   []SBA   []Bruno   []Independent  Money Mgmt:        []MaxA   []ModA   []Moisés   [] CGA   []SBA   []Bruno   []Independent

## 2022-07-21 NOTE — PROGRESS NOTES
OT DAILY TREATMENT NOTE/HAND EVAL     Patient Name: Wm Martinez  Date:2022  : 10/8/1930  [x]  Patient  Verified  Payor: Cheli Rivera / Plan: [facility] [employer] MEDICARE CHOICE PPO/PFFS / Product Type: Managed Care Medicare /    In time:300  Out time:340  Total Treatment Time (min): 40  Visit #: 1 of 1    Medicare/BCBS Only   Total Timed Codes (min):  0 1:1 Treatment Time:  40     Treatment Area: Right elbow pain [M25.521]  Finger stiffness, right [M25.641]  SUBJECTIVE  Pain Level (0-10 scale): 0/10  []constant []intermittent []improving []worsening []no change since onset    Pain Rating:   Current: (0-no pain 10-debilitating pain) no   At best: (0-no pain 10-debilitating pain) no  At worst: (0-no pain 10-debilitating pain) no  Location: neither arm  Type:  no   Better with: Worse with:      Any medication changes, allergies to medications, adverse drug reactions, diagnosis change, or new procedure performed?: [x] No    [] Yes (see summary sheet for update)  Subjective functional status/changes:     PLOF: REtired  Limitations to PLOF: no longer using right hand for self care since injury  Mechanism of Injury: Fall  Current symptoms/Complaints: Can't use arm, hand shakes  Previous Treatment/Compliance: Attendance good, not interested in task modifications  PMHx/Surgical Hx: Shoulder surgery right, hit elbow on something per patient  Work Hx: NA  Pt Goals: Get arm to work  Barriers: []pain []financial []time []transportation [x]other  Motivation: Good    OBJECTIVE/EXAMINATION  Self Care: uses left hand for feeding    Precautions:Falls    Hand Dominance: right handed   Hand Involved: right    Social History:     Occupation/Job Requirements: NA  :     40 min [x]Eval                  []Re-Eval                  With   [] TE   [] TA   [] neuro   [] other: Patient Education: [x] Review HEP    [] Progressed/Changed HEP based on: Plan  [] positioning   [] body mechanics   [] transfers   [] heat/ice application    [] other:      Other Objective/Functional Measures:     Observation: edematous right UE with poor skin integrity  Scar/incision:   open wound present on elbow with bloody drainage present  Location:  olecranon right       Range of Motion:         norm right left     Elbow Ext/flex 0-150   P90 NT     Forearm Supination 0-80 45 80      Pronation 0-80 60 80     Wrist Flex 0-80 15 45      Ext 0-70 20 55      Ulnar Dev 0-30 20 25      Radial Dev 0-20 0 15       Hand ROM    Index Middle Ring Small Thumb     MP  40-60  50-65 40-70  30-65    CMC   PIP  35-75 35-65  35-60  30-60   10-20 MP   DIP  20-35 25-35  10-35  10-40   10-40 IP               Grijalva Abd               Radial Abd     Opposition to index and middle only    Strength:     right 15  Left 35      Edema:   Girth  Wrist right 18.7  Left  17.3  MCPs right 24.5  Left  22.5    Special Tests:     ADLs-requires assist for self care since injury.   Was instructed in task modifications and adaptive equipment on prior occupaitonal therapy and is unwilling to use thes-he \"just wants the arm to work again or cut it off\"  Pain Level (0-10 scale) post treatment: 0/10    ASSESSMENT/Changes in Function:      []  See Plan of Care  []  See progress note/recertification  [x]  See Discharge Summary             PLAN  []  Upgrade activities as tolerated     []  Continue plan of care  []  Update interventions per flow sheet       [x]  Discharge due to:not appropriate for skilled occupational herapy at this clinic at this time_  []  Other:_      MICH Rosenberg 7/21/2022  2:55 PM

## 2022-08-02 ENCOUNTER — APPOINTMENT (OUTPATIENT)
Dept: CT IMAGING | Age: 87
End: 2022-08-02
Attending: STUDENT IN AN ORGANIZED HEALTH CARE EDUCATION/TRAINING PROGRAM
Payer: MEDICARE

## 2022-08-02 ENCOUNTER — HOSPITAL ENCOUNTER (OUTPATIENT)
Age: 87
Setting detail: OBSERVATION
Discharge: HOME HEALTH | End: 2022-08-04
Attending: STUDENT IN AN ORGANIZED HEALTH CARE EDUCATION/TRAINING PROGRAM | Admitting: HOSPITALIST
Payer: MEDICARE

## 2022-08-02 ENCOUNTER — APPOINTMENT (OUTPATIENT)
Dept: GENERAL RADIOLOGY | Age: 87
End: 2022-08-02
Attending: STUDENT IN AN ORGANIZED HEALTH CARE EDUCATION/TRAINING PROGRAM
Payer: MEDICARE

## 2022-08-02 DIAGNOSIS — R77.8 ELEVATED TROPONIN: ICD-10-CM

## 2022-08-02 DIAGNOSIS — S51.011A SKIN TEAR OF RIGHT ELBOW WITHOUT COMPLICATION, INITIAL ENCOUNTER: ICD-10-CM

## 2022-08-02 DIAGNOSIS — I21.4 NSTEMI, INITIAL EPISODE OF CARE (HCC): Primary | ICD-10-CM
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
## 2022-08-02 DIAGNOSIS — M24.529 CONTRACTURE OF UPPER ARM JOINT: Chronic | ICD-10-CM

## 2022-08-02 LAB
ALBUMIN SERPL-MCNC: 3.9 G/DL (ref 3.4–5)
ALBUMIN/GLOB SERPL: 1.2 {RATIO} (ref 0.8–1.7)
ALP SERPL-CCNC: 93 U/L (ref 45–117)
ALT SERPL-CCNC: 24 U/L (ref 16–61)
ANION GAP SERPL CALC-SCNC: 10 MMOL/L (ref 3–18)
AST SERPL-CCNC: 35 U/L (ref 10–38)
BASOPHILS # BLD: 0 K/UL (ref 0–0.1)
BASOPHILS NFR BLD: 0 % (ref 0–2)
BILIRUB SERPL-MCNC: 0.7 MG/DL (ref 0.2–1)
BUN SERPL-MCNC: 23 MG/DL (ref 7–18)
BUN/CREAT SERPL: 19 (ref 12–20)
CALCIUM SERPL-MCNC: 9.7 MG/DL (ref 8.5–10.1)
CHLORIDE SERPL-SCNC: 104 MMOL/L (ref 100–111)
CK SERPL-CCNC: 234 U/L (ref 39–308)
CO2 SERPL-SCNC: 28 MMOL/L (ref 21–32)
CREAT SERPL-MCNC: 1.19 MG/DL (ref 0.6–1.3)
DIFFERENTIAL METHOD BLD: ABNORMAL
EOSINOPHIL # BLD: 0 K/UL (ref 0–0.4)
EOSINOPHIL NFR BLD: 0 % (ref 0–5)
ERYTHROCYTE [DISTWIDTH] IN BLOOD BY AUTOMATED COUNT: 13.3 % (ref 11.6–14.5)
GLOBULIN SER CALC-MCNC: 3.3 G/DL (ref 2–4)
GLUCOSE SERPL-MCNC: 115 MG/DL (ref 74–99)
HCT VFR BLD AUTO: 35.8 % (ref 36–48)
HGB BLD-MCNC: 11.7 G/DL (ref 13–16)
IMM GRANULOCYTES # BLD AUTO: 0 K/UL (ref 0–0.04)
IMM GRANULOCYTES NFR BLD AUTO: 0 % (ref 0–0.5)
LYMPHOCYTES # BLD: 0.5 K/UL (ref 0.9–3.6)
LYMPHOCYTES NFR BLD: 4 % (ref 21–52)
MAGNESIUM SERPL-MCNC: 2 MG/DL (ref 1.6–2.6)
MCH RBC QN AUTO: 30 PG (ref 24–34)
MCHC RBC AUTO-ENTMCNC: 32.7 G/DL (ref 31–37)
MCV RBC AUTO: 91.8 FL (ref 78–100)
MONOCYTES # BLD: 0.7 K/UL (ref 0.05–1.2)
MONOCYTES NFR BLD: 6 % (ref 3–10)
NEUTS SEG # BLD: 10 K/UL (ref 1.8–8)
NEUTS SEG NFR BLD: 89 % (ref 40–73)
NRBC # BLD: 0 K/UL (ref 0–0.01)
NRBC BLD-RTO: 0 PER 100 WBC
PLATELET # BLD AUTO: 173 K/UL (ref 135–420)
PMV BLD AUTO: 9.7 FL (ref 9.2–11.8)
POTASSIUM SERPL-SCNC: 4.1 MMOL/L (ref 3.5–5.5)
PROT SERPL-MCNC: 7.2 G/DL (ref 6.4–8.2)
RBC # BLD AUTO: 3.9 M/UL (ref 4.35–5.65)
SODIUM SERPL-SCNC: 142 MMOL/L (ref 136–145)
TROPONIN-HIGH SENSITIVITY: 343 NG/L (ref 0–78)
TROPONIN-HIGH SENSITIVITY: 606 NG/L (ref 0–78)
WBC # BLD AUTO: 11.3 K/UL (ref 4.6–13.2)

## 2022-08-02 PROCEDURE — 84484 ASSAY OF TROPONIN QUANT: CPT

## 2022-08-02 PROCEDURE — 82550 ASSAY OF CK (CPK): CPT

## 2022-08-02 PROCEDURE — 74011250637 HC RX REV CODE- 250/637: Performed by: STUDENT IN AN ORGANIZED HEALTH CARE EDUCATION/TRAINING PROGRAM

## 2022-08-02 PROCEDURE — 72125 CT NECK SPINE W/O DYE: CPT

## 2022-08-02 PROCEDURE — 80053 COMPREHEN METABOLIC PANEL: CPT

## 2022-08-02 PROCEDURE — 85025 COMPLETE CBC W/AUTO DIFF WBC: CPT

## 2022-08-02 PROCEDURE — 73100 X-RAY EXAM OF WRIST: CPT

## 2022-08-02 PROCEDURE — 73070 X-RAY EXAM OF ELBOW: CPT

## 2022-08-02 PROCEDURE — 93005 ELECTROCARDIOGRAM TRACING: CPT

## 2022-08-02 PROCEDURE — 83735 ASSAY OF MAGNESIUM: CPT

## 2022-08-02 PROCEDURE — 99285 EMERGENCY DEPT VISIT HI MDM: CPT

## 2022-08-02 PROCEDURE — 70450 CT HEAD/BRAIN W/O DYE: CPT

## 2022-08-02 PROCEDURE — 73030 X-RAY EXAM OF SHOULDER: CPT

## 2022-08-02 RX ORDER — GUAIFENESIN 100 MG/5ML
324 LIQUID (ML) ORAL DAILY
Status: DISCONTINUED | OUTPATIENT
Start: 2022-08-03 | End: 2022-08-03

## 2022-08-02 RX ORDER — GUAIFENESIN 100 MG/5ML
324 LIQUID (ML) ORAL
Status: DISCONTINUED | OUTPATIENT
Start: 2022-08-02 | End: 2022-08-02

## 2022-08-02 RX ORDER — GUAIFENESIN 100 MG/5ML
324 LIQUID (ML) ORAL
Status: COMPLETED | OUTPATIENT
Start: 2022-08-02 | End: 2022-08-02

## 2022-08-02 RX ADMIN — ASPIRIN 81 MG CHEWABLE TABLET 324 MG: 81 TABLET CHEWABLE at 18:10

## 2022-08-02 NOTE — ED NOTES
Patient arrives to ED with report of fall, patient was weed pulling in flower bed and fell, was unable to get up. Daughter found patient after he had been on the ground for two hours. Patient has prior injury with set fracture to RUE, skin tear to LUE and abrasions to face. Denies LOC, patient not on blood thinners.

## 2022-08-02 NOTE — Clinical Note
Status[de-identified] INPATIENT [101]   Type of Bed: Telemetry [19]   Cardiac Monitoring Required?: Yes   Inpatient Hospitalization Certified Necessary for the Following Reasons: 3.  Patient receiving treatment that can only be provided in an inpatient setting (further clarification in H&P documentation)   Admitting Diagnosis: NSTEMI (non-ST elevated myocardial infarction) [facility]) [[id number]]   Admitting Physician: Kristi Sanchez   Attending Physician: Kristi Sanchez   Estimated Length of Stay: 2 Midnights   Discharge Plan[de-identified] Home with Office Follow-up

## 2022-08-02 NOTE — ED NOTES
IV cannula gauge 20 inserted at left hand,fixed. blood taken for investigations, IV flushed with 10 cc normal saline,blood specimens sent to laboratory.

## 2022-08-02 NOTE — ED NOTES
Patient signed out to me is a 72-year-old male who fell into his garden, was found to have wound tears, negative CTH and Cspine, was pending xrays of right extremity. Troponin of 300 but no active chest pain, repeat with elevation to 600, plan was to contact cards if troponin elevated, patient did not have EKG so we will wait for EKG before contacting cardiology. Patient is noted to have right elbow baseline contracture still if no new fracture or dislocation then nothing to do musculoskeletal wise. Patient dispose currently now pending x-rays and cards recommendations.

## 2022-08-03 ENCOUNTER — APPOINTMENT (OUTPATIENT)
Dept: NON INVASIVE DIAGNOSTICS | Age: 87
End: 2022-08-03
Attending: INTERNAL MEDICINE
Payer: MEDICARE

## 2022-08-03 PROBLEM — I10 PRIMARY HYPERTENSION: Chronic | Status: ACTIVE | Noted: 2022-08-03

## 2022-08-03 PROBLEM — Z85.46 HISTORY OF PROSTATE CANCER: Chronic | Status: ACTIVE | Noted: 2022-08-03

## 2022-08-03 PROBLEM — I21.4 NSTEMI (NON-ST ELEVATED MYOCARDIAL INFARCTION) (HCC): Status: ACTIVE | Noted: 2022-08-03
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
## 2022-08-03 PROBLEM — J44.9 COPD (CHRONIC OBSTRUCTIVE PULMONARY DISEASE) (HCC): Chronic | Status: ACTIVE | Noted: 2022-08-03

## 2022-08-03 PROBLEM — I21.4 NSTEMI (NON-ST ELEVATED MYOCARDIAL INFARCTION) (HCC): Status: RESOLVED | Noted: 2022-08-03 | Resolved: 2022-08-03

## 2022-08-03 PROBLEM — J30.89 ENVIRONMENTAL AND SEASONAL ALLERGIES: Chronic | Status: ACTIVE | Noted: 2022-08-03

## 2022-08-03 PROBLEM — M24.529 CONTRACTURE OF UPPER ARM JOINT: Chronic | Status: ACTIVE | Noted: 2022-08-03

## 2022-08-03 PROBLEM — K21.9 GERD (GASTROESOPHAGEAL REFLUX DISEASE): Chronic | Status: ACTIVE | Noted: 2022-08-03

## 2022-08-03 PROBLEM — R77.8 ELEVATED TROPONIN: Status: ACTIVE | Noted: 2022-08-03

## 2022-08-03 PROBLEM — Z87.891 FORMER SMOKER: Chronic | Status: ACTIVE | Noted: 2022-08-03

## 2022-08-03 LAB
ALBUMIN SERPL-MCNC: 3.3 G/DL (ref 3.4–5)
ALBUMIN/GLOB SERPL: 1.1 {RATIO} (ref 0.8–1.7)
ALP SERPL-CCNC: 70 U/L (ref 45–117)
ALT SERPL-CCNC: 22 U/L (ref 16–61)
ANION GAP SERPL CALC-SCNC: 5 MMOL/L (ref 3–18)
APPEARANCE UR: CLEAR
AST SERPL-CCNC: 36 U/L (ref 10–38)
ATRIAL RATE: 68 BPM
ATRIAL RATE: 88 BPM
BASOPHILS # BLD: 0 K/UL (ref 0–0.1)
BASOPHILS NFR BLD: 1 % (ref 0–2)
BILIRUB SERPL-MCNC: 0.6 MG/DL (ref 0.2–1)
BILIRUB UR QL: NEGATIVE
BUN SERPL-MCNC: 23 MG/DL (ref 7–18)
BUN/CREAT SERPL: 23 (ref 12–20)
CALCIUM SERPL-MCNC: 8.5 MG/DL (ref 8.5–10.1)
CALCULATED P AXIS, ECG09: 100 DEGREES
CALCULATED P AXIS, ECG09: 71 DEGREES
CALCULATED R AXIS, ECG10: -57 DEGREES
CALCULATED R AXIS, ECG10: -72 DEGREES
CALCULATED T AXIS, ECG11: 36 DEGREES
CALCULATED T AXIS, ECG11: 91 DEGREES
CHLORIDE SERPL-SCNC: 106 MMOL/L (ref 100–111)
CK SERPL-CCNC: 345 U/L (ref 39–308)
CO2 SERPL-SCNC: 31 MMOL/L (ref 21–32)
COLOR UR: YELLOW
CREAT SERPL-MCNC: 1.01 MG/DL (ref 0.6–1.3)
DIAGNOSIS, 93000: NORMAL
DIAGNOSIS, 93000: NORMAL
DIFFERENTIAL METHOD BLD: ABNORMAL
ECHO AO ROOT DIAM: 3.6 CM
ECHO AO ROOT INDEX: 1.82 CM/M2
ECHO AV AREA VTI: 0.7 CM2
ECHO AV AREA/BSA VTI: 0.4 CM2/M2
ECHO AV MEAN GRADIENT: 24 MMHG
ECHO AV PEAK GRADIENT: 42 MMHG
ECHO AV PEAK VELOCITY: 3.2 M/S
ECHO LV E' LATERAL VELOCITY: 7 CM/S
ECHO LV E' SEPTAL VELOCITY: 7 CM/S
ECHO LV FRACTIONAL SHORTENING: 45 % (ref 28–44)
ECHO LV INTERNAL DIMENSION DIASTOLE INDEX: 2.02 CM/M2
ECHO LV INTERNAL DIMENSION DIASTOLIC: 4 CM (ref 4.2–5.9)
ECHO LV INTERNAL DIMENSION SYSTOLIC INDEX: 1.11 CM/M2
ECHO LV INTERNAL DIMENSION SYSTOLIC: 2.2 CM
ECHO LV IVSD: 1.1 CM (ref 0.6–1)
ECHO LV MASS 2D: 145.6 G (ref 88–224)
ECHO LV MASS INDEX 2D: 73.6 G/M2 (ref 49–115)
ECHO LV POSTERIOR WALL DIASTOLIC: 1.1 CM (ref 0.6–1)
ECHO LV RELATIVE WALL THICKNESS RATIO: 0.55
ECHO MV A VELOCITY: 0.79 M/S
ECHO MV E VELOCITY: 0.46 M/S
ECHO MV E/A RATIO: 0.58
ECHO MV E/E' LATERAL: 6.57
ECHO MV E/E' RATIO (AVERAGED): 6.57
ECHO MV E/E' SEPTAL: 6.57
ECHO RV TAPSE: 2.6 CM (ref 1.7–?)
EOSINOPHIL # BLD: 0.1 K/UL (ref 0–0.4)
EOSINOPHIL NFR BLD: 1 % (ref 0–5)
ERYTHROCYTE [DISTWIDTH] IN BLOOD BY AUTOMATED COUNT: 13.5 % (ref 11.6–14.5)
GLOBULIN SER CALC-MCNC: 2.9 G/DL (ref 2–4)
GLUCOSE SERPL-MCNC: 119 MG/DL (ref 74–99)
GLUCOSE UR STRIP.AUTO-MCNC: NEGATIVE MG/DL
HCT VFR BLD AUTO: 34.2 % (ref 36–48)
HGB BLD-MCNC: 11 G/DL (ref 13–16)
HGB UR QL STRIP: NEGATIVE
IMM GRANULOCYTES # BLD AUTO: 0 K/UL (ref 0–0.04)
IMM GRANULOCYTES NFR BLD AUTO: 0 % (ref 0–0.5)
KETONES UR QL STRIP.AUTO: NEGATIVE MG/DL
LEUKOCYTE ESTERASE UR QL STRIP.AUTO: NEGATIVE
LYMPHOCYTES # BLD: 1 K/UL (ref 0.9–3.6)
LYMPHOCYTES NFR BLD: 14 % (ref 21–52)
MCH RBC QN AUTO: 30.1 PG (ref 24–34)
MCHC RBC AUTO-ENTMCNC: 32.2 G/DL (ref 31–37)
MCV RBC AUTO: 93.4 FL (ref 78–100)
MONOCYTES # BLD: 0.8 K/UL (ref 0.05–1.2)
MONOCYTES NFR BLD: 11 % (ref 3–10)
NEUTS SEG # BLD: 5.4 K/UL (ref 1.8–8)
NEUTS SEG NFR BLD: 74 % (ref 40–73)
NITRITE UR QL STRIP.AUTO: NEGATIVE
NRBC # BLD: 0 K/UL (ref 0–0.01)
NRBC BLD-RTO: 0 PER 100 WBC
P-R INTERVAL, ECG05: 166 MS
P-R INTERVAL, ECG05: 166 MS
PH UR STRIP: 7 [PH] (ref 5–8)
PLATELET # BLD AUTO: 152 K/UL (ref 135–420)
PMV BLD AUTO: 10.1 FL (ref 9.2–11.8)
POTASSIUM SERPL-SCNC: 4.2 MMOL/L (ref 3.5–5.5)
PROT SERPL-MCNC: 6.2 G/DL (ref 6.4–8.2)
PROT UR STRIP-MCNC: NEGATIVE MG/DL
Q-T INTERVAL, ECG07: 392 MS
Q-T INTERVAL, ECG07: 432 MS
QRS DURATION, ECG06: 100 MS
QRS DURATION, ECG06: 122 MS
QTC CALCULATION (BEZET), ECG08: 459 MS
QTC CALCULATION (BEZET), ECG08: 474 MS
RBC # BLD AUTO: 3.66 M/UL (ref 4.35–5.65)
SODIUM SERPL-SCNC: 142 MMOL/L (ref 136–145)
SP GR UR REFRACTOMETRY: 1.01 (ref 1–1.03)
TROPONIN-HIGH SENSITIVITY: 302 NG/L (ref 0–78)
UROBILINOGEN UR QL STRIP.AUTO: 0.2 EU/DL (ref 0.2–1)
VENTRICULAR RATE, ECG03: 68 BPM
VENTRICULAR RATE, ECG03: 88 BPM
WBC # BLD AUTO: 7.3 K/UL (ref 4.6–13.2)

## 2022-08-03 PROCEDURE — 93306 TTE W/DOPPLER COMPLETE: CPT

## 2022-08-03 PROCEDURE — 97530 THERAPEUTIC ACTIVITIES: CPT

## 2022-08-03 PROCEDURE — 93005 ELECTROCARDIOGRAM TRACING: CPT

## 2022-08-03 PROCEDURE — 77030040831 HC BAG URINE DRNG MDII -A

## 2022-08-03 PROCEDURE — 74011000250 HC RX REV CODE- 250: Performed by: INTERNAL MEDICINE

## 2022-08-03 PROCEDURE — 97535 SELF CARE MNGMENT TRAINING: CPT

## 2022-08-03 PROCEDURE — 74011250637 HC RX REV CODE- 250/637: Performed by: INTERNAL MEDICINE

## 2022-08-03 PROCEDURE — G0378 HOSPITAL OBSERVATION PER HR: HCPCS

## 2022-08-03 PROCEDURE — 2709999900 HC NON-CHARGEABLE SUPPLY

## 2022-08-03 PROCEDURE — 81003 URINALYSIS AUTO W/O SCOPE: CPT

## 2022-08-03 PROCEDURE — 82550 ASSAY OF CK (CPK): CPT

## 2022-08-03 PROCEDURE — 99205 OFFICE O/P NEW HI 60 MIN: CPT | Performed by: INTERNAL MEDICINE

## 2022-08-03 PROCEDURE — 99222 1ST HOSP IP/OBS MODERATE 55: CPT | Performed by: INTERNAL MEDICINE

## 2022-08-03 PROCEDURE — 74011250637 HC RX REV CODE- 250/637: Performed by: PHYSICIAN ASSISTANT

## 2022-08-03 PROCEDURE — 94640 AIRWAY INHALATION TREATMENT: CPT

## 2022-08-03 PROCEDURE — 97165 OT EVAL LOW COMPLEX 30 MIN: CPT

## 2022-08-03 PROCEDURE — 84484 ASSAY OF TROPONIN QUANT: CPT

## 2022-08-03 PROCEDURE — 65270000046 HC RM TELEMETRY

## 2022-08-03 PROCEDURE — 80053 COMPREHEN METABOLIC PANEL: CPT

## 2022-08-03 PROCEDURE — 36415 COLL VENOUS BLD VENIPUNCTURE: CPT

## 2022-08-03 PROCEDURE — 85025 COMPLETE CBC W/AUTO DIFF WBC: CPT

## 2022-08-03 RX ORDER — CETIRIZINE HCL 10 MG
10 TABLET ORAL DAILY
Status: DISCONTINUED | OUTPATIENT
Start: 2022-08-03 | End: 2022-08-04 | Stop reason: HOSPADM

## 2022-08-03 RX ORDER — CALCIUM CARB/MAGNESIUM CARB 311-232MG
5 TABLET ORAL
Status: DISCONTINUED | OUTPATIENT
Start: 2022-08-03 | End: 2022-08-04 | Stop reason: HOSPADM

## 2022-08-03 RX ORDER — SODIUM CHLORIDE 0.9 % (FLUSH) 0.9 %
5-40 SYRINGE (ML) INJECTION EVERY 8 HOURS
Status: DISCONTINUED | OUTPATIENT
Start: 2022-08-03 | End: 2022-08-04 | Stop reason: HOSPADM

## 2022-08-03 RX ORDER — FAMOTIDINE 20 MG/1
40 TABLET, FILM COATED ORAL 2 TIMES DAILY
Status: DISCONTINUED | OUTPATIENT
Start: 2022-08-03 | End: 2022-08-03 | Stop reason: DRUGHIGH

## 2022-08-03 RX ORDER — ONDANSETRON 2 MG/ML
4 INJECTION INTRAMUSCULAR; INTRAVENOUS
Status: DISCONTINUED | OUTPATIENT
Start: 2022-08-03 | End: 2022-08-04 | Stop reason: HOSPADM

## 2022-08-03 RX ORDER — LISINOPRIL 10 MG/1
10 TABLET ORAL DAILY
Status: DISCONTINUED | OUTPATIENT
Start: 2022-08-03 | End: 2022-08-04 | Stop reason: HOSPADM

## 2022-08-03 RX ORDER — SODIUM CHLORIDE 0.9 % (FLUSH) 0.9 %
5-40 SYRINGE (ML) INJECTION AS NEEDED
Status: DISCONTINUED | OUTPATIENT
Start: 2022-08-03 | End: 2022-08-04 | Stop reason: HOSPADM

## 2022-08-03 RX ORDER — POLYETHYLENE GLYCOL 3350 17 G/17G
17 POWDER, FOR SOLUTION ORAL DAILY PRN
Status: DISCONTINUED | OUTPATIENT
Start: 2022-08-03 | End: 2022-08-04 | Stop reason: HOSPADM

## 2022-08-03 RX ORDER — GUAIFENESIN 100 MG/5ML
81 LIQUID (ML) ORAL DAILY
Status: DISCONTINUED | OUTPATIENT
Start: 2022-08-03 | End: 2022-08-04 | Stop reason: HOSPADM

## 2022-08-03 RX ORDER — IPRATROPIUM BROMIDE AND ALBUTEROL SULFATE 2.5; .5 MG/3ML; MG/3ML
3 SOLUTION RESPIRATORY (INHALATION)
Status: DISCONTINUED | OUTPATIENT
Start: 2022-08-03 | End: 2022-08-04 | Stop reason: HOSPADM

## 2022-08-03 RX ORDER — ENOXAPARIN SODIUM 100 MG/ML
40 INJECTION SUBCUTANEOUS DAILY
Status: DISCONTINUED | OUTPATIENT
Start: 2022-08-03 | End: 2022-08-04 | Stop reason: HOSPADM

## 2022-08-03 RX ORDER — FAMOTIDINE 40 MG/1
40 TABLET, FILM COATED ORAL 2 TIMES DAILY
COMMUNITY
End: 2022-08-08

## 2022-08-03 RX ORDER — FAMOTIDINE 20 MG/1
20 TABLET, FILM COATED ORAL DAILY
Status: DISCONTINUED | OUTPATIENT
Start: 2022-08-03 | End: 2022-08-04

## 2022-08-03 RX ORDER — CETIRIZINE HCL 10 MG
10 TABLET ORAL 2 TIMES DAILY
Status: DISCONTINUED | OUTPATIENT
Start: 2022-08-03 | End: 2022-08-03 | Stop reason: DRUGHIGH

## 2022-08-03 RX ORDER — ONDANSETRON 4 MG/1
4 TABLET, ORALLY DISINTEGRATING ORAL
Status: DISCONTINUED | OUTPATIENT
Start: 2022-08-03 | End: 2022-08-04 | Stop reason: HOSPADM

## 2022-08-03 RX ORDER — ACETAMINOPHEN 325 MG/1
325 TABLET ORAL
COMMUNITY

## 2022-08-03 RX ORDER — ACETAMINOPHEN 325 MG/1
650 TABLET ORAL
Status: DISCONTINUED | OUTPATIENT
Start: 2022-08-03 | End: 2022-08-04 | Stop reason: HOSPADM

## 2022-08-03 RX ORDER — ACETAMINOPHEN 650 MG/1
650 SUPPOSITORY RECTAL
Status: DISCONTINUED | OUTPATIENT
Start: 2022-08-03 | End: 2022-08-04 | Stop reason: HOSPADM

## 2022-08-03 RX ORDER — CHOLECALCIFEROL (VITAMIN D3) 125 MCG
5 CAPSULE ORAL
Status: DISCONTINUED | OUTPATIENT
Start: 2022-08-03 | End: 2022-08-03 | Stop reason: RX

## 2022-08-03 RX ORDER — ATORVASTATIN CALCIUM 20 MG/1
20 TABLET, FILM COATED ORAL
Status: DISCONTINUED | OUTPATIENT
Start: 2022-08-03 | End: 2022-08-04 | Stop reason: HOSPADM

## 2022-08-03 RX ADMIN — ATORVASTATIN CALCIUM 20 MG: 20 TABLET, FILM COATED ORAL at 21:38

## 2022-08-03 RX ADMIN — ARFORMOTEROL TARTRATE: 15 SOLUTION RESPIRATORY (INHALATION) at 20:43

## 2022-08-03 RX ADMIN — CETIRIZINE HYDROCHLORIDE 10 MG: 10 TABLET ORAL at 12:49

## 2022-08-03 RX ADMIN — FAMOTIDINE 20 MG: 20 TABLET ORAL at 12:49

## 2022-08-03 RX ADMIN — SODIUM CHLORIDE, PRESERVATIVE FREE 10 ML: 5 INJECTION INTRAVENOUS at 15:06

## 2022-08-03 RX ADMIN — SODIUM CHLORIDE, PRESERVATIVE FREE 10 ML: 5 INJECTION INTRAVENOUS at 21:38

## 2022-08-03 RX ADMIN — LISINOPRIL 10 MG: 10 TABLET ORAL at 12:49

## 2022-08-03 RX ADMIN — ASPIRIN 81 MG CHEWABLE TABLET 81 MG: 81 TABLET CHEWABLE at 10:47

## 2022-08-03 RX ADMIN — SODIUM CHLORIDE, PRESERVATIVE FREE 10 ML: 5 INJECTION INTRAVENOUS at 12:51

## 2022-08-03 NOTE — PROGRESS NOTES
Problem: Self Care Deficits Care Plan (Adult)  Goal: *Acute Goals and Plan of Care (Insert Text)  Description: Occupational Therapy Goals  Initiated 8/3/2022 within 7 day(s). 1.  Patient will perform functional activity seated EOB with supervision/set-up with Good balance. 2.  Patient will perform grooming standing at the sink with Fair+ balance with supervision/set-up for >2 min. 3.  Patient will perform upper body dressing with minimal assistance/contact guard assist.  4.  Patient will perform toilet transfers with supervision/set-up. 5.  Patient will perform all aspects of toileting with supervision/set-up. 6.  Patient will participate in upper extremity therapeutic exercise/activities with supervision/set-up for 8 minutes to improve endurance and UB strength needed for ADLs    7. Patient will utilize energy conservation techniques during functional activities with verbal cues. Prior Level of Function: Pt lives alone, uses cane for functional mobility. Pt's family assist with IADLs, daughter assists daily with dressing and bathing, pt performs toileting, grooming, and self-feeding independently. Outcome: Progressing Towards Goal  OCCUPATIONAL THERAPY EVALUATION    Patient: Edwige Fonseca (89 y.o. male)  Date: 8/3/2022  Primary Diagnosis: NSTEMI (non-ST elevated myocardial infarction) [facility]) [I21.4]       Precautions:   Fall, Skin    ASSESSMENT :  Based on the objective data described below, the patient presents with decreased endurance and functional activity tolerance, generalized weakness, decreased functional mobility, seated, and standing balance, limiting pt's participation and independence with ADLs. Pt's daughter is present during the session and very supportive. Pt is soiled with urine and blood, required Mod-Max A for all ADLs. Per pt's daughter pt usually has someone home with him who can assist him.  Pt required Min A to std and to perform functional mobility at bedside with HHA on L, simulating BR mobility with cane. Pt's RUE with elbow flexion contracture, edema, and intention tremor. Per pt he does not use RUE and is scheduled to have cast placed to assist with graded elbow extension. Pt very motivated to participate in OT and to return to PLOF as he wishes to go home on DC and has good support system. Per pt's daughter family is planning to have Life Alert set up for pt at home and increase supervision. Patient will benefit from skilled intervention to address the above impairments. Patient's rehabilitation potential is considered to be Fair  Factors which may influence rehabilitation potential include:   []             None noted  []             Mental ability/status  [x]             Medical condition  []             Home/family situation and support systems  [x]             Safety awareness  []             Pain tolerance/management  []             Other:      PLAN :  Recommendations and Planned Interventions:   [x]               Self Care Training                  [x]      Therapeutic Activities  [x]               Functional Mobility Training   []      Cognitive Retraining  [x]               Therapeutic Exercises           [x]      Endurance Activities  [x]               Balance Training                    [x]      Neuromuscular Re-Education  []               Visual/Perceptual Training     [x]      Home Safety Training  [x]               Patient Education                   [x]      Family Training/Education  []               Other (comment):    Frequency/Duration: Patient will be followed by occupational therapy 1-2 times per day/4-7 days per week to address goals. Further Equipment Recommendations for Discharge: bedside commode    AMPAC: current research shows that based on an AM-PAC score of 14/24 and their current ADL deficits; it is recommended that the patient have 3-5 sessions per week of Occupational Therapy at d/c to increase the patient's independence.       This AMPAC score should be considered in conjunction with interdisciplinary team recommendations to determine the most appropriate discharge setting. Patient's social support, diagnosis, medical stability, and prior level of function should also be taken into consideration. SUBJECTIVE:   Patient stated I have very good children.     OBJECTIVE DATA SUMMARY:     Past Medical History:   Diagnosis Date    Closed compression fracture of L3 lumbar vertebra 2/5/2018    Chronic aysmptomatic, noted on MRI 2018    Hypertension      Past Surgical History:   Procedure Laterality Date    HX CATARACT REMOVAL Left 09/2004    HX CATARACT REMOVAL Right 11/2004    HX HERNIA REPAIR  11/2001    HX PROSTATECTOMY      HX RETINAL DETACHMENT REPAIR  10/2003     Barriers to Learning/Limitations: None  Compensate with: visual, verbal, tactile, kinesthetic cues/model    Home Situation:   Home Situation  Home Environment: Private residence  # Steps to Enter: 3  Rails to Enter: Yes  One/Two Story Residence: One story  Living Alone: Yes  Support Systems: Child(wood)  Patient Expects to be Discharged to[de-identified] Home  Current DME Used/Available at Home: [address], rolling, [address] chair, Cane, straight  Tub or Shower Type: Shower  []  Right hand dominant   []  Left hand dominant    Cognitive/Behavioral Status:  Neurologic State: Alert  Orientation Level: Oriented to person;Oriented to place;Oriented to situation  Cognition: Follows commands  Safety/Judgement: Awareness of environment; Fall prevention    Skin: mult skin tears, abrasions, bruises, dressing on L elbow and R elbow and forearm  Edema: min RUE    Vision/Perceptual:       WFL  Coordination: BUE  Coordination: Generally decreased, functional (RUE non-functional)  Fine Motor Skills-Upper: Right Impaired;Left Intact    Gross Motor Skills-Upper: Right Impaired;Left Intact    Balance:  Sitting: Impaired  Sitting - Static: Good (unsupported)  Sitting - Dynamic: Fair (occasional)  Standing: Impaired; With support  Standing - Static: Fair  Standing - Dynamic : Fair    Strength: BUE  Strength: Generally decreased, functional (LUE, RUE non-functional)     Tone & Sensation: BUE  Tone: Abnormal  Sensation: Intact   Range of Motion: BUE  AROM: Generally decreased, functional (LUE, RUE with elbow flexion contracture)  PROM: Grossly decreased, non-functional (RUE elbow contracture)   Functional Mobility and Transfers for ADLs:  Bed Mobility:     Supine to Sit: Minimum assistance  Sit to Supine: Minimum assistance     Transfers:  Sit to Stand: Contact guard assistance  Stand to Sit: Contact guard assistance   Toilet Transfer : Minimum assistance (simulated with HHA)      ADL Assessment:   Feeding: Minimum assistance    Oral Facial Hygiene/Grooming: Minimum assistance    Bathing: Moderate assistance    Upper Body Dressing: Moderate assistance    Lower Body Dressing: Maximum assistance    Toileting: Maximum assistance     ADL Intervention:     Cognitive Retraining  Safety/Judgement: Awareness of environment; Fall prevention  Pain:  Pain level pre-treatment: 0/10   Pain level post-treatment: 0/10     Activity Tolerance:   Fair  Please refer to the flowsheet for vital signs taken during this treatment. After treatment:   [] Patient left in no apparent distress sitting up in chair  [x] Patient left in no apparent distress in bed  [x] Call bell left within reach  [x] Nursing notified  [x] Caregiver present  [] Bed alarm activated    COMMUNICATION/EDUCATION:   [x] Role of Occupational Therapy in the acute care setting  [x] Home safety education was provided and the patient/caregiver indicated understanding. [x] Patient/family have participated as able in goal setting and plan of care. [] Patient/family agree to work toward stated goals and plan of care. [] Patient understands intent and goals of therapy, but is neutral about his/her participation. [] Patient is unable to participate in goal setting and plan of care.     Thank you for this referral.  Dayami Holt OTR/L  Time Calculation: 36 mins    Eval Complexity: History: LOW Complexity : Brief history review ; Examination: MEDIUM Complexity : 3-5 performance deficits relating to physical, cognitive , or psychosocial skils that result in activity limitations and / or participation restrictions; Decision Making:LOW Complexity : No comorbidities that affect functional and no verbal or physical assistance needed to complete eval tasks     [address] [place] [id number] AM-PAC® Daily Activity Inpatient Short Form (6-Clicks)*    How much HELP from another person does the patient currently need    (If the patient hasn't done an activity recently, how much help from another person do you think he/she would need if he/she tried?)   Total (Total A or Dep)   A Lot  (Mod to Max A)   A Little (Sup or Min A)   None (Mod I to I)   Putting on and taking off regular lower body clothing? [] 1 [x] 2 [] 3 [] 4   2. Bathing (including washing, rinsing,      drying)? [] 1 [x] 2 [] 3 [] 4   3. Toileting, which includes using toilet, bedpan or urinal?   [] 1 [x] 2 [] 3 [] 4   4. Putting on and taking off regular upper body clothing? [] 1 [x] 2 [] 3 [] 4   5. Taking care of personal grooming such as brushing teeth? [] 1 [] 2 [x] 3 [] 4   6. Eating meals? [] 1 [] 2 [x] 3 [] 4   14/24    Current research shows that based on an AM-PAC score of 14/24 and their current ADL deficits; it is recommended that the patient have 3-5 sessions per week of Occupational Therapy at d/c to increase the patient's independence.

## 2022-08-03 NOTE — PROGRESS NOTES
Reason for Admission:  NSTEMI (non-ST elevated myocardial infarction) ([facility] [address] [I21.4]                 RUR Score:    12%            Plan for utilizing home health:    no                      Likelihood of Readmission:   LOW                         Transition of Care Plan:              Initial assessment completed with patient and daughter Tana Marrufo. Cognitive status of patient: oriented to time, place, person and situation. Face sheet information confirmed:  yes. The patient designates daughter [id number] to participate in his discharge plan and to receive any needed information. This patient lives in a single family home. Patient is able to navigate steps as needed. Prior to hospitalization, patient was considered to be independent with ADLs/IADLS : yes . Patient has a current ACP document on file: no    The patient and son will be available to transport patient home upon discharge. The patient already has [address] [facility] [address],  medical equipment available in the home. Patient is not currently active with home health. Patient has not stayed in a skilled nursing facility or rehab. Was  stay within last 60 days : no. This patient is on dialysis :no    Currently, the discharge plan is Home. The patient states that he can obtain his medications from the pharmacy, and take his medications as directed. Patient's current insurance is [employer]       Care Management Interventions  PCP Verified by CM: Yes  Mode of Transport at Discharge: Other (see comment) (son)  Transition of Care Consult (CM Consult): Discharge Planning  Physical Therapy Consult: Yes  Occupational Therapy Consult: Yes  Support Systems: Child(wood)  Confirm Follow Up Transport: Self  Discharge Location  Patient Expects to be Discharged to[de-identified] Home with family assistance         will continue to monitor and assist with transition of care needs.     JERRY Ren, RN  Care Management

## 2022-08-03 NOTE — PROGRESS NOTES
Patient admitted this morning, seen for follow-up. Patient feeling better, patient states he lost balance and fell in his yard. He did not lose consciousness, no dizziness, no chest pain. Patient currently denies any chest pain or chest tightness. Visit Vitals  /73   Pulse 71   Temp 98.1 °F (36.7 °C)   Resp 18   Ht 5' 9\" (1.753 m)   Wt 81.6 kg (180 lb)   SpO2 97%   BMI 26.58 kg/m²       Exam  General:  Alert, cooperative, no acute distress    Pulmonary:  CTA Bilaterally. No Wheezing/Rales. Cardiovascular: Regular rate and Rhythm. GI:  Soft, Non distended, Non tender. + Bowel sounds. Extremities:  No edema, multiple bruises on the neck, right hand dressing noted with minimal soakage. No calf tenderness. Psych: Good insight. Not anxious or agitated. Neurologic: Alert and oriented X 3. No acute neuro deficits. Labs, chart, and vitals noted    We will trend cardiac enzymes, cardiology input noted  Echo pending, continue telemetry monitoring  Will get urinalysis, monitor CK levels  We will get PT/OT eval and treatment    Discussed with the patient and also with the son at the bedside      Disclaimer: Sections of this note are dictated using utilizing voice recognition software. Minor typographical errors may be present. If questions arise, please do not hesitate to contact me or call our department.

## 2022-08-03 NOTE — PROGRESS NOTES
Spoke with the patients daughter Amy Oh by phone. The patient was suppose to start out patient therapy today at [facility] for his shoulder.  will continue to monitor and assist with transition of care needs.     JERRY Funes, RN  Care Management

## 2022-08-03 NOTE — CONSULTS
Cardiology Initial Patient Referral Note    Cardiology referral request from Dr. Bazzi Sees for evaluation and management/treatment of elevated troponin. Date of  Admission: 8/2/2022  4:18 PM   Primary Care Physician:  Ann Burger DO    Attending Cardiologist: Dr. Lillian Molina:     -Presented s/p fall, unable to get up for 3 hours. Patient states he lost his balance while pulling weeds. Denies prodromal anginal complaints.   -Indeterminate troponin, in setting of above. Patient without any cardiac complaints. No acute ischemic changes on ECG. Patient is without any cardiac complaints.   -Anemia. -HTN, on lisinopril/HCTZ as outpatient. -HLD, on statin. -COPD     No Primary cardiologist, consult by Dr. Leonardo Stable:       I saw, evaluated, interviewed and examined the patient personally. Patient for mechanical fall. Cardiac symptoms prior to fall. No presyncope or syncope. Hemodynamically stable. Exam suggests aortic systolic murmur. Pertinent labs reviewed. Troponin up to 600. EKG with sinus rhythm and no ST changes of ischemia. Echocardiogram with normal EF and no wall motion abnormality. Moderate AS  Elevated troponin is noncardiac in origin in my opinion. Discussed with the patient and the son at the bedside. Conservative medical management is recommended  Continue with medical management. No further cardiac work-up is planned at this time. Significant time spent in reviewing the case, multiple EMR databases, physician notes, reviewing pertinent labs and imaging studies  I spent significant amount of time for medical decision making and updated history, and other providers assessments as well. I personally agree with the findings as stated and the plan as documented.   I saw, examined, and evaluated this patient and performed the substantive portion of the encounter for > 50% of the time including extensive history, physical exam, assessment and plan    Diallo Dhiraj Kaiser MD       -Patient without any cardiac complaints at this time or prior to his mechanical fall. Echo to assess LVEF and valvular pathology pending, further recommendations pending results.   -trend cardiac biomarkers. Will add on CK. Would not start heparin unless biomarkers continue to increase or patient becomes symptomatic.    -Repeat ECG. -Continue ASA and statin. -May benefit from PT evaluation   -Further recommendations pending results. History of Present Illness: This is a 80 y.o. male admitted for NSTEMI (non-ST elevated myocardial infarction) ([facility] [address] [I21.4]. Patient complains of: fall   Roseline Cabrera is a 80 y.o. male, pmhx as stated above, who we are seeing for elevated troponin. Patient reports pulling weeds from his garden yesterday when he lost his balance and topped forward. He was unable to get up for 3 hours until his daughter came and found him on the ground. He states before his hip replacement and shoulder sx in 12/2021, he has been very active. Denies any exertional or resting pain. Patient denies any prior h/o of MI or CHF. Denies any symptoms concerning for unstable angina or decompensated heart failure. Denies CP, SOB, dizziness, near syncope or syncope, lightheadedness, N/V/D, diaphoresis, orthopnea, PND. Cardiac risk factors: dyslipidemia, male gender, hypertension    Review of Symptoms:  Except as stated above include:  Constitutional:  as per HPI   Respiratory:  negative  Cardiovascular:  negative  Gastrointestinal: negative  Genitourinary:  negative  Musculoskeletal:  as per HPI   Neurological:  Negative  Dermatological:  Negative  Endocrinological: Negative  Psychological:  Negative    A comprehensive review of systems was negative except for that written in the HPI.      Past Medical History:     Past Medical History:   Diagnosis Date    Closed compression fracture of L3 lumbar vertebra 2/5/2018    Chronic aysmptomatic, noted on MRI 2018    Hypertension Social History:     Social History     Socioeconomic History    Marital status:    Tobacco Use    Smoking status: Former     Types: Cigarettes     Quit date: 1978     Years since quittin.6    Smokeless tobacco: Never   Substance and Sexual Activity    Alcohol use: Yes     Alcohol/week: 1.7 standard drinks     Types: 2 Standard drinks or equivalent per week    Drug use: No        Family History:   History reviewed. No pertinent family history. Medications:   No Known Allergies     Current Facility-Administered Medications   Medication Dose Route Frequency    sodium chloride (NS) flush 5-40 mL  5-40 mL IntraVENous Q8H    sodium chloride (NS) flush 5-40 mL  5-40 mL IntraVENous PRN    acetaminophen (TYLENOL) tablet 650 mg  650 mg Oral Q6H PRN    Or    acetaminophen (TYLENOL) suppository 650 mg  650 mg Rectal Q6H PRN    polyethylene glycol (MIRALAX) packet 17 g  17 g Oral DAILY PRN    ondansetron (ZOFRAN ODT) tablet 4 mg  4 mg Oral Q8H PRN    Or    ondansetron (ZOFRAN) injection 4 mg  4 mg IntraVENous Q6H PRN    enoxaparin (LOVENOX) injection 40 mg  40 mg SubCUTAneous DAILY    albuterol-ipratropium (DUO-NEB) 2.5 MG-0.5 MG/3 ML  3 mL Nebulization Q6H PRN    nitroglycerin (NITROBID) 2 % ointment 0.5 Inch  0.5 Inch Topical Q6H PRN    melatonin (rapid dissolve) tablet 5 mg  5 mg Oral QHS PRN    aspirin chewable tablet 324 mg  324 mg Oral DAILY     Current Outpatient Medications   Medication Sig    methylPREDNISolone (MEDROL, SILVIA,) 4 mg tablet Take as directed    naproxen sodium (ALEVE) 220 mg cap Take 1 Cap by mouth two (2) times a day. Prn pain    diclofenac (VOLTAREN) 1 % gel Apply  to affected area four (4) times daily. budesonide-formoterol (SYMBICORT) 80-4.5 mcg/actuation HFAA INHALE 2 PUFFS INTO MOUTH ONCE DAILY    gabapentin (NEURONTIN) 100 mg capsule Take 1 tab PO BID PRN for nerve pain    albuterol (PROAIR HFA) 90 mcg/actuation inhaler Take 2 Puffs by inhalation daily.     Cetirizine (ZYRTEC) 10 mg cap Take  by mouth.    lisinopril-hydrochlorothiazide (PRINZIDE, ZESTORETIC) 10-12.5 mg per tablet Take 1 Tab by mouth daily.          Physical Exam:   Visit Vitals  /63   Pulse 100   Temp 98.2 °F (36.8 °C)   Resp 16   Ht 5' 9\" (1.753 m)   Wt 81.6 kg (180 lb)   SpO2 96%   BMI 26.58 kg/m²       TELE: not on tele     BP Readings from Last 3 Encounters:   08/03/22 137/63   01/21/22 (!) 155/68   02/12/20 154/88     Pulse Readings from Last 3 Encounters:   08/02/22 100   01/21/22 82   02/12/20 79     Wt Readings from Last 3 Encounters:   08/02/22 81.6 kg (180 lb)   06/12/20 82.8 kg (182 lb 9.6 oz)   02/12/20 85.3 kg (188 lb)       General:  alert, cooperative, no distress, appears stated age  Neck:  no JVD  Lungs:  clear to auscultation bilaterally  Heart:  RRR, +DONTE murmur heard best at RUSB   Abdomen:  abdomen is soft without significant tenderness, masses, organomegaly or guarding  Extremities:  right arm contracture, no pitting LE edema B/L   Skin: warm, dry, multiple abrasions   Neuro: alert, oriented x3, affect appropriate  Psych: non focal     Data Review:     Recent Labs     08/02/22  1638   WBC 11.3   HGB 11.7*   HCT 35.8*        Recent Labs     08/02/22  1638      K 4.1      CO2 28   *   BUN 23*   CREA 1.19   CA 9.7   MG 2.0   ALB 3.9   ALT 24       Results for orders placed or performed during the hospital encounter of 08/02/22   EKG, 12 LEAD, INITIAL   Result Value Ref Range    Ventricular Rate 88 BPM    Atrial Rate 88 BPM    P-R Interval 166 ms    QRS Duration 122 ms    Q-T Interval 392 ms    QTC Calculation (Bezet) 474 ms    Calculated P Axis 71 degrees    Calculated R Axis -72 degrees    Calculated T Axis 91 degrees    Diagnosis       Normal sinus rhythm  Left anterior fascicular block  Minimal voltage criteria for LVH, may be normal variant ( [place]awk product )  Septal infarct , age undetermined  Lateral infarct , age undetermined  Abnormal ECG  No previous ECGs available         All Cardiac Markers in the last 24 hours:    Lab Results   Component Value Date/Time     08/02/2022 04:38 PM       Last Lipid:  No results found for: CHOL, CHOLX, CHLST, CHOLV, HDL, HDLP, LDL, LDLC, DLDLP, TGLX, TRIGL, TRIGP, CHHD, CHHDX    Cardiographics:     EKG Results       Procedure 720 Value Units Date/Time    EKG, 12 LEAD, INITIAL [[id number]] Collected: 08/02/22 1953    Order Status: Completed Updated: 08/03/22 0644     Ventricular Rate 88 BPM      Atrial Rate 88 BPM      P-R Interval 166 ms      QRS Duration 122 ms      Q-T Interval 392 ms      QTC Calculation (Bezet) 474 ms      Calculated P Axis 71 degrees      Calculated R Axis -72 degrees      Calculated T Axis 91 degrees      Diagnosis --     Normal sinus rhythm  Left anterior fascicular block  Minimal voltage criteria for LVH, may be normal variant ( Kin product )  Septal infarct , age undetermined  Lateral infarct , age undetermined  Abnormal ECG  No previous ECGs available      EKG, 12 LEAD, SUBSEQUENT [[id number]]     Order Status: Sent                     XR Results (most recent):  Results from Hospital Encounter encounter on 08/02/22    XR ELBOW RT 1 V    Narrative  EXAM: XR ELBOW RT 1 V    HISTORY: fall, eval for fx    TECHNIQUE: Single views of the elbow    COMPARISON: 1/21/2022    FINDINGS:  Limited single lateral view slightly oblique. No discrete Displaced fracture  identified. Diffuse osteopenia. Soft tissues are within normal limits. Impression  Limited single lateral view. No displaced fractures identified, however given  the limitations of single lateral view, should symptoms persist consider  cross-sectional imaging.         Signed By: Anuja Velez PA-C     August 3, 2022
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
## 2022-08-03 NOTE — WOUND CARE
Physical Exam  Room ED14: Focused wound assess  Discussed with pt's daughter at bedside. Pt currently being seen by Echo at bedside. Abrasion with scab to bridge of nose, POA. Skin tears right arm from fall at home, thin skin, bruising, POA. Skin tears left arm from fall at home, thin skin, bruising, POA. Wound care orders: Unit nursing staff to apply Neosporin oint (from pharmacy) to bridge of nose, leave open to air, apply Neosporin oint thin layer (from pharmacy) to open wounds on arms, cover with   Xeroform Gauze (Xeroform Petrolatum Dressing from par room), then apply ABD pads, kerlix wraps daily & prn soilage. Will turn over care to nursing staff at this time.   Jason THOMPSONN, RN, [facility], [address]

## 2022-08-04 ENCOUNTER — HOME HEALTH ADMISSION (OUTPATIENT)
Dept: HOME HEALTH SERVICES | Facility: HOME HEALTH | Age: 87
End: 2022-08-04
Payer: MEDICARE

## 2022-08-04 VITALS
OXYGEN SATURATION: 95 % | DIASTOLIC BLOOD PRESSURE: 65 MMHG | SYSTOLIC BLOOD PRESSURE: 135 MMHG | TEMPERATURE: 98.3 F | HEIGHT: 69 IN | WEIGHT: 180 LBS | HEART RATE: 73 BPM | RESPIRATION RATE: 16 BRPM | BODY MASS INDEX: 26.66 KG/M2

## 2022-08-04 LAB
ALBUMIN SERPL-MCNC: 3.1 G/DL (ref 3.4–5)
ALBUMIN/GLOB SERPL: 1 {RATIO} (ref 0.8–1.7)
ALP SERPL-CCNC: 69 U/L (ref 45–117)
ALT SERPL-CCNC: 21 U/L (ref 16–61)
ANION GAP SERPL CALC-SCNC: 4 MMOL/L (ref 3–18)
AST SERPL-CCNC: 40 U/L (ref 10–38)
BASOPHILS # BLD: 0 K/UL (ref 0–0.1)
BASOPHILS NFR BLD: 0 % (ref 0–2)
BILIRUB SERPL-MCNC: 0.6 MG/DL (ref 0.2–1)
BUN SERPL-MCNC: 23 MG/DL (ref 7–18)
BUN/CREAT SERPL: 25 (ref 12–20)
CALCIUM SERPL-MCNC: 8.3 MG/DL (ref 8.5–10.1)
CHLORIDE SERPL-SCNC: 106 MMOL/L (ref 100–111)
CO2 SERPL-SCNC: 30 MMOL/L (ref 21–32)
CREAT SERPL-MCNC: 0.92 MG/DL (ref 0.6–1.3)
DIFFERENTIAL METHOD BLD: ABNORMAL
EOSINOPHIL # BLD: 0.2 K/UL (ref 0–0.4)
EOSINOPHIL NFR BLD: 2 % (ref 0–5)
ERYTHROCYTE [DISTWIDTH] IN BLOOD BY AUTOMATED COUNT: 13.4 % (ref 11.6–14.5)
GLOBULIN SER CALC-MCNC: 3.1 G/DL (ref 2–4)
GLUCOSE SERPL-MCNC: 79 MG/DL (ref 74–99)
HCT VFR BLD AUTO: 31.8 % (ref 36–48)
HGB BLD-MCNC: 10.4 G/DL (ref 13–16)
IMM GRANULOCYTES # BLD AUTO: 0 K/UL (ref 0–0.04)
IMM GRANULOCYTES NFR BLD AUTO: 0 % (ref 0–0.5)
INR PPP: 1.1 (ref 0.8–1.2)
LYMPHOCYTES # BLD: 1 K/UL (ref 0.9–3.6)
LYMPHOCYTES NFR BLD: 13 % (ref 21–52)
MCH RBC QN AUTO: 30.2 PG (ref 24–34)
MCHC RBC AUTO-ENTMCNC: 32.7 G/DL (ref 31–37)
MCV RBC AUTO: 92.4 FL (ref 78–100)
MONOCYTES # BLD: 0.8 K/UL (ref 0.05–1.2)
MONOCYTES NFR BLD: 11 % (ref 3–10)
NEUTS SEG # BLD: 5.6 K/UL (ref 1.8–8)
NEUTS SEG NFR BLD: 74 % (ref 40–73)
NRBC # BLD: 0 K/UL (ref 0–0.01)
NRBC BLD-RTO: 0 PER 100 WBC
PLATELET # BLD AUTO: 150 K/UL (ref 135–420)
PMV BLD AUTO: 11.1 FL (ref 9.2–11.8)
POTASSIUM SERPL-SCNC: 3.9 MMOL/L (ref 3.5–5.5)
PROT SERPL-MCNC: 6.2 G/DL (ref 6.4–8.2)
PROTHROMBIN TIME: 14.6 SEC (ref 11.5–15.2)
RBC # BLD AUTO: 3.44 M/UL (ref 4.35–5.65)
SODIUM SERPL-SCNC: 140 MMOL/L (ref 136–145)
WBC # BLD AUTO: 7.6 K/UL (ref 4.6–13.2)

## 2022-08-04 PROCEDURE — 92610 EVALUATE SWALLOWING FUNCTION: CPT

## 2022-08-04 PROCEDURE — 85610 PROTHROMBIN TIME: CPT

## 2022-08-04 PROCEDURE — 74011000250 HC RX REV CODE- 250: Performed by: INTERNAL MEDICINE

## 2022-08-04 PROCEDURE — 97116 GAIT TRAINING THERAPY: CPT

## 2022-08-04 PROCEDURE — 74011250636 HC RX REV CODE- 250/636: Performed by: INTERNAL MEDICINE

## 2022-08-04 PROCEDURE — 97530 THERAPEUTIC ACTIVITIES: CPT

## 2022-08-04 PROCEDURE — 97602 WOUND(S) CARE NON-SELECTIVE: CPT

## 2022-08-04 PROCEDURE — 2709999900 HC NON-CHARGEABLE SUPPLY

## 2022-08-04 PROCEDURE — G0378 HOSPITAL OBSERVATION PER HR: HCPCS

## 2022-08-04 PROCEDURE — 94640 AIRWAY INHALATION TREATMENT: CPT

## 2022-08-04 PROCEDURE — 99232 SBSQ HOSP IP/OBS MODERATE 35: CPT | Performed by: INTERNAL MEDICINE

## 2022-08-04 PROCEDURE — 36415 COLL VENOUS BLD VENIPUNCTURE: CPT

## 2022-08-04 PROCEDURE — 80053 COMPREHEN METABOLIC PANEL: CPT

## 2022-08-04 PROCEDURE — 85025 COMPLETE CBC W/AUTO DIFF WBC: CPT

## 2022-08-04 PROCEDURE — 99217 PR OBSERVATION CARE DISCHARGE MANAGEMENT: CPT | Performed by: HOSPITALIST

## 2022-08-04 PROCEDURE — 74011250637 HC RX REV CODE- 250/637: Performed by: PHYSICIAN ASSISTANT

## 2022-08-04 PROCEDURE — 74011250637 HC RX REV CODE- 250/637: Performed by: INTERNAL MEDICINE

## 2022-08-04 PROCEDURE — 77010033678 HC OXYGEN DAILY

## 2022-08-04 PROCEDURE — 96372 THER/PROPH/DIAG INJ SC/IM: CPT

## 2022-08-04 PROCEDURE — 94761 N-INVAS EAR/PLS OXIMETRY MLT: CPT

## 2022-08-04 PROCEDURE — 97162 PT EVAL MOD COMPLEX 30 MIN: CPT

## 2022-08-04 RX ORDER — FAMOTIDINE 20 MG/1
20 TABLET, FILM COATED ORAL 2 TIMES DAILY
Status: DISCONTINUED | OUTPATIENT
Start: 2022-08-04 | End: 2022-08-04 | Stop reason: HOSPADM

## 2022-08-04 RX ORDER — GUAIFENESIN 100 MG/5ML
81 LIQUID (ML) ORAL DAILY
Qty: 30 TABLET | Refills: 0 | Status: SHIPPED | OUTPATIENT
Start: 2022-08-05 | End: 2022-09-04

## 2022-08-04 RX ADMIN — ASPIRIN 81 MG CHEWABLE TABLET 81 MG: 81 TABLET CHEWABLE at 08:35

## 2022-08-04 RX ADMIN — SODIUM CHLORIDE, PRESERVATIVE FREE 10 ML: 5 INJECTION INTRAVENOUS at 08:40

## 2022-08-04 RX ADMIN — LISINOPRIL 10 MG: 10 TABLET ORAL at 08:35

## 2022-08-04 RX ADMIN — ARFORMOTEROL TARTRATE: 15 SOLUTION RESPIRATORY (INHALATION) at 08:03

## 2022-08-04 RX ADMIN — ENOXAPARIN SODIUM 40 MG: 100 INJECTION SUBCUTANEOUS at 08:38

## 2022-08-04 RX ADMIN — FAMOTIDINE 20 MG: 20 TABLET ORAL at 08:36

## 2022-08-04 RX ADMIN — CETIRIZINE HYDROCHLORIDE 10 MG: 10 TABLET ORAL at 08:35

## 2022-08-04 NOTE — PROGRESS NOTES
Discharge order noted for today. Pt has been accepted to [facility] agency. Met with patient and son at bedside, they are agreeable to the transition plan today. Transport has been arranged through son. Patient's discharge summary and home health  orders have been forwarded to [address] via [employer]. Updated bedside RN,   to the transition plan.   Discharge information has been documented on the AVS.       JERRY Shetty, RN  Care Management

## 2022-08-04 NOTE — PROGRESS NOTES
Problem: Dysphagia (Adult)  Goal: *Acute Goals and Plan of Care (Insert Text)  Description:   Patient will:  1. Tolerate PO trials with 0 s/s overt distress in 4/5 trials  2. Utilize compensatory swallow strategies/maneuvers (decrease bite/sip, size/rate, alt. liq/sol) with min cues in 4/5 trials    Rec:     Reg solid with thin liquids  Aspiration precautions  HOB >45 during po intake, remain >30 for 30-45 minutes after po   Small bites/sips; alternate liquid/solid with slow feeding rate   Oral care TID  Meds per pt preference  Outcome: Progressing Towards Goal   SPEECH LANGUAGE PATHOLOGY BEDSIDE SWALLOW EVALUATION    Patient: Morgan Byrne (52 y.o. male)  Date: 8/4/2022  Primary Diagnosis: NSTEMI (non-ST elevated myocardial infarction) ([facility] [address] [I21.4]       Precautions: aspiration  Fall, Skin  PLOF: As per H&P    ASSESSMENT :  Based on the objective data described below, the patient presents with [facility]ropharyngeal swallow f[facility]n [facility]. Strength, ROM, and coordination of the orofacial musculature were all found to be [facility]. Pt tolerated reg solid, puree, and thin liquids +/- straw consecutive swallows without any overt s/sx of aspiration. Mastication was appropriate with timely a-p transit. Positive oral clearance observed across all trials. Laryngeal elevation was functional/timely to palpation with all PO trials. Speech production was fluent; no dysarthria observed. Expressive/receptive speech production appeared WFL to informal observation. Pt communicated in sentences of appropriate form, content, and use. Social conversation appropriate. Pt safe for initiation of reg solid diet with thin liquids, aspiration precautions, oral care TID, and meds as tolerated. ST will continue to follow x 1-2 visits to ensure safety of diet tolerance. Patient will benefit from skilled intervention to address the above impairments.   Patient's rehabilitation potential is considered to be Good  Factors which may influence rehabilitation potential include:   [x]            None noted  []            Mental ability/status  []            Medical condition  []            Home/family situation and support systems  []            Safety awareness  []            Pain tolerance/management  []            Other:      PLAN :  Recommendations and Planned Interventions:  See above  Frequency/Duration: Patient will be followed by speech-language pathology x 1-2 visits to address goals. SUBJECTIVE:   Patient stated [place].     OBJECTIVE:     Past Medical History:   Diagnosis Date    Closed compression fracture of L3 lumbar vertebra 2/5/2018    Chronic aysmptomatic, noted on MRI 2018    Hypertension      Past Surgical History:   Procedure Laterality Date    HX CATARACT REMOVAL Left 09/2004    HX CATARACT REMOVAL Right 11/2004    HX HERNIA REPAIR  11/2001    HX PROSTATECTOMY      HX RETINAL DETACHMENT REPAIR  10/2003     Prior Level of Function/Home Situation: see below  Home Situation  Home Environment: Private residence  # Steps to Enter: 3  Rails to Enter: Yes  One/Two Story Residence: One story  Living Alone: Yes  Support Systems: Child(wood)  Patient Expects to be Discharged to[de-identified] Home with home health  Current DME Used/Available at Home: Massiel Harris, rolling, Shower chair, Cane, straight  Tub or Shower Type: Shower    Diet prior to admission: reg solid with thin  Current Diet:  reg solid with thin     Cognitive and Communication Status:  Neurologic State: Alert  Orientation Level: Oriented X4  Cognition: Follows commands  Perception: Appears intact  Perseveration: No perseveration noted  Safety/Judgement: Awareness of environment, Fall prevention  Oral Assessment:  Oral Assessment  Labial: No impairment  Dentition: Natural;Intact  Oral Hygiene: Adequate  Lingual: No impairment  Velum: No impairment  Mandible: No impairment  P.O.  Trials:  Patient Position: 90 at side of bed  Vocal quality prior to P.O.: No impairment  Consistency Presented: Thin liquid; Solid  How Presented: Self-fed/presented;Cup/sip;Straw;Successive swallows  Bolus Acceptance: No impairment  Bolus Formation/Control: No impairment  Propulsion: No impairment  Oral Residue: None  Initiation of Swallow: No impairment  Laryngeal Elevation: Functional  Aspiration Signs/Symptoms: None  Pharyngeal Phase Characteristics: No impairment, issues, or problems   Effective Modifications: None  Cues for Modifications: None  Oral Phase Severity: No impairment  Pharyngeal Phase Severity : No impairment    PAIN:  Start of Eval: 0  End of Eval: 0     After treatment:   []            Patient left in no apparent distress sitting up in chair  [x]            Patient left in no apparent distress in bed  [x]            Call bell left within reach  [x]            Nursing notified  []            Family present  []            Caregiver present  []            Bed alarm activated    COMMUNICATION/EDUCATION:   [x]            Aspiration precautions; swallow safety; compensatory techniques. [x]            Patient/family have participated as able in goal setting and plan of care. []            Patient/family agree to work toward stated goals and plan of care. []            Patient understands intent and goals of therapy; neutral about participation. []            Patient unable to participate in goal setting/plan of care; educ ongoing with interdisciplinary staff  [x]         Posted safety precautions in patient's room.     Thank you for this referral.    Sherice Reynolds M.S., CCC-SLP/L  Speech-Language Pathologist

## 2022-08-04 NOTE — HOME CARE
Received home health referral for [facility] for (SN, PT, OT). Spoke with patient's son Destinee Oliver via phone;  patient identifiers verified. Explained home care services and routines. Demographics verified including insurance, phone and address confirmed. Patient has the following DME: has none at this time. Caregivers available family available to assist.  Orders noted and arranged to be processed to central intake.       ---  Elpidio Sam LPN  [facility] - INPATIENT Liaison

## 2022-08-04 NOTE — PROGRESS NOTES
Problem: Mobility Impaired (Adult and Pediatric)  Goal: *Acute Goals and Plan of Care (Insert Text)  Outcome: Resolved/Met     PHYSICAL THERAPY EVALUATION AND DISCHARGE    Patient: Benjamín Beyer (13 y.o. male)  Date: 8/4/2022  Primary Diagnosis: NSTEMI (non-ST elevated myocardial infarction) ([facility] [I21.4]  Elevated troponin [R77.8]       Precautions:   Fall, Skin  WBAT  PLOF: pt was mod ind PTA, lives alone in a 1 SH however has family check on him daily, hx of R elbow contracture, use of cane as needed     ASSESSMENT :  Based on the objective data described below, the patient presents with baseline functional mobility level. Pt with hx of R elbow contracture and now wrapped 2/2 skin tears from falls. Pt seen with son present for support. Pt is initially mildly unsteady, however progresses to SBA/sup for safety, tolerates approx 100 ft in hallway with no AD, no LOB or gait deficits noted besides decreased speed. Pt and son report pt does not have any acute gait or mobility deficits and are comfortable with pt going home. Will sign off with no discharge needs, recommend increase family support and implementation of life alert system, pt and family agreeable. Left in room with son and all needs met. Patient does not require further skilled intervention at this level of care. PLAN :  Recommendations and Planned Interventions:   No formal PT needs identified at this time. Further Equipment Recommendations for Discharge: N/A    AMPAC:     This AMPAC score should be considered in conjunction with interdisciplinary team recommendations to determine the most appropriate discharge setting. Patient's social support, diagnosis, medical stability, and prior level of function should also be taken into consideration. SUBJECTIVE:   Patient stated I want to go home.     OBJECTIVE DATA SUMMARY:     Past Medical History:   Diagnosis Date    Closed compression fracture of L3 lumbar vertebra 2/5/2018    Chronic aysmptomatic, noted on MRI 2018    Hypertension      Past Surgical History:   Procedure Laterality Date    HX CATARACT REMOVAL Left 09/2004    HX CATARACT REMOVAL Right 11/2004    HX HERNIA REPAIR  11/2001    HX PROSTATECTOMY      HX RETINAL DETACHMENT REPAIR  10/2003     Barriers to Learning/Limitations: yes;  physical  Compensate with: Visual Cues, Verbal Cues, and Tactile Cues  Home Situation:   Home Situation  Home Environment: Private residence  # Steps to Enter: 3  Rails to Enter: Yes  One/Two Story Residence: One story  Living Alone: Yes  Support Systems: Child(wood)  Patient Expects to be Discharged to[de-identified] Home with family assistance  Current DME Used/Available at Home: [address] [facility], [place], straight, Walker, rolling  Tub or Shower Type: Shower  Critical Behavior:  Neurologic State: Alert  Orientation Level: Oriented X4  Cognition: Follows commands  Safety/Judgement: Fall prevention  Psychosocial  Patient Behaviors: Calm; Cooperative                 Strength:    Strength: Within functional limits                    Tone & Sensation:   Tone: Normal              Sensation: Intact               Range Of Motion:  AROM: Within functional limits                       Posture:         Functional Mobility:  Bed Mobility:     Supine to Sit: Stand-by assistance; Additional time     Scooting: Supervision  Transfers:  Sit to Stand: Stand-by assistance  Stand to Sit: Stand-by assistance       Balance:   Sitting: Intact  Sitting - Static: Good (unsupported)  Sitting - Dynamic: Good (unsupported)  Standing: Intact; With support  Standing - Static: Good  Standing - Dynamic : Good       Ambulation/Gait Training:  Distance (ft): 100 Feet (ft)  Assistive Device: Other (comment) (none)  Ambulation - Level of Assistance: Stand-by assistance        Gait Abnormalities: Decreased step clearance        Base of Support: Widened     Speed/Vera: Pace decreased (<100 feet/min)  Step Length: Left shortened          Pain:  Pain level pre-treatment: 0/10 Pain level post-treatment: 0/10  Pain Intervention(s): Medication (see MAR); Rest, Ice, Repositioning   Response to intervention: Nurse notified    Activity Tolerance:   Good    Please refer to the flowsheet for vital signs taken during this treatment. After treatment:   []         Patient left in no apparent distress sitting up in chair  [x]         Patient left in no apparent distress in bed  [x]         Call bell left within reach  [x]         Nursing notified  [x]         Caregiver present- son  []         Bed alarm activated  []         SCDs applied    COMMUNICATION/EDUCATION:   [x]         Role of Physical Therapy in the acute care setting. [x]         Fall prevention education was provided and the patient/caregiver indicated understanding. [x]         Patient/family have participated as able in goal setting and plan of care. [x]         Patient/family agree to work toward stated goals and plan of care. []         Patient understands intent and goals of therapy, but is neutral about his/her participation. []         Patient is unable to participate in goal setting/plan of care: ongoing with therapy staff.  []         Other:     Thank you for this referral.  Joana Wheeler   Time Calculation: 23 mins      Eval Complexity: History: MEDIUM  Complexity : 1-2 comorbidities / personal factors will impact the outcome/ POC Exam:MEDIUM Complexity : 3 Standardized tests and measures addressing body structure, function, activity limitation and / or participation in recreation  Presentation: MEDIUM Complexity : Evolving with changing characteristics  Clinical Decision Making:Medium Complexity    Overall Complexity:MEDIUM    [employer] AM-PAC® Basic Mobility Inpatient Short Form (6-Clicks) Version 2    How much HELP from another person does the patient currently need    (If the patient hasn't done an activity recently, how much help from another person do you think he/she would need if he/she tried?)   Total (Total A or Dep)   A Lot  (Mod to Max A)   A Little (Sup or Min A)   None (Mod I to I)   Turning from your back to your side while in a flat bed without using bedrails? [] 1 [] 2 [] 3 [] 4   2. Moving from lying on your back to sitting on the side of a flat bed without using bedrails? [] 1 [] 2 [] 3 [] 4   3. Moving to and from a bed to a chair (including a wheelchair)? [] 1 [] 2 [] 3 [] 4   4. Standing up from a chair using your arms (e.g., wheelchair, or bedside chair)? [] 1 [] 2 [] 3 [] 4   5. Walking in hospital room? [] 1 [] 2 [] 3 [] 4   6. Climbing 3-5 steps with a railing?+   [] 1 [] 2 [] 3 [] 4   +If stair climbing cannot be assessed, skip item #6. Sum responses from items 1-5. Based on an AM-PAC score of **/24 (or **/20 if omitting stairs) and their current functional mobility deficits, it is recommended that the patient have 5-7 sessions per week of Physical Therapy at d/c to increase the patient's independence. Currently, this patient demonstrates the potential endurance, and/or tolerance for 3 hours of therapy each day at d/c. Based on an AM-PAC score of **/24 (**/20 if omitting stairs) and their current functional mobility deficits, it is recommended that the patient have 3-5 sessions per week of Physical Therapy at d/c to increase the patient's independence. Based on an AM-PAC score of **/24 (or **/20 if omitting stairs) and their current functional mobility deficits, it is recommended that the patient have 2-3 sessions per week of Physical Therapy at d/c to increase the patient's independence. At this time and based on an AM-PAC score of **/24 (or **/20 if omitting stairs), no further PT is recommended upon discharge due to (i.e. patient at baseline functional statusetc). Recommend patient returns to prior setting with prior services.

## 2022-08-04 NOTE — PROGRESS NOTES
Problem: Self Care Deficits Care Plan (Adult)  Goal: *Acute Goals and Plan of Care (Insert Text)  Description: Occupational Therapy Goals  Initiated 8/3/2022 within 7 day(s). 1.  Patient will perform functional activity seated EOB with supervision/set-up with Good balance. 2.  Patient will perform grooming standing at the sink with Fair+ balance with supervision/set-up for >2 min. 3.  Patient will perform upper body dressing with minimal assistance/contact guard assist.  4.  Patient will perform toilet transfers with supervision/set-up. 5.  Patient will perform all aspects of toileting with supervision/set-up. 6.  Patient will participate in upper extremity therapeutic exercise/activities with supervision/set-up for 8 minutes to improve endurance and UB strength needed for ADLs    7. Patient will utilize energy conservation techniques during functional activities with verbal cues. Prior Level of Function: Pt lives alone, uses cane for functional mobility. Pt's family assist with IADLs, daughter assists daily with dressing and bathing, pt performs toileting, grooming, and self-feeding independently. Outcome: Progressing Towards Goal   OCCUPATIONAL THERAPY TREATMENT    Patient: Sandip Epstein (89 y.o. male)  Date: 8/4/2022  Diagnosis: NSTEMI (non-ST elevated myocardial infarction) ([facility] [I21.4]  Elevated troponin [R77.8] Elevated troponin      Precautions: Fall, Skin    Chart, occupational therapy assessment, plan of care, and goals were reviewed. ASSESSMENT:  Nursing/RN cleared for pt to participate in OT tx session. Nursing providing wound care to garry KO at bedside reports pt very adamant to go home versus Rehab. Son states that family will provide assist e.g. ADLs and that he recently provided pt hand held assist to toilet w/o difficulty. Instruction provided for DME recommended at time of OT evaluation I.e. bedside commode to grade easier and prevent falls.  Son reports pt sleeps in lift recliner at home. Call bell within reach & pt verbalized understanding and provided return demonstration to utilize for assist e.g. functional transfers in order to prevent falls. Progression toward goals:  []          Improving appropriately and progressing toward goals  [x]          Improving slowly and progressing toward goals  []          Not making progress toward goals and plan of care will be adjusted     PLAN:  Patient continues to benefit from skilled intervention to address the above impairments. Continue treatment per established plan of care. Further Equipment Recommendations for Discharge:  bedside commode, shower chair    AMPAC: Based on an AM-PAC score of 14/24 and their current ADL deficits; it is recommended that the patient have 3-5 sessions per week of Occupational Therapy at d/c to increase the patient's independence. This AMPAC score should be considered in conjunction with interdisciplinary team recommendations to determine the most appropriate discharge setting. Patient's social support, diagnosis, medical stability, and prior level of function should also be taken into consideration. SUBJECTIVE:   Patient stated I want to go home.     OBJECTIVE DATA SUMMARY:   Cognitive/Behavioral Status:  Neurologic State: Alert  Orientation Level: Oriented X4  Cognition: Follows commands  Safety/Judgement: Fall prevention    Cognitive Retraining  Safety/Judgement: Fall prevention    Pain:  Pain level pre-treatment: 0/10   Pain level post-treatment: 0/10    Activity Tolerance:    poor  Please refer to the flowsheet for vital signs taken during this treatment.   After treatment:   []  Patient left in no apparent distress sitting up in chair  [x]  Patient left in no apparent distress in bed  [x]  Call bell left within reach  [x]  Nursing notified  []  Caregiver present  [x]  Bed alarm activated    COMMUNICATION/EDUCATION:   [x] Role of Occupational Therapy in the acute care setting  [x] Home safety education was provided and the patient/caregiver indicated understanding. [x] Patient/family have participated as able in working towards goals and plan of care. [x] Patient/family agree to work toward stated goals and plan of care. [] Patient understands intent and goals of therapy, but is neutral about his/her participation. [] Patient is unable to participate in goal setting and plan of care. Thank you for this referral.  Carlos A Carney  Time Calculation: 13 mins    [employer] AM-PAC® Daily Activity Inpatient Short Form (6-Clicks)*    How much HELP from another person does the patient currently need    (If the patient hasn't done an activity recently, how much help from another person do you think he/she would need if he/she tried?)   Total (Total A or Dep)   A Lot  (Mod to Max A)   A Little (Sup or Min A)   None (Mod I to I)   Putting on and taking off regular lower body clothing? [] 1 [x] 2 [] 3 [] 4   2. Bathing (including washing, rinsing,      drying)? [] 1 [x] 2 [] 3 [] 4   3. Toileting, which includes using toilet, bedpan or urinal?   [] 1 [x] 2 [] 3 [] 4   4. Putting on and taking off regular upper body clothing? [] 1 [x] 2 [] 3 [] 4   5. Taking care of personal grooming such as brushing teeth? [] 1 [] 2 [x] 3 [] 4   6. Eating meals? [] 1 [] 2 [x] 3 [] 4      Based on an AM-PAC score of **/24 and their current ADL deficits; it is recommended that the patient have 5-7 sessions per week of Occupational Therapy at d/c to increase the patient's independence. Currently, this patient demonstrates the potential endurance, and/or tolerance for 3 hours of therapy each day at d/c. Based on an AM-PAC score of **/24 and their current ADL deficits; it is recommended that the patient have 3-5 sessions per week of Occupational Therapy at d/c to increase the patient's independence.       Based on an AM-PAC score of **/24 and their current ADL deficits; it is recommended that the patient have 2-3 sessions per week of Occupational Therapy at d/c to increase the patient's independence. At this time and based on an AM-PAC score of **/24, no further OT is recommended upon discharge due to (i.e. patient at baseline functional statusetc). Recommend patient returns to prior setting with prior services.

## 2022-08-04 NOTE — ED PROVIDER NOTES
EMERGENCY DEPARTMENT HISTORY AND PHYSICAL EXAM      Date: 8/2/2022  Patient Name: Hillary Burgos    History of Presenting Illness     Chief Complaint   Patient presents with    Fall       History (Context): Hillary Burgos is a 80 y.o. male with a past medical history significant for HTN comes into the ED today due to Fall. Patient per EMS was found to have a fall into his garden and had been found laying there for an unknown down time. Patient upon arrival states he has some abrasions to his arms but does not complain of any head trauma or further injuries. Denies LOC or anticoagulation. States no severity for his symptoms. Denies any alleviating or exacerbating factors.        PCP: Sarina Paulson DO    Current Facility-Administered Medications   Medication Dose Route Frequency Provider Last Rate Last Admin    sodium chloride (NS) flush 5-40 mL  5-40 mL IntraVENous Q8H Declan Sifuentes, DO   10 mL at 08/03/22 2138    sodium chloride (NS) flush 5-40 mL  5-40 mL IntraVENous PRN Cari Fortune DO        acetaminophen (TYLENOL) tablet 650 mg  650 mg Oral Q6H PRN Cari Fortune DO        Or    acetaminophen (TYLENOL) suppository 650 mg  650 mg Rectal Q6H PRN Declan Sifuentes,         polyethylene glycol (MIRALAX) packet 17 g  17 g Oral DAILY PRN Declan Sifuentes,         ondansetron (ZOFRAN ODT) tablet 4 mg  4 mg Oral Q8H PRN Declan Sifuentes,         Or    ondansetron (ZOFRAN) injection 4 mg  4 mg IntraVENous Q6H PRN Declan Sifuentes,         enoxaparin (LOVENOX) injection 40 mg  40 mg SubCUTAneous DAILY Declan Sifuentes,         albuterol-ipratropium (DUO-NEB) 2.5 MG-0.5 MG/3 ML  3 mL Nebulization Q6H PRN Declan Sifuentes,         nitroglycerin (NITROBID) 2 % ointment 0.5 Inch  0.5 Inch Topical Q6H PRN Declan Sifuentes,         melatonin (rapid dissolve) tablet 5 mg  5 mg Oral QHS PRN Cari Fortune DO        aspirin chewable tablet 81 mg  81 mg Oral DAILY Lourdes MANZANARES PA-C   81 mg at 08/03/22 1047 atorvastatin (LIPITOR) tablet 20 mg  20 mg Oral QHS Sasha MANZANARES PA-C   20 mg at 22    arformoterol 15 mcg/budesonide 0.25 mg neb solution   Nebulization BID RT Gordon Crete, DO   Given at 22    lisinopriL (PRINIVIL, ZESTRIL) tablet 10 mg  10 mg Oral DAILY Gordon Haywood, DO   10 mg at 22 124    famotidine (PEPCID) tablet 20 mg  20 mg Oral DAILY Declan Sifuentes, DO   20 mg at 22 1249    cetirizine (ZYRTEC) tablet 10 mg  10 mg Oral DAILY Declan Sifuentes, DO   10 mg at 22 1249    [START ON 2022] neomycin-bacitracnZn-polymyxnB (NEOSPORIN) ointment 1 Packet  1 Packet Topical BID Zakia Colmenares MD           Past History     Past Medical History:   Past Medical History:   Diagnosis Date    Closed compression fracture of L3 lumbar vertebra 2018    Chronic aysmptomatic, noted on MRI 2018    Hypertension        Past Surgical History:  Past Surgical History:   Procedure Laterality Date    HX CATARACT REMOVAL Left 2004    HX CATARACT REMOVAL Right 2004    HX HERNIA REPAIR  2001    HX PROSTATECTOMY      HX RETINAL DETACHMENT REPAIR  10/2003       Family History:  History reviewed. No pertinent family history. Social History:   Social History     Tobacco Use    Smoking status: Former     Types: Cigarettes     Quit date: 1978     Years since quittin.6    Smokeless tobacco: Never   Substance Use Topics    Alcohol use: Yes     Alcohol/week: 1.7 standard drinks     Types: 2 Standard drinks or equivalent per week    Drug use: No       Allergies:  No Known Allergies    PMH, PSH, family history, social history, allergies reviewed with the patient with significant items noted above. Review of Systems   Review of Systems   Constitutional:  Negative for chills and fever. HENT:  Negative for sore throat. Eyes:  Negative for visual disturbance. Negative recent vision problems   Respiratory:  Negative for shortness of breath. Cardiovascular:  Negative for chest pain. Gastrointestinal:  Negative for abdominal pain, diarrhea and nausea. Genitourinary:  Negative for difficulty urinating. Musculoskeletal:  Negative for myalgias. Skin:  Positive for wound. Negative for rash. Neurological:  Negative for headaches. Negative altered level of consciousness   All other systems reviewed and are negative. Physical Exam     Vitals:    08/03/22 1012 08/03/22 1159 08/03/22 1537 08/03/22 2043   BP: 137/63 139/73 131/65    Pulse: 85 71 69    Resp: 22 18 18    Temp: 97.4 °F (36.3 °C) 98.1 °F (36.7 °C) 98 °F (36.7 °C)    SpO2: 95% 97% 96% 97%   Weight: 81.6 kg (180 lb)      Height: 5' 9\" (1.753 m)          Physical Exam  Vitals and nursing note reviewed. Constitutional:       General: He is not in acute distress. Appearance: Normal appearance. He is not ill-appearing or toxic-appearing. HENT:      Head: Normocephalic. Comments: Abrasions to the right face     Mouth/Throat:      Mouth: Mucous membranes are moist.   Eyes:      General: No scleral icterus. Conjunctiva/sclera: Conjunctivae normal.   Cardiovascular:      Rate and Rhythm: Normal rate and regular rhythm. Pulmonary:      Effort: Pulmonary effort is normal. No respiratory distress. Abdominal:      General: There is no distension. Palpations: Abdomen is soft. Tenderness: There is no abdominal tenderness. There is no guarding or rebound. Musculoskeletal:         General: No deformity. Normal range of motion. Cervical back: Normal range of motion and neck supple. Skin:     General: Skin is warm and dry. Findings: No rash. Comments: Multiple skin tears to the b/l upper extremities   Neurological:      General: No focal deficit present. Mental Status: He is alert and oriented to person, place, and time. Mental status is at baseline.    Psychiatric:         Mood and Affect: Mood normal.         Behavior: Behavior normal. Diagnostic Study Results     Labs -     Recent Results (from the past 12 hour(s))   ECHO ADULT COMPLETE    Collection Time: 08/03/22 11:11 AM   Result Value Ref Range    Fractional Shortening 2D 45 28 - 44 %    LVIDd 4.0 (A) 4.2 - 5.9 cm    LVIDd Index 2.02 cm/m2    LVIDs 2.2 cm    LVIDs Index 1.11 cm/m2    IVSd 1.1 (A) 0.6 - 1.0 cm    LVPWd 1.1 (A) 0.6 - 1.0 cm    LV RWT Ratio 0.55     LV Mass 2D 145.6 88 - 224 g    LV Mass 2D Index 73.6 49 - 115 g/m2    MV E Velocity 0.46 m/s    MV A Velocity 0.79 m/s    MV E/A 0.58     E/E' Ratio (Averaged) 6.57     LV E' Lateral Velocity 7 cm/s    E/E' Lateral 6.57     LV E' Septal Velocity 7 cm/s    E/E' Septal 6.57     TAPSE 2.6 1.7 cm    AV Mean Gradient 24 mmHg    AV Peak Gradient 42 mmHg    AV Peak Velocity 3.2 m/s    AV Area by VTI 0.7 cm2    MEG/BSA VTI 0.4 cm2/m2    Aortic Root 3.6 cm    Ao Root Index 1.82 cm/m2   EKG, 12 LEAD, SUBSEQUENT    Collection Time: 08/03/22  1:40 PM   Result Value Ref Range    Ventricular Rate 68 BPM    Atrial Rate 68 BPM    P-R Interval 166 ms    QRS Duration 100 ms    Q-T Interval 432 ms    QTC Calculation (Bezet) 459 ms    Calculated P Axis 100 degrees    Calculated R Axis -57 degrees    Calculated T Axis 36 degrees    Diagnosis       Normal sinus rhythm  Left anterior fascicular block  Possible Lateral infarct (cited on or before 03-AUG-2022)  Abnormal ECG  When compared with ECG of 02-AUG-2022 19:55,  QRS duration has decreased  Criteria for Septal infarct are no longer present  T wave amplitude has decreased in Inferior leads     CBC WITH AUTOMATED DIFF    Collection Time: 08/03/22  2:36 PM   Result Value Ref Range    WBC 7.3 4.6 - 13.2 K/uL    RBC 3.66 (L) 4.35 - 5.65 M/uL    HGB 11.0 (L) 13.0 - 16.0 g/dL    HCT 34.2 (L) 36.0 - 48.0 %    MCV 93.4 78.0 - 100.0 FL    MCH 30.1 24.0 - 34.0 PG    MCHC 32.2 31.0 - 37.0 g/dL    RDW 13.5 11.6 - 14.5 %    PLATELET 994 964 - 158 K/uL    MPV 10.1 9.2 - 11.8 FL    NRBC 0.0 0  WBC ABSOLUTE NRBC 0.00 0.00 - 0.01 K/uL    NEUTROPHILS 74 (H) 40 - 73 %    LYMPHOCYTES 14 (L) 21 - 52 %    MONOCYTES 11 (H) 3 - 10 %    EOSINOPHILS 1 0 - 5 %    BASOPHILS 1 0 - 2 %    IMMATURE GRANULOCYTES 0 0.0 - 0.5 %    ABS. NEUTROPHILS 5.4 1.8 - 8.0 K/UL    ABS. LYMPHOCYTES 1.0 0.9 - 3.6 K/UL    ABS. MONOCYTES 0.8 0.05 - 1.2 K/UL    ABS. EOSINOPHILS 0.1 0.0 - 0.4 K/UL    ABS. BASOPHILS 0.0 0.0 - 0.1 K/UL    ABS. IMM. GRANS. 0.0 0.00 - 0.04 K/UL    DF AUTOMATED     METABOLIC PANEL, COMPREHENSIVE    Collection Time: 08/03/22  2:36 PM   Result Value Ref Range    Sodium 142 136 - 145 mmol/L    Potassium 4.2 3.5 - 5.5 mmol/L    Chloride 106 100 - 111 mmol/L    CO2 31 21 - 32 mmol/L    Anion gap 5 3.0 - 18 mmol/L    Glucose 119 (H) 74 - 99 mg/dL    BUN 23 (H) 7.0 - 18 MG/DL    Creatinine 1.01 0.6 - 1.3 MG/DL    BUN/Creatinine ratio 23 (H) 12 - 20      GFR est AA >60 >60 ml/min/1.73m2    GFR est non-AA >60 >60 ml/min/1.73m2    Calcium 8.5 8.5 - 10.1 MG/DL    Bilirubin, total 0.6 0.2 - 1.0 MG/DL    ALT (SGPT) 22 16 - 61 U/L    AST (SGOT) 36 10 - 38 U/L    Alk.  phosphatase 70 45 - 117 U/L    Protein, total 6.2 (L) 6.4 - 8.2 g/dL    Albumin 3.3 (L) 3.4 - 5.0 g/dL    Globulin 2.9 2.0 - 4.0 g/dL    A-G Ratio 1.1 0.8 - 1.7     TROPONIN-HIGH SENSITIVITY    Collection Time: 08/03/22  2:36 PM   Result Value Ref Range    Troponin-High Sensitivity 302 (HH) 0 - 78 ng/L   CK    Collection Time: 08/03/22  2:36 PM   Result Value Ref Range     (H) 39 - 308 U/L   URINALYSIS W/ RFLX MICROSCOPIC    Collection Time: 08/03/22  5:00 PM   Result Value Ref Range    Color YELLOW      Appearance CLEAR      Specific gravity 1.014 1.005 - 1.030      pH (UA) 7.0 5.0 - 8.0      Protein Negative NEG mg/dL    Glucose Negative NEG mg/dL    Ketone Negative NEG mg/dL    Bilirubin Negative NEG      Blood Negative NEG      Urobilinogen 0.2 0.2 - 1.0 EU/dL    Nitrites Negative NEG      Leukocyte Esterase Negative NEG        Labs Reviewed   CBC WITH AUTOMATED DIFF - Abnormal; Notable for the following components:       Result Value    RBC 3.90 (*)     HGB 11.7 (*)     HCT 35.8 (*)     NEUTROPHILS 89 (*)     LYMPHOCYTES 4 (*)     ABS. NEUTROPHILS 10.0 (*)     ABS. LYMPHOCYTES 0.5 (*)     All other components within normal limits   METABOLIC PANEL, COMPREHENSIVE - Abnormal; Notable for the following components:    Glucose 115 (*)     BUN 23 (*)     GFR est non-AA 57 (*)     All other components within normal limits   TROPONIN-HIGH SENSITIVITY - Abnormal; Notable for the following components:    Troponin-High Sensitivity 343 (*)     All other components within normal limits   TROPONIN-HIGH SENSITIVITY - Abnormal; Notable for the following components:    Troponin-High Sensitivity 606 (*)     All other components within normal limits   CBC WITH AUTOMATED DIFF - Abnormal; Notable for the following components:    RBC 3.66 (*)     HGB 11.0 (*)     HCT 34.2 (*)     NEUTROPHILS 74 (*)     LYMPHOCYTES 14 (*)     MONOCYTES 11 (*)     All other components within normal limits   METABOLIC PANEL, COMPREHENSIVE - Abnormal; Notable for the following components:    Glucose 119 (*)     BUN 23 (*)     BUN/Creatinine ratio 23 (*)     Protein, total 6.2 (*)     Albumin 3.3 (*)     All other components within normal limits   TROPONIN-HIGH SENSITIVITY - Abnormal; Notable for the following components:    Troponin-High Sensitivity 302 (*)     All other components within normal limits   CK - Abnormal; Notable for the following components:     (*)     All other components within normal limits   MAGNESIUM   CK   URINALYSIS W/ RFLX MICROSCOPIC       Radiologic Studies -   XR WRIST RT AP/LAT   Final Result   1. Examination somewhat limited by patient positioning and osteopenia. 2.  No displaced fracture is identified. If clinical concern persists for   fracture consider cross-sectional imaging.          XR SHOULDER RT AP/LAT MIN 2 V   Final Result   Postsurgical changes from right shoulder arthroplasty. No evidence of hardware   failure. No displaced fractures identified. XR ELBOW RT 1 V   Final Result   Limited single lateral view. No displaced fractures identified, however given   the limitations of single lateral view, should symptoms persist consider   cross-sectional imaging. CT HEAD WO CONT   Final Result      No acute intracranial findings. Stable atrophy and chronic microvascular disease. CT SPINE CERV WO CONT   Final Result      1. No CT signs of cervical spine trauma. Stable exam.   2. Decreased bone mineralization. 3. Multilevel facet hypertrophy, with degenerative disc disease greatest at   C6-C7. 4. Stable lucent bone lesion in C2 with a nonaggressive appearance. CT Results  (Last 48 hours)                 08/02/22 1716  CT HEAD WO CONT Final result    Impression:      No acute intracranial findings. Stable atrophy and chronic microvascular disease. Narrative:  CT OF THE HEAD WITHOUT CONTRAST. CLINICAL HISTORY: Fall with head trauma. TECHNIQUE: Helical scan obtained of the head were obtained from the skull vertex   through the base of the skull without intravenous contrast.    All CT scans are   performed using dose optimization techniques as appropriate to the performed   exam including the following: Automated exposure control, adjustment of mA   and/or kV according to patient size, and use of iterative reconstructive   technique. COMPARISON: 1/21/2022 head CT. FINDINGS:        Mild motion artifact. Redemonstration of cortical atrophy and periventricular   white matter hypodensities. Mild ventricular enlargement as appropriate to the   degree of atrophy. No intracranial hemorrhage. No mass effect. The visualized   paranasal sinuses are clear. The mastoid air cells are clear. The visualized   bony structures are unremarkable. Prior left ocular surgery.            08/02/22 1716  CT SPINE CERV WO CONT Final result    Impression:      1. No CT signs of cervical spine trauma. Stable exam.   2. Decreased bone mineralization. 3. Multilevel facet hypertrophy, with degenerative disc disease greatest at   C6-C7. 4. Stable lucent bone lesion in C2 with a nonaggressive appearance. Narrative:  CT SCAN CERVICAL SPINE WITHOUT CONTRAST       HISTORY: Fall. Head trauma. COMPARISON: Cervical spine CT 1/21/2022. TECHNIQUE: Axial images through the cervical spine were obtained without   intravenous contrast. Coronal and sagittal reformatted images were also obtained   for better evaluation of regional anatomy and alignment. All CT scans are   performed using dose optimization techniques as appropriate to the performed   exam including the following: Automated exposure control, adjustment of mA   and/or kV according to patient size, and use of iterative reconstructive   technique. FINDINGS:       Decreased bone mineralization. Odontoid intact. Occipital condylar/C-1/C-2   relationships are intact, with degenerative anterior C1-C2. Stable generalized   mild flattening of most cervical and upper thoracic vertebra without acute   compression deformity. Nonaggressive appearing lucent bone lesion in the right   body of C2 is stable. Multilevel bilateral facet joint hypertrophy. Degenerative   disc space narrowing is moderate at C6-C7 without change. No acute fracture. No   prevertebral soft tissue swelling. CXR Results  (Last 48 hours)      None            The laboratory results, imaging results, and other diagnostic exams were reviewed in the EMR. Medical Decision Making   I am the first provider for this patient. I reviewed the vital signs, available nursing notes, past medical history, past surgical history, family history and social history. Vital Signs-Reviewed the patient's vital signs.        Records Reviewed: Personally, on initial evaluation    MDM:   Florecita Moss presents with complaint of fall  DDX includes but is not limited to: Fall, closed fracture, skin tear    Patient overall well-appearing, no acute distress, vital signs grossly within normal limits. Will provide wound care while here in the emergency department. Will obtain lab work and imaging for further evaluation of patients complaint. Will continue to monitor and evaluate patient while in the ED. Orders as below:  Orders Placed This Encounter    MECHANICAL PROPHYLAXIS IS CONTRAINDICATED Other, please document Already on Anticoagulation    CT HEAD WO CONT    CT SPINE CERV WO CONT    XR SHOULDER RT AP/LAT MIN 2 V    XR WRIST RT AP/LAT    XR ELBOW RT 1 V    CBC WITH AUTOMATED DIFF    COMPREHENSIVE METABOLIC PANEL    TROPONIN-HIGH SENSITIVITY    MAGNESIUM    CK    TROPONIN-HIGH SENSITIVITY    METABOLIC PANEL, COMPREHENSIVE    CBC WITH AUTOMATED DIFF    PROTHROMBIN TIME + INR    CBC WITH AUTOMATED DIFF    METABOLIC PANEL, COMPREHENSIVE    TROPONIN-HIGH SENSITIVITY    URINALYSIS W/ RFLX MICROSCOPIC    CK    ADULT DIET Regular; 3 carb choices (45 gm/meal); Low Fat/Low Chol/High Fiber/VAUGHN; Low Sodium (2 gm); 2000 ml    CARDIAC MONITORING    VITAL SIGNS    NOTIFY PROVIDER: SPECIFY Notify physician for pulse less than 50 or greater than 120, respiratory rate less than 12 or greater than 25, oral temperature greater than 101.3 F (53.0 C), systolic BP less than 90 or greater than 479, diastolic BP less. .. ACTIVITY AS TOLERATED W/ASSIST    WEIGH PATIENT    INTAKE AND OUTPUT    ELEVATE HEAD OF BED    NURSING-MISCELLANEOUS: Palpate, scan or straight cath and document bladder residual as needed CONTINUOUS    TURN & POSITION    DRESSING, CHANGE SPECIFY see comments: Wound care orders: Unit nursing staff to apply Neosporin oint (from pharmacy) to bridge of nose, leave open to air, apply Neosporin oint thin layer (from pharmacy) to open wounds on arms, cover with Xeroform . ..     Turn or assist with turn approximately every 2 hours if patient is unable to turn self. Remind patient to turn if necessary. Assess skin per unit guidelines    NURSING-MISCELLANEOUS: Maintain HOB at the lowest elevation consistent with medical plan of care CONTINUOUS    Maintain heels off of bed at all times    FULL CODE    OT--EVAL, DEVISE PLAN OF CARE AND TREAT    PT--EVAL, DEVISE PLAN OF CARE AND TREAT    SLP--SWALLOWING EVALUATION    EKG, 12 LEAD, INITIAL    EKG, 12 LEAD, SUBSEQUENT    HEEL SUSPENSION BOOT    DISCONTD: aspirin chewable tablet 324 mg    aspirin chewable tablet 324 mg    DISCONTD: aspirin chewable tablet 324 mg    sodium chloride (NS) flush 5-40 mL    sodium chloride (NS) flush 5-40 mL    OR Linked Order Group     acetaminophen (TYLENOL) tablet 650 mg     acetaminophen (TYLENOL) suppository 650 mg    polyethylene glycol (MIRALAX) packet 17 g    OR Linked Order Group     ondansetron (ZOFRAN ODT) tablet 4 mg     ondansetron (ZOFRAN) injection 4 mg    enoxaparin (LOVENOX) injection 40 mg    DISCONTD: melatonin tablet 5 mg    albuterol-ipratropium (DUO-NEB) 2.5 MG-0.5 MG/3 ML    nitroglycerin (NITROBID) 2 % ointment 0.5 Inch    melatonin (rapid dissolve) tablet 5 mg    aspirin chewable tablet 81 mg    atorvastatin (LIPITOR) tablet 20 mg    acetaminophen (TYLENOL) 325 mg tablet    famotidine (PEPCID) 40 mg tablet    arformoterol 15 mcg/budesonide 0.25 mg neb solution    DISCONTD: cetirizine (ZYRTEC) tablet 10 mg    DISCONTD: famotidine (PEPCID) tablet 40 mg    lisinopriL (PRINIVIL, ZESTRIL) tablet 10 mg    famotidine (PEPCID) tablet 20 mg    DISCONTD: perflutren lipid microspheres (DEFINITY) contrast injection 2 mL    cetirizine (ZYRTEC) tablet 10 mg    neomycin-bacitracnZn-polymyxnB (NEOSPORIN) ointment 1 Packet    IP CONSULT TO CARDIOLOGY    INITIAL PHYSICIAN ORDER: INPATIENT Telemetry; Yes; 3.  Patient receiving treatment that can only be provided in an inpatient setting (further clarification in H&P documentation)    IP CONSULT TO WOUND CARE    IP CONSULT TO MOBILITY SERVICES Helena Finch)        ED Course:   ED Course as of 08/03/22 2212   Tue Aug 02, 2022   1715 Patients labs significant for 343, Hgb 11.7, otherwise labs grossly WNL. Will obtain repeat troponin. Will also hold off on aspirin until head CT obtained. [DV]   1800 Patient CT scan does not show any acute traumatic injuries. We will continue to monitor patient. [DV]   2024 This case with on-call cardiologist Dr. Kalpesh Sprague who states he will see patient in the morning after he is admitted for NSTEMI. [TB]   2024 Still pending reads of right upper extremity. We will plan for admission afterwards for NSTEMI. [TB]      ED Course User Index  [DV] Keyonna Garcia DO  [TB] Dominguez Jones MD           Procedures:  Procedures        Diagnosis and Disposition     CLINICAL IMPRESSION:  1. NSTEMI, initial episode of care ([facility] [address]    2. Fall, initial encounter    3. Skin tear of right elbow without complication, initial encounter    4. Elevated troponin [R77.8 (ICD-10-CM)]    5. 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 Quite often unanticipated grammatical, syntax, homophones, and other interpretive errors are inadvertently transcribed by the computer software. Please disregard these errors. Please excuse any errors that have escaped final proofreading. Hillary WOOD.

## 2022-08-04 NOTE — PROGRESS NOTES
Cardiology Progress Note    Admit Date: 8/2/2022  Attending Cardiologist: Dr. Elisabeth Mayfield   Patient seen and examined independently. Patient requesting discharge from the hospital.  Echocardiogram does demonstrate moderate aortic stenosis. No further recommendations in that regard. Agree with assessment and plan as noted below. We will sign off and be available. He will follow-up with Dr. Santiago Desouza in 3 to 4 weeks. Marquis Karen MANUEL  Assessment:     -Presented s/p fall, unable to get up for 3 hours. Patient states he lost his balance while pulling weeds. Denies prodromal anginal complaints.   -Indeterminate troponin, in setting of above. No acute ischemic changes on ECG. Patient is without any cardiac complaints. Echo this admission with normal LVEF, no WMA, mild LVH   -Moderate AS. AV mean gradient is 24 mmHg. AV area by continuity VTI is 0.7 cm2.  -Anemia. -HTN, on lisinopril/HCTZ as outpatient. -HLD, on statin. -COPD      No Primary cardiologist, consult by Dr. Beach Knife:     -Troponin elevated not c/w ACS. Patient remains w/o any cardiac complaints. Normal LVEF and no WMA on echo this admission.   -Plan for patient to follow up with Dr. Parveen York in 3-4 weeks.   -No further recommendations from a CV standpoint. Will sign off and be available as needed. Subjective:     No new complaints. Wants to go home. Denies CP.      Objective:      Patient Vitals for the past 8 hrs:   Temp Pulse Resp BP SpO2   08/04/22 0803 -- -- -- -- 95 %   08/04/22 0749 98.3 °F (36.8 °C) 73 16 135/65 94 %   08/04/22 0450 98.2 °F (36.8 °C) 80 18 132/67 96 %         Patient Vitals for the past 96 hrs:   Weight   08/03/22 1012 81.6 kg (180 lb)   08/02/22 1621 81.6 kg (180 lb)       TELE: normal sinus rhythm               Current Facility-Administered Medications   Medication Dose Route Frequency Last Admin    famotidine (PEPCID) tablet 20 mg  20 mg Oral BID      sodium chloride (NS) flush 5-40 mL  5-40 mL IntraVENous Q8H 10 mL at 08/04/22 0840    sodium chloride (NS) flush 5-40 mL  5-40 mL IntraVENous PRN      acetaminophen (TYLENOL) tablet 650 mg  650 mg Oral Q6H PRN      Or    acetaminophen (TYLENOL) suppository 650 mg  650 mg Rectal Q6H PRN      polyethylene glycol (MIRALAX) packet 17 g  17 g Oral DAILY PRN      ondansetron (ZOFRAN ODT) tablet 4 mg  4 mg Oral Q8H PRN      Or    ondansetron (ZOFRAN) injection 4 mg  4 mg IntraVENous Q6H PRN      enoxaparin (LOVENOX) injection 40 mg  40 mg SubCUTAneous DAILY 40 mg at 08/04/22 0838    albuterol-ipratropium (DUO-NEB) 2.5 MG-0.5 MG/3 ML  3 mL Nebulization Q6H PRN      nitroglycerin (NITROBID) 2 % ointment 0.5 Inch  0.5 Inch Topical Q6H PRN      melatonin (rapid dissolve) tablet 5 mg  5 mg Oral QHS PRN      aspirin chewable tablet 81 mg  81 mg Oral DAILY 81 mg at 08/04/22 0835    atorvastatin (LIPITOR) tablet 20 mg  20 mg Oral QHS 20 mg at 08/03/22 2138    arformoterol 15 mcg/budesonide 0.25 mg neb solution   Nebulization BID RT Given at 08/04/22 0803    lisinopriL (PRINIVIL, ZESTRIL) tablet 10 mg  10 mg Oral DAILY 10 mg at 08/04/22 0835    cetirizine (ZYRTEC) tablet 10 mg  10 mg Oral DAILY 10 mg at 08/04/22 1967    neomycin-bacitracnZn-polymyxnB (NEOSPORIN) ointment 1 Packet  1 Packet Topical BID           Intake/Output Summary (Last 24 hours) at 8/4/2022 1202  Last data filed at 8/3/2022 1701  Gross per 24 hour   Intake 640 ml   Output --   Net 640 ml       Physical Exam:  General:  alert, cooperative, no distress, appears stated age  Neck:  no JVD  Lungs:  clear to auscultation bilaterally  Heart:  regular rate and rhythm, +early peaking DONTE  Abdomen:  abdomen is soft without significant tenderness, masses, organomegaly or guarding  Extremities:  extremities normal, atraumatic, no cyanosis or edema, multiple abrasions and wounds    Visit Vitals  /65 (BP 1 Location: Left upper arm)   Pulse 73   Temp 98.3 °F (36.8 °C)   Resp 16   Ht 5' 9\" (1.753 m)   Wt 81.6 kg (180 lb) SpO2 95%   BMI 26.58 kg/m²       Data Review:     Labs: Results:       Chemistry Recent Labs     08/04/22  0146 08/03/22  1436 08/02/22  1638   GLU 79 119* 115*    142 142   K 3.9 4.2 4.1    106 104   CO2 30 31 28   BUN 23* 23* 23*   CREA 0.92 1.01 1.19   CA 8.3* 8.5 9.7   MG  --   --  2.0   AGAP 4 5 10   BUCR 25* 23* 19   AP 69 70 93   TP 6.2* 6.2* 7.2   ALB 3.1* 3.3* 3.9   GLOB 3.1 2.9 3.3   AGRAT 1.0 1.1 1.2      CBC w/Diff Recent Labs     08/04/22 0146 08/03/22 1436 08/02/22  1638   WBC 7.6 7.3 11.3   RBC 3.44* 3.66* 3.90*   HGB 10.4* 11.0* 11.7*   HCT 31.8* 34.2* 35.8*    152 173   GRANS 74* 74* 89*   LYMPH 13* 14* 4*   EOS 2 1 0      Cardiac Enzymes Lab Results   Component Value Date/Time     (H) 08/03/2022 02:36 PM      Coagulation Recent Labs     08/04/22 0146   PTP 14.6   INR 1.1       Lipid Panel No results found for: CHOL, CHOLPOCT, CHOLX, CHLST, CHOLV, 684549, HDL, HDLP, LDL, LDLC, DLDLP, 157859, VLDLC, VLDL, TGLX, TRIGL, TRIGP, TGLPOCT, CHHD, CHHDX   BNP No results found for: BNP, BNPP, XBNPT   Liver Enzymes Recent Labs     08/04/22  0146   TP 6.2*   ALB 3.1*   AP 69      Thyroid Studies No results found for: T4, T3U, TSH, TSHEXT       Signed By: Narendra Moulton PA-C     August 4, 2022

## 2022-08-04 NOTE — DISCHARGE INSTRUCTIONS
Discharge Instructions    Patient: Hillary Burgos MRN: [id number]  CSN: [id number]    YOB: 1930  Age: 80 y.o. Sex: male    DOA: 8/2/2022 LOS: [unfilled]  Discharge Date: [unfilled]     DIET:  Regular Diet    ACTIVITY: Activity as tolerated  Home health care for Skilled care for wound care and medication management       PT/OT consult   Wound care consult      ADDITIONAL INFORMATION: If you experience any of the following symptoms but not limited to Fever, chills, nausea, vomiting, diarrhea, change in mentation, falling, bleeding, shortness of breath, chest pain, please call your primary care physician or return to the emergency room if you cannot get hold of your doctor:     FOLLOW UP CARE:  Dr. Lizbet Corrigan in 5-7 days. Please call and set up an appointment.       Liz Yates MD  8/4/2022 11:47 AM

## 2022-08-04 NOTE — ROUTINE PROCESS
0237- Bedside, Verbal and Written shift change report received by Mago Baron (oncoming nurse) by Jayshree(offgoing nurse). Report included the following information SBAR, Kardex, Intake/Output, MAR and Recent Results. 0755-Assessment completed, call bell within reach, no distress noted. 0835-AM  medications administered, pt tolerated with ease, will continue to monitor. 1330- Shift reassessment, pt condition unchanged, will continue to monitor. Dressing change completed. 1430-reinforced dressing with ABD and ace wrap. 1445-informed Dr. Jesus Lazar of continuous bleeding to wounds. Ok to discharge. Discharged instructions given. -As part of the discharge instructions, medications already given today were discussed with the patient. The next dose due of all ordered meds was highlighted as part of the medication discharge instructions. Discussed with the patient the importance of taking medications as directed, as well as the side effects and adverse reactions to medications ordered. Also instructed if wounds continue to bleed to seek medical attention. Verbalized understanding. Discharged home via wheel chair with son.

## 2022-08-04 NOTE — PROGRESS NOTES
RENAL DOSING DAILY    Patient clinical status and labs ordered/reviewed. Pt Weight Weight: 81.6 kg (180 lb)   Serum Creatinine Lab Results   Component Value Date/Time    Creatinine 0.92 08/04/2022 01:46 AM       Creatinine Clearance Estimated Creatinine Clearance: 52.3 mL/min (based on SCr of 0.92 mg/dL). BUN Lab Results   Component Value Date/Time    BUN 23 (H) 08/04/2022 01:46 AM       WBC Lab Results   Component Value Date/Time    WBC 7.6 08/04/2022 01:46 AM      Temperature Temp: 98.3 °F (36.8 °C)   HR Pulse (Heart Rate): 73     BP BP: 135/65           MAR reviewed for dose administration times. Current Dose is inadequate as renal function has and improved . Dose will be changed to 20 mg BID per CrCl > 50 mL/min    Continue to monitor.     Signed: Sherren Spearman, PHARMD  Date 8/4/2022  Time 9:51 AM

## 2022-08-04 NOTE — DISCHARGE SUMMARY
Discharge Summary    Patient: Georges Scales MRN: [id number]  CSN: [id number]    YOB: 1930  Age: 80 y.o. Sex: male    DOA: 8/2/2022 LOS:  LOS: 1 day        Disposition: Home with [facility]    Discharge Date: 8/4/2022    Admission Diagnosis: NSTEMI (non-ST elevated [facility]) [facility] [I21.4]  Elevated troponin [R77.8]    Discharge Diagnosis:    Fall at home  Intermittent troponin  Moderate AS  Hypertension  Mild cognitive impairment  Advanced age  COPD      Discharge Condition: Stable      PHYSICAL EXAM  Visit Vitals  /65 (BP 1 Location: Left upper arm)   Pulse 73   Temp 98.3 °F (36.8 °C)   Resp 16   Ht 5' 9\" (1.753 m)   Wt 81.6 kg (180 lb)   SpO2 95%   BMI 26.58 kg/m²       General: Alert, cooperative, no acute distress    HEENT: PERRLA, EOMI. Anicteric sclerae. Lungs:  CTA Bilaterally. No Wheezing/Rales. Heart:             Regular rate and Rhythm. Abdomen: Soft, Non distended, Non tender. + Bowel sounds. Extremities: No edema. Psych:   Good insight. Not anxious or agitated. Neurologic:  AA, oriented X 3. Moves all ext                                 Hospital Course:     Georges Scales is a 80 y.o. male who presents to [facility] ER with complaint of Fall. Patient reports that he was cleaning up rocks on his driveway and on his flower bed when he reached for a stone, lost balance, and fell at approximately 1500 EST on 8/02/2022. Patient reports he has been outside for 3 hours and did not lose consciousness, feel faint, or otherwise have palpitations. Patient denies shortness of breath and chest pain. In [facility] ER, Patient is noted to have an elevated Troponin of 343 ng/L and 606 ng/L. Radiographs have been read by Radiology as having no fractures and CT Head and CT Cervical Spine were read as no acute processes. Patient was admitted to hospital with concerning for elevated troponin, cardiology was consulted. Cardiac enzymes started trending down.   Patient did not have any chest pain, no symptoms. Patient did have some abrasion and skin tear on his right upper extremity, wound care was consulted. Wound care to the dressing and recommended further testing. Cardiology saw the patient, thought that elevated troponin not consistent with ACS recommended echo. Echo was within normal limits. Cardiology recommended no further intervention but okay for discharge from the standpoint. Patient hospital was seen and eval by PT/OT recommended SNF placement. Discussed with the patient and also with his son at the bedside about need for SNF placement, patient declined SNF placement. Patient states he would like to go home with help from his family. Son was at the bedside states that he will have 24-hour care for him at home and they will eventually transition to assisted living when patient agreeable. Have discussed with the patient and son that he needs maximum assistance at home, and also would need somebody at home given his mild cognitive impairment. Son states he will be arranging caregivers along with himself and his family will take care of the patient. Procedures: None     Consults: Dr. Laura Briggs, cardiology    Imaging studies:   08/02/22    ECHO ADULT COMPLETE 08/03/2022 8/3/2022    Interpretation Summary  Formatting of this result is different from the original.      Technical qualifiers: Echo study was technically difficult due to patient's body habitus and technically difficult due to low parasternal window. Left Ventricle: Normal left ventricular systolic function with a visually estimated EF of 55 - 60%. Left ventricle is smaller than normal. Increased wall thickness. Findings consistent with mild concentric hypertrophy. Normal wall motion. Right Ventricle: Not well visualized. Aortic Valve: Moderate stenosis of the aortic valve. AV mean gradient is 24 mmHg. AV area by continuity VTI is 0.7 cm2. Aorta: Dilated aortic root. Ao Root diameter is 3.6 cm.     Signed by: Ashley Brady MD on 8/3/2022  3:53 PM         Discharge Medications:     Current Discharge Medication List        START taking these medications    Details   aspirin 81 mg chewable tablet Take 1 Tablet by mouth in the morning for 30 days. Qty: 30 Tablet, Refills: 0      neomycin-bacitracnZn-polymyxnB (NEOSPORIN) 3.5-400-5,000 mg-unit-unit oipk ointment Apply 1 Packet to affected area two (2) times a day. Qty: 30 Packet, Refills: 0           CONTINUE these medications which have NOT CHANGED    Details   famotidine (PEPCID) 40 mg tablet Take 40 mg by mouth two (2) times a day. budesonide-formoterol (SYMBICORT) 80-4.5 mcg/actuation HFAA INHALE 2 PUFFS INTO MOUTH ONCE DAILY      Cetirizine (ZYRTEC) 10 mg cap Take  by mouth. acetaminophen (TYLENOL) 325 mg tablet Take 325 mg by mouth every four (4) hours as needed for Pain. albuterol (PROVENTIL HFA, VENTOLIN HFA, PROAIR HFA) 90 mcg/actuation inhaler Take 2 Puffs by inhalation daily. lisinopril-hydrochlorothiazide (PRINZIDE, ZESTORETIC) 10-12.5 mg per tablet Take 1 Tab by mouth daily. It is important that you take the medication exactly as they are prescribed. Keep your medication in the bottles provided by the pharmacist and keep a list of the medication names, dosages, and times to be taken in your wallet. Do not take other medications without consulting your doctor. Wound care orders: Unit nursing staff to apply Neosporin oint (from pharmacy) to bridge of nose, leave open to air, apply Neosporin oint thin layer (from pharmacy) to open wounds on arms, cover with  Xeroform Gauze (Xeroform Petrolatum Dressing from [facility] room), then apply ABD pads, kerlix wraps daily & prn soilage.     Minutes spent on discharge: 40 minutes spent coordinating this discharge (review instructions/follow-up, prescriptions, preparing report for sign off)    Jose Banegas MD  8/4/2022 11:50 AM    Disclaimer: Sections of this note are dictated using utilizing voice recognition software. Minor typographical errors may be present. If questions arise, please do not hesitate to contact me or call our department.

## 2022-08-04 NOTE — PROGRESS NOTES
Freedom of choice signed for [facility]. Referral placed in queue. Left message on [facility] liaison . Patient and family declined [facility]. Son at bedside said the patient has a rolling walker if needed. Family, three children, will provide 24/7 care and will have a discussion as to next level since this was the patients second fall.  will continue to monitor and assist with transition of care needs.     JAY LeslieN, RN  Care Management

## 2022-08-07 ENCOUNTER — HOME CARE VISIT (OUTPATIENT)
Dept: SCHEDULING | Facility: HOME HEALTH | Age: 87
End: 2022-08-07
Payer: MEDICARE

## 2022-08-07 PROCEDURE — 3331090001 HH PPS REVENUE CREDIT

## 2022-08-07 PROCEDURE — 400013 HH SOC

## 2022-08-07 PROCEDURE — G0299 HHS/HOSPICE OF RN EA 15 MIN: HCPCS

## 2022-08-07 PROCEDURE — 3331090002 HH PPS REVENUE DEBIT

## 2022-08-07 PROCEDURE — 400018 HH-NO PAY CLAIM PROCEDURE

## 2022-08-07 NOTE — Clinical Note
thanks for the update  ----- Message -----  From: Andria Caldwell RN  Sent: 8/8/2022   2:26 PM EDT  To: Adan Valdes PT      SOC completed on 8/7/22. SN/PT/OT all ordered and accepted by patient and caregiver. Patient with 4 wounds on arms due to a fall. Wounds are skin tears, no signs of infection and wound care as ordered. Apply Neosporin oint (from pharmacy) to bridge of nose, leave open to air, apply Neosporin oint thin layer (from pharmacy) to open wounds on arms, cover with    Xeroform Gauze (Xeroform Petrolatum Dressing from par room), then apply ABD pads, kerlix wraps daily & prn soilage. Provide education to patient and family of dressing changes. Monitor signs and symptoms of in fection.  Patient is ambulating without DME,

## 2022-08-08 ENCOUNTER — HOME CARE VISIT (OUTPATIENT)
Dept: SCHEDULING | Facility: HOME HEALTH | Age: 87
End: 2022-08-08
Payer: MEDICARE

## 2022-08-08 VITALS
RESPIRATION RATE: 16 BRPM | HEART RATE: 68 BPM | TEMPERATURE: 97.8 F | DIASTOLIC BLOOD PRESSURE: 60 MMHG | SYSTOLIC BLOOD PRESSURE: 118 MMHG | OXYGEN SATURATION: 97 %

## 2022-08-08 PROCEDURE — G0299 HHS/HOSPICE OF RN EA 15 MIN: HCPCS

## 2022-08-08 PROCEDURE — 3331090001 HH PPS REVENUE CREDIT

## 2022-08-08 PROCEDURE — 3331090002 HH PPS REVENUE DEBIT

## 2022-08-08 NOTE — HOME HEALTH
Skilled services/Home bound verification: Patient and caregiver, understand homebound status. Skilled Reason for admission/summary of clinical condition:  Moderate Aortic Stenosis/Fall with multiple skin tears. This patient is homebound for the following reasons Requires considerable and taxing effort to leave the home , Requires the assistance of 1 or more persons to leave the home  and Only leaves the home for medical reasons or Denominational services and are infrequent and of short duration for other reasons . Caregiver: is daughter, Frances Resides, she  Assists with ADLs, Medication management, Transportation to appointments, Meal prep and assists with ambulation and will be doing dressings. Medications reconciled and all medications are available in the home this visit. The following education was provided regarding medications: Take all medications as prescribed and around the same time each day  Medications  are in the home  at this time. Home health supplies by type and quantity ordered/delivered this visit include: Kerlix/xeroform gauze/DWC/tape    Patient education provided this visit to include: Patient is a fall risk, went over the need of having someone with him when ambulating, keep hallways and living areas free of clutter and throw rugs. Went over the importance of keeping dressing dry clean and intact. Went over the signs of infection and when to call the Doctor. I went over the importance of  a high protein diet to promote healing. We discussed the importance of frequent ambulation to prevent DVTs and increase strength and flexibility. MEDICATION ADHERENCE IS AN IMPORTANT COMPONENT OF HTN MANAGEMENT. PATIENT STATES THE IMPORTANCE TO TAKE HTN MEDS SAME TIME EACH DAY. Continue a heart Healthy diet. Watch out for high sodium foods, read labels. Observe for signs of infection. Monitor B/P, take meds as ordered and f/u with PCP.   Discussed the importance of staying well hydrated with water 6-8 glasses a day. Patient level of understanding of education provided: Daughter watched as I did dressing, I showed her aseptic technique and the importance of checking for signs of infection. Patient response to procedure performed:  Taking dressing off especially the right arm was difficult. (Dressing had been on since Thursday) Places it had bled through were stuck to wounds with dried blood. .  So this was painful and difficult, Patient was cooperative. Home exercise program/Homework provided:I showed daughter Darrel Tomlinson how to do dressings, she will have more opportunities over next few days to learn dressing. Discussed s/s of infection to monitor for, s/s of UTI, who to report to/when. Instructed cg to notify staff/md/seek tx if complications occur. Patient instructed to maintain clear pathways in home and to minimize clutter to prevent falls from occurring/minimize fall potential.   Patient needing a well balanced diet with the 5 food groups, patient to increase fiber in diet, fresh fruits and vegetables, whole grains and increase water intake. Maintain healthy low sodium diet, Continue to monitor B/P and observe for signs of infection. Work with PT to increase strength and f/u with PCP. I went over the importance of taking all prescriptions as ordered. I discussed how to avoid extra sodium in his diet. We discussed the signs of infection and when to call MD.  We discussed the high risk for falls and ways to prevent falls in the future. We discussed taking B/P daily and keeping a log. We also discussed the need of a heart healthy diet, and the need to change positions frequently. Gaining strength with PT for increased mobility. Pt/Caregiver instructed on plan of care and are agreeable to plan of care at this time.       Physician Divya Reed  notified of patient admission to home health and plan of care including anticipated frequency of visits  and treatments/interventions/modalities of SN/PT/OT. Discharge planning discussed with patient and caregiver. Discharge planning as follows: Will discharge patient when medically stable and education is completed. Will discharge from nursing when dressing is no longer needed OR CAN BE MANAGED BY CAREGIVER. ..  Pt/Caregiver did verbalize understanding of discharge planning. Next MD appointment 8/11/22 with Trina Lawrence MD.  Patient/caregiver encouraged/instructed to keep appointment as lack of follow through with physician appointment could result in discontinuation of home care services for non-compliance.

## 2022-08-08 NOTE — CASE COMMUNICATION
SOC completed on 8/7/22. SN/PT/OT all ordered and accepted by patient and caregiver. Patient with 4 wounds on arms due to a fall. Wounds are skin tears, no signs of infection and wound care as ordered. Apply Neosporin oint (from pharmacy) to bridge of nose, leave open to air, apply Neosporin oint thin layer (from pharmacy) to open wounds on arms, cover with    Xeroform Gauze (Xeroform Petrolatum Dressing from par room), then apply  ABD pads, kerlix wraps daily & prn soilage. Provide education to patient and family of dressing changes. Monitor signs and symptoms of in fection.  Patient is ambulating without DME,

## 2022-08-09 ENCOUNTER — HOME CARE VISIT (OUTPATIENT)
Dept: SCHEDULING | Facility: HOME HEALTH | Age: 87
End: 2022-08-09
Payer: MEDICARE

## 2022-08-09 VITALS
HEART RATE: 81 BPM | TEMPERATURE: 97.1 F | RESPIRATION RATE: 18 BRPM | DIASTOLIC BLOOD PRESSURE: 78 MMHG | OXYGEN SATURATION: 98 % | SYSTOLIC BLOOD PRESSURE: 108 MMHG

## 2022-08-09 VITALS
RESPIRATION RATE: 18 BRPM | TEMPERATURE: 97.7 F | SYSTOLIC BLOOD PRESSURE: 124 MMHG | OXYGEN SATURATION: 98 % | HEART RATE: 76 BPM | DIASTOLIC BLOOD PRESSURE: 76 MMHG

## 2022-08-09 PROCEDURE — 3331090002 HH PPS REVENUE DEBIT

## 2022-08-09 PROCEDURE — 3331090001 HH PPS REVENUE CREDIT

## 2022-08-09 PROCEDURE — G0300 HHS/HOSPICE OF LPN EA 15 MIN: HCPCS

## 2022-08-09 NOTE — HOME HEALTH
Skilled Reason for visit: medication and disease management, wound care    LEFT ARM ALMOST COMPLETELY HEALED. MUCH IMPROVED PER CAREGIVER. RIGHT ARM WITH MORE EXTENSIVE TRAUMA PROGRESSING TOWARDS HEALING. WC REVEIWED AND PT'/CAREGIVER DEMONSTRATE ABILITY TO MANAGE WELL/TROUBLESHOOT IF NEEDED. Caregiver: Family member, is available as needed or assistance with IADL's, ADL's, meal prep, medication management, and taking patient to all doctor's appointments. Medications reconciled and all medications are available in the home this visit. The following education was provided regarding medications, medication interactions, and look alike medications (specify): Pt to take daily medications on timely basis following proper dosage and freq. Patient/cg instructed to continue to take medications as prescribed. patient aware to monitor for effectiveness and to notify staff of any adverse reactions to medications/any changes to medication regimen. Medications are effective at this time. High risk medication teaching regarding anticoagulants, hyperglycemic agents or opiod narcotics performed. no discrepancies/medication interactions noted      Sharps education: NA    Home health supplies by type and quantity ordered/delivered this visit include: N/A     Patient education provided this visit to include: See interventions      Patient/caregiver degree of understanding: Patient and caregiver verbalized full understanding. Patient level of understanding of education provided: patient has a good understanding of education at this time        Patient response to procedure performed: Patient tolerated wound care,  assessment, education, review of POC very well     Home exercise program/Homework provided: Pt/Caregiver is to keep a daily log of VS readings. Pt/Caregiver is to keep a daily log of healthy fluid intake to meet hydration needs. Discharge planning discussed with patient and caregiver. Discharge planning as follows: Patient will be discharged once education has completed, patient is medically stable and pt/cg are able to independently manage medications and disease process. Pt/Caregiver verbalized understanding of discharge planning. Pt/Caregiver advised to contact us with any questions or concerns. Pt/Caregiver advised to call 911 in a life-threatening emergency. Pt/Caregiver given the opportunity to ask questions and do not have any further questions or concerns at this time.

## 2022-08-09 NOTE — HOME HEALTH
Skilled reason for visit: skilled assessment, education, medication management, wound care    Caregiver involvement:  Family assists ADL's, medications, meals, transportation, errands. Medications reviewed and all medications are available in the home this visit. The following education was provided regarding medications:  nstructed patient/caregiver on general precautions while taking aspirin: take with food, milk, or large glass of water to decrease gastric symptoms. (Enteric coated or buffered may be better tolerated.); avoid alcohol due to possible internal bleeding; only take the recommended amount; use cautiously with Asthma; observe and report s/s of bleeding (easy bruising, bleeding gums, black stools); discard medications if vinegar odor is present; do not take antacids due to decreased effectiveness; avoid chewing or crushing enteric coated. MD notified of any discrepancies/look a-like medications/medication interactions: na  Medications are effective at this time. Home health supplies by type and quantity ordered/delivered this visit include: na    Patient education provided this visit: Skilled nurse instructed patient on safety measures to avoid injuries and falls such as keeping adequate lighting during the day and night and was educated on proper use of assistive device to prevent falls. Instructed in materials used in wound care. However, even with proper treatment, a wound infection may occur. Check the wound daily for signs of infection like increased drainage or bleeding from the wound that wont stop with direct pressure, redness in or around the wound, foul odor or pus coming from the wound, increased swelling around the wound and ever above 101.0°F or shaking chills. Sharps education provided: na    Patient level of understanding of education provided: patient/caregiver verbalized 100% understanding.       Skilled Care Performed this visit: skilled assessment, education, medication management, wound care    Patient response to procedure performed:  patient denied pain and discomfort during procedure/visit    Agency Progress toward goals: progressing    Patient's Progress towards personal goals: progressing    Home exercise program: Sn instructed patient on pursed lip breathing. Pursed lip breathing is one of the simplest ways to control shortness of breath. It provides a quick and easy way to slow your pace of breathing, making each breath more effective. Pursed lip breathing: Improves ventilation, releases trapped air in the lungs, keeps the airways open longer and decreases the work of breathing, prolongs exhalation to slow the breathing rate, improves breathing patterns by moving old air out of the lungs and allowing for new air to enter the lungs, relieves shortness of breath, causes general relaxation. Practice this technique 4 - 5 times a day at first so you can get the correct breathing pattern. Pursed lip breathing technique: Relax your neck and shoulder muscles, breathe in ( inhale ) slowly through your nose for two counts, keeping your mouth closed. Don't take a deep breath; a normal breath will do. It may help to count to yourself: inhale, one, two. Pucker or purse your lips as if you were going to whistle or gently flicker the flame of a candle. Breathe out ( exhale ) slowly and gently through your pursed lips while counting to four. It may help to count to yourself: exhale, one, two, three, four.     Continued need for the following skills: Nursing, Physical Therapy and Occupational Therapy    Plan for next visit: skilled assessment, education, medication management, wound care    Patient and/or caregiver notified and agrees to changes in the Plan of Care YES/NO/NA: NA    The following discharge planning was discussed with the pt/caregiver:  Discharge planning as follows: when goals met, patient/caregiver able to manage disease process, medications and pain

## 2022-08-10 ENCOUNTER — HOME CARE VISIT (OUTPATIENT)
Dept: HOME HEALTH SERVICES | Facility: HOME HEALTH | Age: 87
End: 2022-08-10
Payer: MEDICARE

## 2022-08-10 ENCOUNTER — HOME CARE VISIT (OUTPATIENT)
Dept: SCHEDULING | Facility: HOME HEALTH | Age: 87
End: 2022-08-10
Payer: MEDICARE

## 2022-08-10 VITALS
TEMPERATURE: 98 F | SYSTOLIC BLOOD PRESSURE: 126 MMHG | DIASTOLIC BLOOD PRESSURE: 76 MMHG | HEART RATE: 77 BPM | OXYGEN SATURATION: 95 % | RESPIRATION RATE: 16 BRPM

## 2022-08-10 PROCEDURE — G0300 HHS/HOSPICE OF LPN EA 15 MIN: HCPCS

## 2022-08-10 PROCEDURE — 3331090002 HH PPS REVENUE DEBIT

## 2022-08-10 PROCEDURE — 3331090001 HH PPS REVENUE CREDIT

## 2022-08-10 PROCEDURE — G0151 HHCP-SERV OF PT,EA 15 MIN: HCPCS

## 2022-08-10 NOTE — HOME HEALTH
PT INITIAL EVALUATION  Gregor Toussaint is a 80 y.o. male who presents to [facility] ER with complaint of Fall. Patient reports that he was cleaning up rocks on his driveway and on his flower bed when he reached for a stone, lost balance, and fell at approximately 1500 EST on 8/02/2022. Patient reports he has been outside for 3 hours and did not lose consciousness, feel faint, or otherwise have palpitations. Patient denies shortness of b reath and chest pain. Patient reports the he was able to attend Physical Therapy at [facility] and was going to have a cast placed to attempt to reduced severity of RUE elbow contractures. Patient also denies wheezing or shortness of breath. Patient denies fevers, chills, nausea, vomiting, diarrhea, dysuria, cough, chest pain, pain with inspiration, abdominal pain, and shortness of breath. 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 Former smoker 8/3/2022   History of prostate cancer 8/3/2022   Environmental and seasonal allergies 8/3/2022   Elevated troponin 8/3/2022   Fall 8/3/2022     Recent H/o current illness:  80 year old male presents with MD referral for HHPT s/p hospitalization due to aortic stenosis, Pt. had a fall in the yard and scratched himself up. Pt. has a right elbow contraction  Medication Management: daughter manages medications  Social hx and home eval:  Pt lives in single story home with family close by.    Caregiver Involvement: assist with medical appointments some ADL's  PLOF:  Pt PLOF is ambulation w no AD, pts base level of function independent with ADL's  BALANCE:     Seated unsupported balance is good   Standing static balance is fair  Standing dynamic balance is fair-  Tinetti 21 /28  Patient is at risk for falls due to recent hospitalization and fall  BLE Strength:  Left Hip flexion 3+/5 , hip abduction 3+/5, hip adduction 3+/5, Knee flexion 3+/5  knee extension 3+/5, ankle dorsiflexion 3+/5  Right Hip flexion 3+/5 , hip abduction 3+/5, hip adduction 3+/5, Knee flexion 3+/5  knee extension 3+/5, ankle dorsiflexion 3+/5  BLE ROM:  Right hip/knee/ankle: WFL  Left hip/knee/ankle: WFL  Bed mobility:  sleeps in recliner   Transfers:  supervision with sit<->stand, with no AD. SBA cues and instruction needed for safety  GAIT:  Patient ambulated  75 ft  with no AD  on level  surfaces SBA for safety. Pt demonstrates with decreased hip and knee flexion on BLE in pre and mid swing phase of gait as well as decreased stride-length and olivia. Pt required SBA cues for  safety and sequencing  Stairs: 2 steps with SBA/CGA and hand rail  Car transfers with supervision  Patient education provided this visit: Safety with functional mobility and instruction with HEP. Patient level of understanding of education provided: good ,able to return demonstrate mobility instruction with no assistance  Patient response to treatment:  good with no c/o increased pain  I  Assessment: Referral for HHPT following recent hospitalization due to aortic stenosis and fall. HHPT is medically not necessary as he is at his PLOF and is planning on going to outpatient therapy next week for his shoulder. Pt is a fall risk as indicated by Tinetti score of 21/28.

## 2022-08-11 ENCOUNTER — HOME CARE VISIT (OUTPATIENT)
Dept: HOME HEALTH SERVICES | Facility: HOME HEALTH | Age: 87
End: 2022-08-11
Payer: MEDICARE

## 2022-08-11 VITALS
DIASTOLIC BLOOD PRESSURE: 65 MMHG | SYSTOLIC BLOOD PRESSURE: 121 MMHG | RESPIRATION RATE: 16 BRPM | TEMPERATURE: 98.9 F | OXYGEN SATURATION: 100 % | HEART RATE: 78 BPM

## 2022-08-11 PROCEDURE — 3331090001 HH PPS REVENUE CREDIT

## 2022-08-11 PROCEDURE — 3331090002 HH PPS REVENUE DEBIT

## 2022-08-11 NOTE — HOME HEALTH
Skilled Reason for visit: medication and disease management, wound care        Caregiver: Family member, is available as needed or assistance with IADL's, ADL's, meal prep, medication management, and taking patient to all doctor's appointments. Medications reconciled and all medications are available in the home this visit. The following education was provided regarding medications, tylenol, use for pain relief, importance of not exceeding 3000mg daily and potenital for nausea. Medications are effective at this time. Sharps education: ZANE         Home health supplies by type and quantity ordered/delivered this visit include: N/A         Patient education provided this visit to include: struct patient/caregiver on importance of adequate nutrition and hydration for wound healing. Healthy foods give your body the nutrients it needs to heal wounds. Protein foods like meat, fish, nuts, and soy products are important to wound healing. In addition to protein, calories, vitamin C, and zinc help wounds heal. Liquids prevent dehydration that can decrease the blood supply to wounds. Always follow physician recommended diet in regards to patient condition. Instruct on other factors that affect wound healing such as: maintaining general hygiene, not smoking or using tobacco products, offloading pressure and pressure relieving devices       Patient level of understanding of education provided: patient has a good understanding of education at this time         Patient response to procedure performed: Patient tolerated wound care,  assessment, education    Home exercise program/Homework provided: Pt/Caregiver is to keep a daily log of VS readings. Pt/Caregiver is to keep a daily log of healthy fluid intake to meet hydration needs. Discharge planning discussed with patient and caregiver.  Discharge planning as follows: Patient will be discharged once education has completed, patient is medically stable and pt/cg are able to independently manage medications and disease process. Pt/Caregiver verbalized understanding of discharge planning.

## 2022-08-12 ENCOUNTER — HOME CARE VISIT (OUTPATIENT)
Dept: HOME HEALTH SERVICES | Facility: HOME HEALTH | Age: 87
End: 2022-08-12
Payer: MEDICARE

## 2022-08-12 ENCOUNTER — HOME CARE VISIT (OUTPATIENT)
Dept: SCHEDULING | Facility: HOME HEALTH | Age: 87
End: 2022-08-12
Payer: MEDICARE

## 2022-08-12 PROCEDURE — 3331090002 HH PPS REVENUE DEBIT

## 2022-08-12 PROCEDURE — G0300 HHS/HOSPICE OF LPN EA 15 MIN: HCPCS

## 2022-08-12 PROCEDURE — 3331090001 HH PPS REVENUE CREDIT

## 2022-08-13 VITALS
SYSTOLIC BLOOD PRESSURE: 124 MMHG | DIASTOLIC BLOOD PRESSURE: 68 MMHG | OXYGEN SATURATION: 96 % | HEART RATE: 84 BPM | RESPIRATION RATE: 18 BRPM | TEMPERATURE: 97.9 F

## 2022-08-13 PROCEDURE — 3331090001 HH PPS REVENUE CREDIT

## 2022-08-13 PROCEDURE — 3331090002 HH PPS REVENUE DEBIT

## 2022-08-13 NOTE — HOME HEALTH
Skilled Reason for visit: medication and disease management, wound care    EXTENSIVE WOUND CARE EDUCATION PROVIDED. Caregiver: Family member, is available as needed or assistance with IADL's, ADL's, meal prep, medication management, and taking patient to all doctor's appointments. Medications reconciled and all medications are available in the home this visit. The following education was provided regarding medications, medication interactions, and look alike medications (specify): Pt to take daily medications on timely basis following proper dosage and freq. Patient/cg instructed to continue to take medications as prescribed. patient aware to monitor for effectiveness and to notify staff of any adverse reactions to medications/any changes to medication regimen. Medications are effective at this time. High risk medication teaching regarding anticoagulants, hyperglycemic agents or opiod narcotics performed. no discrepancies/medication interactions noted      Sharps education:NA    Home health supplies by type and quantity ordered/delivered this visit include: N/A     Patient education provided this visit to include: See interventions      Patient/caregiver degree of understanding: Patient and caregiver verbalized full understanding. Patient level of understanding of education provided: patient has a good understanding of education at this time        Patient response to procedure performed: Patient tolerated wound care,  assessment, education, review of POC very well     Home exercise program/Homework provided: Pt/Caregiver is to keep a daily log of VS readings. Pt/Caregiver is to keep a daily log of healthy fluid intake to meet hydration needs. Discharge planning discussed with patient and caregiver.  Discharge planning as follows: Patient will be discharged once education has completed, patient is medically stable and pt/cg are able to independently manage medications and disease process. Pt/Caregiver verbalized understanding of discharge planning. Pt/Caregiver advised to contact us with any questions or concerns. Pt/Caregiver advised to call 911 in a life-threatening emergency. Pt/Caregiver given the opportunity to ask questions and do not have any further questions or concerns at this time.

## 2022-08-14 PROCEDURE — 3331090002 HH PPS REVENUE DEBIT

## 2022-08-14 PROCEDURE — 3331090001 HH PPS REVENUE CREDIT

## 2022-08-15 ENCOUNTER — HOME CARE VISIT (OUTPATIENT)
Dept: SCHEDULING | Facility: HOME HEALTH | Age: 87
End: 2022-08-15
Payer: MEDICARE

## 2022-08-15 PROCEDURE — 3331090001 HH PPS REVENUE CREDIT

## 2022-08-15 PROCEDURE — 3331090002 HH PPS REVENUE DEBIT

## 2022-08-15 PROCEDURE — G0299 HHS/HOSPICE OF RN EA 15 MIN: HCPCS

## 2022-08-18 VITALS
TEMPERATURE: 97 F | HEART RATE: 74 BPM | DIASTOLIC BLOOD PRESSURE: 80 MMHG | SYSTOLIC BLOOD PRESSURE: 138 MMHG | OXYGEN SATURATION: 96 % | RESPIRATION RATE: 16 BRPM

## 2025-02-25 NOTE — HOME HEALTH
"Continued Stay Note  [facility]     Patient Name: Delonte Hernandes  MRN: [id number]  Today's Date: 2/25/2025    Admit Date: 2/24/2025    Plan: Home with wife's assistance   Discharge Plan       Row Name 02/25/25 3536       Plan    Plan Home with wife's assistance    Patient/Family in Agreement with Plan yes    Plan Comments Met with Mr. Hernandes, at the bedside, for discharge planning.    Mr. Hernandes lives with his wife, in [facility].    He states that prior to admission, he ambulated independently with a st cane used PRN.    He has been evaluated by PT and per notes, \"PT eval completed. Pt presents s/p lumbar fusion w/ deficits in strength, balance, and activity tolerance. Pt ambulated 350 ft w/ FWW, CGA. Pt educated on spinal precautions, log rolling technique, and HEP. Pt would benefit from IP PT services while hospitalized. Recommend home w/ assist at d/c.\"    The patient declined a rolling walker or any additional DME needs.  OT did dispense an ADL kit.    PCP is Felipa Pereira.  Insurance is Medicare.  No ACP documents.    DC plan is to return home with his wife's assistance.  Mr. Hernandes states his wife will be transporting him home when discharged.    CM will continue to follow.    Final Discharge Disposition Code 01 - home or self-care                    Cecelia Dixon RN    " REASON FOR VISIT;  SN DC OASIS. Caregiver: relative. Caregiver assists with COOKING, CLEANING AND TAKES PATIENT TO MD APPT. Medications reconciled and all medications are available in the home this visit. The following education was provided regarding medications, medication interactions, and look alike medications (specify): RECONCILED MEDICATIONS WITH PATIENT AND CAREGIVER. Medications  are effective at this time. High risk medication teaching regarding anticoagulants, hyperglycemic agents or opiod narcotics performed (specify)  notified of any discrepancies/medication interactions NA    Home health supplies by type and quantity ordered/delivered this visit include: NA    Patient education provided this visit  to include: TAUGHT PATIENT S/S OF INFECTION AT WOUND SITES. INSTRUCTED ON MEDICATION AND DIETARY COMPLIANCY. INSTRUCTED ON WHEN TO NOTIFY MD OF MEDICAL CHANGES R/T  RT AND LT ARM WOUNDS. AND TAUGHT ON STENOSIS DISEASE PROCESS. Patient/caregiver degree of understanding:good    Home exercise program/Homework provided: BREATHING EXERCISES TO HELP PREVENT PNEUMONIA. Batsheva Garcia Pt/Caregiver instructed on plan of care and are agreeable to plan of care at this time. Discharge planning discussed with patient and caregiver. Discharge planning as follows: PATIENT WILL BE DISCHARGED ONCE ALL GOALS HAVE BEEN MET AND MEDICALLY STABLE. Pt/Caregiver did verbalize understanding of discharge planning.